# Patient Record
Sex: MALE | Race: WHITE | NOT HISPANIC OR LATINO
[De-identification: names, ages, dates, MRNs, and addresses within clinical notes are randomized per-mention and may not be internally consistent; named-entity substitution may affect disease eponyms.]

---

## 2015-05-26 RX ORDER — WARFARIN SODIUM 2.5 MG/1
1 TABLET ORAL
Qty: 0 | Refills: 0 | COMMUNITY
Start: 2015-05-26

## 2015-05-26 RX ORDER — METOPROLOL TARTRATE 50 MG
0 TABLET ORAL
Qty: 0 | Refills: 0 | COMMUNITY
Start: 2015-05-26

## 2017-01-03 ENCOUNTER — APPOINTMENT (OUTPATIENT)
Dept: CARDIOLOGY | Facility: CLINIC | Age: 82
End: 2017-01-03

## 2017-01-03 VITALS — HEART RATE: 67 BPM | HEIGHT: 75 IN | BODY MASS INDEX: 21.76 KG/M2 | WEIGHT: 175 LBS

## 2017-01-03 LAB — INR PPP: 3.3 RATIO

## 2017-01-10 ENCOUNTER — APPOINTMENT (OUTPATIENT)
Dept: CARDIOLOGY | Facility: CLINIC | Age: 82
End: 2017-01-10

## 2017-01-10 LAB — INR PPP: 3.1 RATIO

## 2017-01-19 ENCOUNTER — APPOINTMENT (OUTPATIENT)
Dept: CARDIOLOGY | Facility: CLINIC | Age: 82
End: 2017-01-19

## 2017-01-19 LAB — INR PPP: 2.1 RATIO

## 2017-02-01 ENCOUNTER — APPOINTMENT (OUTPATIENT)
Dept: CARDIOLOGY | Facility: CLINIC | Age: 82
End: 2017-02-01

## 2017-02-01 LAB — INR PPP: 1.5 RATIO

## 2017-02-08 ENCOUNTER — APPOINTMENT (OUTPATIENT)
Dept: CARDIOLOGY | Facility: CLINIC | Age: 82
End: 2017-02-08

## 2017-02-08 ENCOUNTER — NON-APPOINTMENT (OUTPATIENT)
Age: 82
End: 2017-02-08

## 2017-02-08 VITALS
WEIGHT: 176 LBS | BODY MASS INDEX: 21.88 KG/M2 | DIASTOLIC BLOOD PRESSURE: 81 MMHG | HEART RATE: 75 BPM | HEIGHT: 75 IN | SYSTOLIC BLOOD PRESSURE: 145 MMHG | OXYGEN SATURATION: 99 %

## 2017-02-08 LAB — INR PPP: 1.7 RATIO

## 2017-02-15 ENCOUNTER — RX RENEWAL (OUTPATIENT)
Age: 82
End: 2017-02-15

## 2017-02-15 ENCOUNTER — APPOINTMENT (OUTPATIENT)
Dept: CARDIOLOGY | Facility: CLINIC | Age: 82
End: 2017-02-15

## 2017-02-15 LAB — INR PPP: 2 RATIO

## 2017-03-01 ENCOUNTER — APPOINTMENT (OUTPATIENT)
Dept: CARDIOLOGY | Facility: CLINIC | Age: 82
End: 2017-03-01

## 2017-03-01 LAB — INR PPP: 2.4 RATIO

## 2017-03-22 ENCOUNTER — APPOINTMENT (OUTPATIENT)
Dept: CARDIOLOGY | Facility: CLINIC | Age: 82
End: 2017-03-22

## 2017-03-22 VITALS — BODY MASS INDEX: 21.88 KG/M2 | WEIGHT: 176 LBS | HEART RATE: 75 BPM | HEIGHT: 75 IN

## 2017-03-22 LAB
INR PPP: 2.8 RATIO
QUALITY CONTROL: YES

## 2017-04-03 ENCOUNTER — MEDICATION RENEWAL (OUTPATIENT)
Age: 82
End: 2017-04-03

## 2017-04-19 ENCOUNTER — APPOINTMENT (OUTPATIENT)
Dept: CARDIOLOGY | Facility: CLINIC | Age: 82
End: 2017-04-19

## 2017-04-20 ENCOUNTER — APPOINTMENT (OUTPATIENT)
Dept: CARDIOLOGY | Facility: CLINIC | Age: 82
End: 2017-04-20

## 2017-04-20 VITALS — HEIGHT: 75 IN | WEIGHT: 176 LBS | HEART RATE: 75 BPM | BODY MASS INDEX: 21.88 KG/M2

## 2017-04-20 LAB
INR PPP: 3.1 RATIO
QUALITY CONTROL: YES

## 2017-05-03 ENCOUNTER — RX RENEWAL (OUTPATIENT)
Age: 82
End: 2017-05-03

## 2017-05-18 ENCOUNTER — APPOINTMENT (OUTPATIENT)
Dept: CARDIOLOGY | Facility: CLINIC | Age: 82
End: 2017-05-18

## 2017-05-18 VITALS — BODY MASS INDEX: 21.88 KG/M2 | WEIGHT: 176 LBS | HEIGHT: 75 IN | HEART RATE: 75 BPM

## 2017-05-18 LAB
INR PPP: 4.4 RATIO
QUALITY CONTROL: YES

## 2017-05-24 ENCOUNTER — APPOINTMENT (OUTPATIENT)
Dept: CARDIOLOGY | Facility: CLINIC | Age: 82
End: 2017-05-24

## 2017-05-24 VITALS
WEIGHT: 182 LBS | HEIGHT: 75 IN | BODY MASS INDEX: 22.63 KG/M2 | OXYGEN SATURATION: 99 % | DIASTOLIC BLOOD PRESSURE: 62 MMHG | HEART RATE: 71 BPM | SYSTOLIC BLOOD PRESSURE: 108 MMHG

## 2017-05-25 VITALS — HEART RATE: 71 BPM | HEIGHT: 75 IN | BODY MASS INDEX: 22.63 KG/M2 | WEIGHT: 182 LBS

## 2017-05-25 LAB
INR PPP: 2.9 RATIO
QUALITY CONTROL: YES

## 2017-06-06 ENCOUNTER — APPOINTMENT (OUTPATIENT)
Dept: CARDIOLOGY | Facility: CLINIC | Age: 82
End: 2017-06-06

## 2017-06-06 VITALS — HEIGHT: 75 IN | HEART RATE: 71 BPM | BODY MASS INDEX: 22.63 KG/M2 | WEIGHT: 182 LBS

## 2017-06-06 LAB
INR PPP: 3.6 RATIO
QUALITY CONTROL: YES

## 2017-06-12 ENCOUNTER — RESULT CHARGE (OUTPATIENT)
Age: 82
End: 2017-06-12

## 2017-06-13 ENCOUNTER — APPOINTMENT (OUTPATIENT)
Dept: CARDIOLOGY | Facility: CLINIC | Age: 82
End: 2017-06-13

## 2017-06-13 ENCOUNTER — NON-APPOINTMENT (OUTPATIENT)
Age: 82
End: 2017-06-13

## 2017-06-13 VITALS
HEART RATE: 69 BPM | HEIGHT: 75 IN | DIASTOLIC BLOOD PRESSURE: 59 MMHG | WEIGHT: 168 LBS | OXYGEN SATURATION: 97 % | SYSTOLIC BLOOD PRESSURE: 96 MMHG | BODY MASS INDEX: 20.89 KG/M2

## 2017-06-15 LAB
ANION GAP SERPL CALC-SCNC: 16 MMOL/L
BUN SERPL-MCNC: 30 MG/DL
CALCIUM SERPL-MCNC: 10 MG/DL
CHLORIDE SERPL-SCNC: 103 MMOL/L
CO2 SERPL-SCNC: 26 MMOL/L
CREAT SERPL-MCNC: 1.64 MG/DL
DIGOXIN SERPL-MCNC: 1 NG/ML
GLUCOSE SERPL-MCNC: 91 MG/DL
POTASSIUM SERPL-SCNC: 4.5 MMOL/L
SODIUM SERPL-SCNC: 145 MMOL/L

## 2017-06-20 ENCOUNTER — APPOINTMENT (OUTPATIENT)
Dept: CARDIOLOGY | Facility: CLINIC | Age: 82
End: 2017-06-20

## 2017-06-20 VITALS — BODY MASS INDEX: 20.89 KG/M2 | WEIGHT: 168 LBS | HEART RATE: 69 BPM | HEIGHT: 75 IN

## 2017-06-20 LAB
INR PPP: 2.2 RATIO
QUALITY CONTROL: YES

## 2017-07-11 ENCOUNTER — APPOINTMENT (OUTPATIENT)
Dept: CARDIOLOGY | Facility: CLINIC | Age: 82
End: 2017-07-11

## 2017-07-13 LAB — INR PPP: 2.5 RATIO

## 2017-08-03 ENCOUNTER — TRANSCRIPTION ENCOUNTER (OUTPATIENT)
Age: 82
End: 2017-08-03

## 2017-08-08 ENCOUNTER — APPOINTMENT (OUTPATIENT)
Dept: CARDIOLOGY | Facility: CLINIC | Age: 82
End: 2017-08-08
Payer: MEDICARE

## 2017-08-08 VITALS — HEART RATE: 69 BPM | WEIGHT: 168 LBS | HEIGHT: 75 IN | BODY MASS INDEX: 20.89 KG/M2

## 2017-08-08 LAB
INR PPP: 2.5 RATIO
QUALITY CONTROL: YES

## 2017-08-08 PROCEDURE — 99211 OFF/OP EST MAY X REQ PHY/QHP: CPT

## 2017-08-08 PROCEDURE — 85610 PROTHROMBIN TIME: CPT | Mod: QW

## 2017-08-09 ENCOUNTER — APPOINTMENT (OUTPATIENT)
Dept: CARDIOLOGY | Facility: CLINIC | Age: 82
End: 2017-08-09
Payer: MEDICARE

## 2017-08-09 PROCEDURE — 93283 PRGRMG EVAL IMPLANTABLE DFB: CPT

## 2017-08-15 ENCOUNTER — APPOINTMENT (OUTPATIENT)
Dept: CARDIOLOGY | Facility: CLINIC | Age: 82
End: 2017-08-15

## 2017-09-08 ENCOUNTER — APPOINTMENT (OUTPATIENT)
Dept: CARDIOLOGY | Facility: CLINIC | Age: 82
End: 2017-09-08
Payer: MEDICARE

## 2017-09-08 VITALS — BODY MASS INDEX: 20.89 KG/M2 | HEIGHT: 75 IN | HEART RATE: 69 BPM | WEIGHT: 168 LBS

## 2017-09-08 LAB
INR PPP: 1.5 RATIO
QUALITY CONTROL: YES

## 2017-09-08 PROCEDURE — 85610 PROTHROMBIN TIME: CPT | Mod: QW

## 2017-09-08 PROCEDURE — 99211 OFF/OP EST MAY X REQ PHY/QHP: CPT

## 2017-09-15 ENCOUNTER — APPOINTMENT (OUTPATIENT)
Dept: CARDIOLOGY | Facility: CLINIC | Age: 82
End: 2017-09-15
Payer: MEDICARE

## 2017-09-15 LAB
INR PPP: 2 RATIO
QUALITY CONTROL: YES

## 2017-09-15 PROCEDURE — 99211 OFF/OP EST MAY X REQ PHY/QHP: CPT

## 2017-09-15 PROCEDURE — 85610 PROTHROMBIN TIME: CPT | Mod: QW

## 2017-09-29 ENCOUNTER — APPOINTMENT (OUTPATIENT)
Dept: CARDIOLOGY | Facility: CLINIC | Age: 82
End: 2017-09-29
Payer: MEDICARE

## 2017-09-29 VITALS — HEIGHT: 75 IN | HEART RATE: 69 BPM | WEIGHT: 168 LBS | BODY MASS INDEX: 20.89 KG/M2

## 2017-09-29 PROCEDURE — 99211 OFF/OP EST MAY X REQ PHY/QHP: CPT

## 2017-09-29 PROCEDURE — 85610 PROTHROMBIN TIME: CPT | Mod: QW

## 2017-10-02 LAB
INR PPP: 2.5 RATIO
QUALITY CONTROL: YES

## 2017-10-20 ENCOUNTER — APPOINTMENT (OUTPATIENT)
Dept: CARDIOLOGY | Facility: CLINIC | Age: 82
End: 2017-10-20
Payer: MEDICARE

## 2017-10-20 LAB
INR PPP: 2.3 RATIO
QUALITY CONTROL: YES

## 2017-10-20 PROCEDURE — 85610 PROTHROMBIN TIME: CPT | Mod: QW

## 2017-10-20 PROCEDURE — 99211 OFF/OP EST MAY X REQ PHY/QHP: CPT

## 2017-11-09 ENCOUNTER — MEDICATION RENEWAL (OUTPATIENT)
Age: 82
End: 2017-11-09

## 2017-11-17 ENCOUNTER — APPOINTMENT (OUTPATIENT)
Dept: CARDIOLOGY | Facility: CLINIC | Age: 82
End: 2017-11-17
Payer: MEDICARE

## 2017-11-17 VITALS — HEIGHT: 75 IN | WEIGHT: 168 LBS | HEART RATE: 69 BPM | BODY MASS INDEX: 20.89 KG/M2

## 2017-11-17 LAB
INR PPP: 2.8 RATIO
QUALITY CONTROL: YES

## 2017-11-17 PROCEDURE — 99211 OFF/OP EST MAY X REQ PHY/QHP: CPT

## 2017-11-17 PROCEDURE — 85610 PROTHROMBIN TIME: CPT | Mod: QW

## 2017-12-15 ENCOUNTER — APPOINTMENT (OUTPATIENT)
Dept: CARDIOLOGY | Facility: CLINIC | Age: 82
End: 2017-12-15
Payer: MEDICARE

## 2017-12-15 VITALS — BODY MASS INDEX: 20.89 KG/M2 | HEART RATE: 70 BPM | HEIGHT: 75 IN | WEIGHT: 168 LBS

## 2017-12-15 LAB
INR PPP: 3.6 RATIO
QUALITY CONTROL: YES

## 2017-12-15 PROCEDURE — 85610 PROTHROMBIN TIME: CPT | Mod: QW

## 2017-12-15 PROCEDURE — 99211 OFF/OP EST MAY X REQ PHY/QHP: CPT

## 2017-12-22 ENCOUNTER — APPOINTMENT (OUTPATIENT)
Dept: CARDIOLOGY | Facility: CLINIC | Age: 82
End: 2017-12-22
Payer: MEDICARE

## 2017-12-22 VITALS — BODY MASS INDEX: 20.89 KG/M2 | HEIGHT: 75 IN | WEIGHT: 168 LBS | HEART RATE: 70 BPM

## 2017-12-22 LAB
INR PPP: 3.6 RATIO
QUALITY CONTROL: YES

## 2017-12-22 PROCEDURE — 99211 OFF/OP EST MAY X REQ PHY/QHP: CPT

## 2017-12-22 PROCEDURE — 85610 PROTHROMBIN TIME: CPT | Mod: QW

## 2017-12-29 ENCOUNTER — APPOINTMENT (OUTPATIENT)
Dept: CARDIOLOGY | Facility: CLINIC | Age: 82
End: 2017-12-29
Payer: MEDICARE

## 2017-12-29 VITALS — BODY MASS INDEX: 20.89 KG/M2 | HEIGHT: 75 IN | WEIGHT: 168 LBS | HEART RATE: 70 BPM

## 2017-12-29 PROCEDURE — 99211 OFF/OP EST MAY X REQ PHY/QHP: CPT

## 2017-12-29 PROCEDURE — 85610 PROTHROMBIN TIME: CPT | Mod: QW

## 2018-01-02 LAB
INR PPP: 3.8 RATIO
QUALITY CONTROL: YES

## 2018-01-05 ENCOUNTER — APPOINTMENT (OUTPATIENT)
Dept: CARDIOLOGY | Facility: CLINIC | Age: 83
End: 2018-01-05
Payer: MEDICARE

## 2018-01-05 VITALS — BODY MASS INDEX: 20.89 KG/M2 | HEART RATE: 70 BPM | HEIGHT: 75 IN | WEIGHT: 168 LBS

## 2018-01-05 LAB
INR PPP: 2.3 RATIO
QUALITY CONTROL: YES

## 2018-01-05 PROCEDURE — 85610 PROTHROMBIN TIME: CPT | Mod: QW

## 2018-01-05 PROCEDURE — 99211 OFF/OP EST MAY X REQ PHY/QHP: CPT

## 2018-01-19 ENCOUNTER — APPOINTMENT (OUTPATIENT)
Dept: CARDIOLOGY | Facility: CLINIC | Age: 83
End: 2018-01-19
Payer: MEDICARE

## 2018-01-19 PROCEDURE — 99211 OFF/OP EST MAY X REQ PHY/QHP: CPT

## 2018-01-19 PROCEDURE — 85610 PROTHROMBIN TIME: CPT | Mod: QW

## 2018-01-23 VITALS — BODY MASS INDEX: 20.89 KG/M2 | WEIGHT: 168 LBS | HEART RATE: 70 BPM | HEIGHT: 75 IN

## 2018-01-23 LAB
INR PPP: 1.9 RATIO
QUALITY CONTROL: YES

## 2018-02-09 ENCOUNTER — APPOINTMENT (OUTPATIENT)
Dept: CARDIOLOGY | Facility: CLINIC | Age: 83
End: 2018-02-09
Payer: MEDICARE

## 2018-02-09 VITALS — BODY MASS INDEX: 20.89 KG/M2 | HEIGHT: 75 IN | WEIGHT: 168 LBS | HEART RATE: 70 BPM

## 2018-02-09 LAB
INR PPP: 1.4 RATIO
QUALITY CONTROL: YES

## 2018-02-09 PROCEDURE — 99211 OFF/OP EST MAY X REQ PHY/QHP: CPT

## 2018-02-09 PROCEDURE — 85610 PROTHROMBIN TIME: CPT | Mod: QW

## 2018-02-16 ENCOUNTER — APPOINTMENT (OUTPATIENT)
Dept: CARDIOLOGY | Facility: CLINIC | Age: 83
End: 2018-02-16
Payer: MEDICARE

## 2018-02-16 VITALS — HEART RATE: 70 BPM | WEIGHT: 168 LBS | HEIGHT: 75 IN | BODY MASS INDEX: 20.89 KG/M2

## 2018-02-16 LAB
INR PPP: 1.8 RATIO
QUALITY CONTROL: YES

## 2018-02-16 PROCEDURE — 85610 PROTHROMBIN TIME: CPT | Mod: QW

## 2018-02-16 PROCEDURE — 99211 OFF/OP EST MAY X REQ PHY/QHP: CPT

## 2018-03-01 ENCOUNTER — APPOINTMENT (OUTPATIENT)
Dept: CARDIOLOGY | Facility: CLINIC | Age: 83
End: 2018-03-01
Payer: MEDICARE

## 2018-03-01 VITALS — BODY MASS INDEX: 20.89 KG/M2 | WEIGHT: 168 LBS | HEART RATE: 70 BPM | HEIGHT: 75 IN

## 2018-03-01 LAB
INR PPP: 1.6 RATIO
QUALITY CONTROL: YES

## 2018-03-01 PROCEDURE — 99211 OFF/OP EST MAY X REQ PHY/QHP: CPT

## 2018-03-01 PROCEDURE — 85610 PROTHROMBIN TIME: CPT | Mod: QW

## 2018-03-15 ENCOUNTER — APPOINTMENT (OUTPATIENT)
Dept: CARDIOLOGY | Facility: CLINIC | Age: 83
End: 2018-03-15
Payer: MEDICARE

## 2018-03-15 ENCOUNTER — MEDICATION RENEWAL (OUTPATIENT)
Age: 83
End: 2018-03-15

## 2018-03-15 VITALS — BODY MASS INDEX: 20.89 KG/M2 | HEIGHT: 75 IN | WEIGHT: 168 LBS

## 2018-03-15 LAB
INR PPP: 2.5 RATIO
QUALITY CONTROL: YES

## 2018-03-15 PROCEDURE — 85610 PROTHROMBIN TIME: CPT | Mod: QW

## 2018-03-15 PROCEDURE — 99211 OFF/OP EST MAY X REQ PHY/QHP: CPT

## 2018-03-20 ENCOUNTER — RX RENEWAL (OUTPATIENT)
Age: 83
End: 2018-03-20

## 2018-04-09 ENCOUNTER — TRANSCRIPTION ENCOUNTER (OUTPATIENT)
Age: 83
End: 2018-04-09

## 2018-04-12 ENCOUNTER — APPOINTMENT (OUTPATIENT)
Dept: CARDIOLOGY | Facility: CLINIC | Age: 83
End: 2018-04-12
Payer: MEDICARE

## 2018-04-12 PROCEDURE — 85610 PROTHROMBIN TIME: CPT | Mod: QW

## 2018-04-12 PROCEDURE — 99211 OFF/OP EST MAY X REQ PHY/QHP: CPT

## 2018-04-13 VITALS — HEIGHT: 75 IN | BODY MASS INDEX: 20.89 KG/M2 | WEIGHT: 168 LBS | HEART RATE: 70 BPM

## 2018-04-15 LAB
INR PPP: 2.8 RATIO
QUALITY CONTROL: YES

## 2018-04-23 ENCOUNTER — RX RENEWAL (OUTPATIENT)
Age: 83
End: 2018-04-23

## 2018-05-10 ENCOUNTER — APPOINTMENT (OUTPATIENT)
Dept: CARDIOLOGY | Facility: CLINIC | Age: 83
End: 2018-05-10
Payer: MEDICARE

## 2018-05-10 ENCOUNTER — NON-APPOINTMENT (OUTPATIENT)
Age: 83
End: 2018-05-10

## 2018-05-10 VITALS
HEART RATE: 67 BPM | DIASTOLIC BLOOD PRESSURE: 56 MMHG | WEIGHT: 167 LBS | HEIGHT: 75 IN | SYSTOLIC BLOOD PRESSURE: 84 MMHG | BODY MASS INDEX: 20.76 KG/M2

## 2018-05-10 VITALS — WEIGHT: 168 LBS | BODY MASS INDEX: 20.89 KG/M2 | HEIGHT: 75 IN | RESPIRATION RATE: 14 BRPM

## 2018-05-10 LAB
INR PPP: 3 RATIO
QUALITY CONTROL: YES

## 2018-05-10 PROCEDURE — 93000 ELECTROCARDIOGRAM COMPLETE: CPT

## 2018-05-10 PROCEDURE — 99215 OFFICE O/P EST HI 40 MIN: CPT

## 2018-05-10 PROCEDURE — 85610 PROTHROMBIN TIME: CPT | Mod: QW

## 2018-06-01 ENCOUNTER — APPOINTMENT (OUTPATIENT)
Dept: CARDIOLOGY | Facility: CLINIC | Age: 83
End: 2018-06-01
Payer: MEDICARE

## 2018-06-01 ENCOUNTER — NON-APPOINTMENT (OUTPATIENT)
Age: 83
End: 2018-06-01

## 2018-06-01 VITALS
WEIGHT: 159 LBS | BODY MASS INDEX: 19.77 KG/M2 | DIASTOLIC BLOOD PRESSURE: 56 MMHG | SYSTOLIC BLOOD PRESSURE: 95 MMHG | OXYGEN SATURATION: 100 % | HEART RATE: 70 BPM | HEIGHT: 75 IN

## 2018-06-01 LAB
INR PPP: 2.8 RATIO
QUALITY CONTROL: YES

## 2018-06-01 PROCEDURE — 99215 OFFICE O/P EST HI 40 MIN: CPT

## 2018-06-01 PROCEDURE — 85610 PROTHROMBIN TIME: CPT | Mod: QW

## 2018-06-01 PROCEDURE — 93000 ELECTROCARDIOGRAM COMPLETE: CPT

## 2018-06-18 ENCOUNTER — RX RENEWAL (OUTPATIENT)
Age: 83
End: 2018-06-18

## 2018-06-26 ENCOUNTER — APPOINTMENT (OUTPATIENT)
Dept: CARDIOLOGY | Facility: CLINIC | Age: 83
End: 2018-06-26
Payer: MEDICARE

## 2018-06-26 PROCEDURE — 93306 TTE W/DOPPLER COMPLETE: CPT

## 2018-06-28 ENCOUNTER — APPOINTMENT (OUTPATIENT)
Dept: CARDIOLOGY | Facility: CLINIC | Age: 83
End: 2018-06-28
Payer: MEDICARE

## 2018-06-28 VITALS — WEIGHT: 159 LBS | HEART RATE: 70 BPM | BODY MASS INDEX: 19.77 KG/M2 | HEIGHT: 75 IN

## 2018-06-28 PROCEDURE — 93793 ANTICOAG MGMT PT WARFARIN: CPT

## 2018-06-28 PROCEDURE — 85610 PROTHROMBIN TIME: CPT | Mod: QW

## 2018-06-30 LAB
INR PPP: 3.5 RATIO
QUALITY CONTROL: YES

## 2018-07-06 ENCOUNTER — APPOINTMENT (OUTPATIENT)
Dept: CARDIOLOGY | Facility: CLINIC | Age: 83
End: 2018-07-06

## 2018-07-11 ENCOUNTER — APPOINTMENT (OUTPATIENT)
Dept: CARDIOLOGY | Facility: CLINIC | Age: 83
End: 2018-07-11
Payer: MEDICARE

## 2018-07-11 VITALS — HEART RATE: 70 BPM | WEIGHT: 159 LBS | HEIGHT: 75 IN | BODY MASS INDEX: 19.77 KG/M2

## 2018-07-11 LAB
INR PPP: 1.6 RATIO
QUALITY CONTROL: YES

## 2018-07-11 PROCEDURE — 93793 ANTICOAG MGMT PT WARFARIN: CPT

## 2018-07-11 PROCEDURE — 85610 PROTHROMBIN TIME: CPT | Mod: QW

## 2018-07-25 ENCOUNTER — APPOINTMENT (OUTPATIENT)
Dept: CARDIOLOGY | Facility: CLINIC | Age: 83
End: 2018-07-25
Payer: MEDICARE

## 2018-07-25 PROCEDURE — 85610 PROTHROMBIN TIME: CPT | Mod: QW

## 2018-07-25 PROCEDURE — 93793 ANTICOAG MGMT PT WARFARIN: CPT

## 2018-07-27 VITALS — WEIGHT: 159 LBS | HEART RATE: 70 BPM | HEIGHT: 75 IN | BODY MASS INDEX: 19.77 KG/M2

## 2018-07-30 LAB
INR PPP: 1.6 RATIO
QUALITY CONTROL: YES

## 2018-08-02 ENCOUNTER — RX RENEWAL (OUTPATIENT)
Age: 83
End: 2018-08-02

## 2018-08-08 ENCOUNTER — APPOINTMENT (OUTPATIENT)
Dept: CARDIOLOGY | Facility: CLINIC | Age: 83
End: 2018-08-08
Payer: MEDICARE

## 2018-08-08 VITALS — BODY MASS INDEX: 19.77 KG/M2 | HEART RATE: 70 BPM | WEIGHT: 159 LBS | HEIGHT: 75 IN

## 2018-08-08 LAB
INR PPP: 1.9 RATIO
QUALITY CONTROL: YES

## 2018-08-08 PROCEDURE — 85610 PROTHROMBIN TIME: CPT | Mod: QW

## 2018-08-08 PROCEDURE — 93793 ANTICOAG MGMT PT WARFARIN: CPT

## 2018-08-22 ENCOUNTER — APPOINTMENT (OUTPATIENT)
Dept: CARDIOLOGY | Facility: CLINIC | Age: 83
End: 2018-08-22
Payer: MEDICARE

## 2018-08-22 PROCEDURE — 93283 PRGRMG EVAL IMPLANTABLE DFB: CPT

## 2018-08-29 ENCOUNTER — APPOINTMENT (OUTPATIENT)
Dept: CARDIOLOGY | Facility: CLINIC | Age: 83
End: 2018-08-29
Payer: MEDICARE

## 2018-08-29 ENCOUNTER — NON-APPOINTMENT (OUTPATIENT)
Age: 83
End: 2018-08-29

## 2018-08-29 VITALS
SYSTOLIC BLOOD PRESSURE: 95 MMHG | HEART RATE: 70 BPM | DIASTOLIC BLOOD PRESSURE: 61 MMHG | OXYGEN SATURATION: 98 % | HEIGHT: 75 IN | BODY MASS INDEX: 18.65 KG/M2 | WEIGHT: 150 LBS

## 2018-08-29 PROCEDURE — 93000 ELECTROCARDIOGRAM COMPLETE: CPT

## 2018-08-29 PROCEDURE — 85610 PROTHROMBIN TIME: CPT | Mod: QW

## 2018-08-29 PROCEDURE — 99215 OFFICE O/P EST HI 40 MIN: CPT

## 2018-08-29 RX ORDER — SPIRONOLACTONE 25 MG/1
25 TABLET ORAL
Qty: 45 | Refills: 3 | Status: ACTIVE | COMMUNITY
Start: 2017-05-24

## 2018-08-29 RX ORDER — METOPROLOL SUCCINATE 25 MG/1
25 TABLET, EXTENDED RELEASE ORAL DAILY
Qty: 90 | Refills: 3 | Status: ACTIVE | COMMUNITY
Start: 2018-05-10 | End: 1900-01-01

## 2018-08-30 VITALS — HEART RATE: 70 BPM | HEIGHT: 75 IN | BODY MASS INDEX: 18.65 KG/M2 | WEIGHT: 150 LBS

## 2018-08-30 LAB
INR PPP: 1.8 RATIO
QUALITY CONTROL: YES

## 2018-09-10 ENCOUNTER — INPATIENT (INPATIENT)
Facility: HOSPITAL | Age: 83
LOS: 9 days | Discharge: NURSING, REHAB OR LONG-TERM CARE | DRG: 391 | End: 2018-09-20
Attending: INTERNAL MEDICINE | Admitting: FAMILY MEDICINE
Payer: MEDICARE

## 2018-09-10 VITALS
HEIGHT: 75 IN | SYSTOLIC BLOOD PRESSURE: 105 MMHG | DIASTOLIC BLOOD PRESSURE: 70 MMHG | RESPIRATION RATE: 16 BRPM | WEIGHT: 149.91 LBS | HEART RATE: 60 BPM | TEMPERATURE: 98 F | OXYGEN SATURATION: 98 %

## 2018-09-10 DIAGNOSIS — R74.8 ABNORMAL LEVELS OF OTHER SERUM ENZYMES: ICD-10-CM

## 2018-09-10 DIAGNOSIS — I50.9 HEART FAILURE, UNSPECIFIED: ICD-10-CM

## 2018-09-10 DIAGNOSIS — N17.9 ACUTE KIDNEY FAILURE, UNSPECIFIED: ICD-10-CM

## 2018-09-10 DIAGNOSIS — R62.7 ADULT FAILURE TO THRIVE: ICD-10-CM

## 2018-09-10 DIAGNOSIS — R06.09 OTHER FORMS OF DYSPNEA: ICD-10-CM

## 2018-09-10 DIAGNOSIS — I48.0 PAROXYSMAL ATRIAL FIBRILLATION: ICD-10-CM

## 2018-09-10 DIAGNOSIS — Z29.9 ENCOUNTER FOR PROPHYLACTIC MEASURES, UNSPECIFIED: ICD-10-CM

## 2018-09-10 DIAGNOSIS — I25.10 ATHEROSCLEROTIC HEART DISEASE OF NATIVE CORONARY ARTERY WITHOUT ANGINA PECTORIS: ICD-10-CM

## 2018-09-10 DIAGNOSIS — I10 ESSENTIAL (PRIMARY) HYPERTENSION: ICD-10-CM

## 2018-09-10 DIAGNOSIS — H40.9 UNSPECIFIED GLAUCOMA: ICD-10-CM

## 2018-09-10 LAB
ALBUMIN SERPL ELPH-MCNC: 3 G/DL — LOW (ref 3.3–5)
ALP SERPL-CCNC: 100 U/L — SIGNIFICANT CHANGE UP (ref 40–120)
ALT FLD-CCNC: 16 U/L — SIGNIFICANT CHANGE UP (ref 12–78)
ANION GAP SERPL CALC-SCNC: 9 MMOL/L — SIGNIFICANT CHANGE UP (ref 5–17)
APPEARANCE UR: CLEAR — SIGNIFICANT CHANGE UP
APTT BLD: 47 SEC — HIGH (ref 27.5–37.4)
AST SERPL-CCNC: 33 U/L — SIGNIFICANT CHANGE UP (ref 15–37)
BACTERIA # UR AUTO: NEGATIVE — SIGNIFICANT CHANGE UP
BASOPHILS # BLD AUTO: 0.04 K/UL — SIGNIFICANT CHANGE UP (ref 0–0.2)
BASOPHILS NFR BLD AUTO: 0.4 % — SIGNIFICANT CHANGE UP (ref 0–2)
BILIRUB SERPL-MCNC: 1.2 MG/DL — SIGNIFICANT CHANGE UP (ref 0.2–1.2)
BILIRUB UR-MCNC: NEGATIVE — SIGNIFICANT CHANGE UP
BUN SERPL-MCNC: 52 MG/DL — HIGH (ref 7–23)
CALCIUM SERPL-MCNC: 9 MG/DL — SIGNIFICANT CHANGE UP (ref 8.5–10.1)
CHLORIDE SERPL-SCNC: 105 MMOL/L — SIGNIFICANT CHANGE UP (ref 96–108)
CK MB BLD-MCNC: 7.3 % — HIGH (ref 0–3.5)
CK MB CFR SERPL CALC: 3.4 NG/ML — SIGNIFICANT CHANGE UP (ref 0–3.6)
CK MB CFR SERPL CALC: 3.6 NG/ML — SIGNIFICANT CHANGE UP (ref 0–3.6)
CK SERPL-CCNC: 49 U/L — SIGNIFICANT CHANGE UP (ref 26–308)
CO2 SERPL-SCNC: 26 MMOL/L — SIGNIFICANT CHANGE UP (ref 22–31)
COLOR SPEC: YELLOW — SIGNIFICANT CHANGE UP
CREAT SERPL-MCNC: 1.8 MG/DL — HIGH (ref 0.5–1.3)
DIFF PNL FLD: NEGATIVE — SIGNIFICANT CHANGE UP
EOSINOPHIL # BLD AUTO: 0.03 K/UL — SIGNIFICANT CHANGE UP (ref 0–0.5)
EOSINOPHIL NFR BLD AUTO: 0.3 % — SIGNIFICANT CHANGE UP (ref 0–6)
EPI CELLS # UR: SIGNIFICANT CHANGE UP
GLUCOSE SERPL-MCNC: 82 MG/DL — SIGNIFICANT CHANGE UP (ref 70–99)
GLUCOSE UR QL: NEGATIVE — SIGNIFICANT CHANGE UP
HCT VFR BLD CALC: 35.5 % — LOW (ref 39–50)
HGB BLD-MCNC: 12 G/DL — LOW (ref 13–17)
IMM GRANULOCYTES NFR BLD AUTO: 0.4 % — SIGNIFICANT CHANGE UP (ref 0–1.5)
INR BLD: 3.85 RATIO — HIGH (ref 0.88–1.16)
KETONES UR-MCNC: NEGATIVE — SIGNIFICANT CHANGE UP
LEUKOCYTE ESTERASE UR-ACNC: ABNORMAL
LYMPHOCYTES # BLD AUTO: 0.68 K/UL — LOW (ref 1–3.3)
LYMPHOCYTES # BLD AUTO: 6.9 % — LOW (ref 13–44)
MCHC RBC-ENTMCNC: 33 PG — SIGNIFICANT CHANGE UP (ref 27–34)
MCHC RBC-ENTMCNC: 33.8 GM/DL — SIGNIFICANT CHANGE UP (ref 32–36)
MCV RBC AUTO: 97.5 FL — SIGNIFICANT CHANGE UP (ref 80–100)
MONOCYTES # BLD AUTO: 1 K/UL — HIGH (ref 0–0.9)
MONOCYTES NFR BLD AUTO: 10.1 % — SIGNIFICANT CHANGE UP (ref 2–14)
NEUTROPHILS # BLD AUTO: 8.12 K/UL — HIGH (ref 1.8–7.4)
NEUTROPHILS NFR BLD AUTO: 81.9 % — HIGH (ref 43–77)
NITRITE UR-MCNC: NEGATIVE — SIGNIFICANT CHANGE UP
NT-PROBNP SERPL-SCNC: HIGH PG/ML (ref 0–450)
PH UR: 5 — SIGNIFICANT CHANGE UP (ref 5–8)
PLATELET # BLD AUTO: 249 K/UL — SIGNIFICANT CHANGE UP (ref 150–400)
POTASSIUM SERPL-MCNC: 4.6 MMOL/L — SIGNIFICANT CHANGE UP (ref 3.5–5.3)
POTASSIUM SERPL-SCNC: 4.6 MMOL/L — SIGNIFICANT CHANGE UP (ref 3.5–5.3)
PROT SERPL-MCNC: 8.2 G/DL — SIGNIFICANT CHANGE UP (ref 6–8.3)
PROT UR-MCNC: NEGATIVE — SIGNIFICANT CHANGE UP
PROTHROM AB SERPL-ACNC: 43.1 SEC — HIGH (ref 9.8–12.7)
RBC # BLD: 3.64 M/UL — LOW (ref 4.2–5.8)
RBC # FLD: 14.9 % — HIGH (ref 10.3–14.5)
RBC CASTS # UR COMP ASSIST: NEGATIVE /HPF — SIGNIFICANT CHANGE UP (ref 0–4)
SODIUM SERPL-SCNC: 140 MMOL/L — SIGNIFICANT CHANGE UP (ref 135–145)
SP GR SPEC: 1.01 — SIGNIFICANT CHANGE UP (ref 1.01–1.02)
TROPONIN I SERPL-MCNC: 0.24 NG/ML — HIGH (ref 0.01–0.04)
TROPONIN I SERPL-MCNC: 0.24 NG/ML — HIGH (ref 0.01–0.04)
TSH SERPL-MCNC: 2.87 UIU/ML — SIGNIFICANT CHANGE UP (ref 0.36–3.74)
UROBILINOGEN FLD QL: NEGATIVE — SIGNIFICANT CHANGE UP
WBC # BLD: 9.91 K/UL — SIGNIFICANT CHANGE UP (ref 3.8–10.5)
WBC # FLD AUTO: 9.91 K/UL — SIGNIFICANT CHANGE UP (ref 3.8–10.5)
WBC UR QL: SIGNIFICANT CHANGE UP

## 2018-09-10 PROCEDURE — 93010 ELECTROCARDIOGRAM REPORT: CPT

## 2018-09-10 PROCEDURE — 99222 1ST HOSP IP/OBS MODERATE 55: CPT

## 2018-09-10 PROCEDURE — 99223 1ST HOSP IP/OBS HIGH 75: CPT | Mod: GC,AI

## 2018-09-10 PROCEDURE — 99285 EMERGENCY DEPT VISIT HI MDM: CPT

## 2018-09-10 PROCEDURE — 71045 X-RAY EXAM CHEST 1 VIEW: CPT | Mod: 26

## 2018-09-10 RX ORDER — DIGOXIN 250 MCG
0.25 TABLET ORAL DAILY
Qty: 0 | Refills: 0 | Status: DISCONTINUED | OUTPATIENT
Start: 2018-09-10 | End: 2018-09-14

## 2018-09-10 RX ORDER — METOPROLOL TARTRATE 50 MG
5 TABLET ORAL EVERY 6 HOURS
Qty: 0 | Refills: 0 | Status: DISCONTINUED | OUTPATIENT
Start: 2018-09-10 | End: 2018-09-14

## 2018-09-10 RX ORDER — FUROSEMIDE 40 MG
40 TABLET ORAL DAILY
Qty: 0 | Refills: 0 | Status: DISCONTINUED | OUTPATIENT
Start: 2018-09-10 | End: 2018-09-11

## 2018-09-10 RX ORDER — SODIUM CHLORIDE 9 MG/ML
1000 INJECTION, SOLUTION INTRAVENOUS
Qty: 0 | Refills: 0 | Status: DISCONTINUED | OUTPATIENT
Start: 2018-09-10 | End: 2018-09-11

## 2018-09-10 RX ORDER — INFLUENZA VIRUS VACCINE 15; 15; 15; 15 UG/.5ML; UG/.5ML; UG/.5ML; UG/.5ML
0.5 SUSPENSION INTRAMUSCULAR ONCE
Qty: 0 | Refills: 0 | Status: COMPLETED | OUTPATIENT
Start: 2018-09-10 | End: 2018-09-20

## 2018-09-10 RX ORDER — TIMOLOL 0.5 %
1 DROPS OPHTHALMIC (EYE)
Qty: 0 | Refills: 0 | Status: DISCONTINUED | OUTPATIENT
Start: 2018-09-10 | End: 2018-09-20

## 2018-09-10 RX ADMIN — SODIUM CHLORIDE 75 MILLILITER(S): 9 INJECTION, SOLUTION INTRAVENOUS at 20:20

## 2018-09-10 NOTE — H&P ADULT - PROBLEM SELECTOR PLAN 10
Patient on home coumadin but is supratherapeutic at this time.   IMPROVE VTE Individual Risk Assessment        RISK                                                          Points  [  ] Previous VTE                                                3  [  ] Thrombophilia                                             2  [  ] Lower limb paralysis                                   2        (unable to hold up >15 seconds)    [x  ] Current Cancer                                             2         (within 6 months)  [  ] Immobilization > 24 hrs                              1  [  ] ICU/CCU stay > 24 hours                             1  [ x ] Age > 60                                                         1    IMPROVE VTE Score: 3

## 2018-09-10 NOTE — H&P ADULT - PROBLEM SELECTOR PLAN 6
CAD, chronic  - continue plavix and statin once patient able to tolerate PO pending speech pathologist

## 2018-09-10 NOTE — H&P ADULT - NSHPPHYSICALEXAM_GEN_ALL_CORE
T(C): 36.7 (09-10-18 @ 14:18), Max: 36.7 (09-10-18 @ 14:18)  HR: 60 (09-10-18 @ 14:18) (60 - 60)  BP: 105/70 (09-10-18 @ 14:18) (105/70 - 105/70)  RR: 16 (09-10-18 @ 14:18) (16 - 16)  SpO2: 98% (09-10-18 @ 14:18) (98% - 98%)    GENERAL: Elderly  male patient appears well, no acute distress, appropriate, pleasant  HEAD: black wound noted on right side of head from squamous cell cancer s/p surgical therapy. Son reports that patient has followed up with dermatologist and wound is healing well.   EYES: sclera clear, no exudates  ENMT: oropharynx clear without erythema, no exudates, moist mucous membranes, malodorous breath  NECK: supple, soft, no thyromegaly noted  LUNGS: good air entry bilaterally, clear to auscultation, symmetric breath sounds, no wheezing or rhonchi appreciated  HEART: soft S1/S2, regular rate and rhythm, no murmurs noted, no lower extremity edema, PPM noted   GASTROINTESTINAL: abdomen is soft, nontender, nondistended, normoactive bowel sounds, no palpable masses  INTEGUMENT: black wound noted on right side of head from squamous cell cancer s/p surgical therapy, small non-infectious appearing wound on 2nd digit of right foot. Multiple age spots noted on patient's back.   MUSCULOSKELETAL: no clubbing or cyanosis, no obvious deformity  NEUROLOGIC: awake, alert, oriented x3, good muscle tone in 4 extremities, no obvious sensory deficits  PSYCHIATRIC: mood is good, affect is congruent, linear and logical thought process  HEME/LYMPH: no palpable supraclavicular nodules, no obvious ecchymosis or petechiae

## 2018-09-10 NOTE — H&P ADULT - NSHPSOCIALHISTORY_GEN_ALL_CORE
Social History:    Marital Status:  (  x )    (   ) Single    (   )    (  )   Lives with: (  ) alone  (  ) children   ( x ) spouse   (  ) parents  (  ) other    Substance Use (street drugs): ( x ) never used  (  ) other:  Tobacco Usage:  (  x ) never smoked   (   ) former smoker   (   ) current smoker  (     ) pack year  (        ) last cigarette date  Alcohol Usage: admits to social EtOH    Health Management     Immunization Hx:   ( x ) flu shot                               ( 2017    ) date   (x  ) pneumonia shot               (   many years ago  ) date

## 2018-09-10 NOTE — H&P ADULT - NSHPREVIEWOFSYSTEMS_GEN_ALL_CORE
CONSTITUTIONAL: admits to chills, weakness, denies fever   HEENT: denies blurred visions, sore throat  SKIN: black wond/opening on right side of head from squamous cell cancer s/p surgical therapy   CARDIOVASCULAR: denies chest pain, chest pressure, palpitations  RESPIRATORY: admits to dyspnea on exertion   GASTROINTESTINAL: admits to intermittent substernal abdominal pain, denies nausea, vomiting, diarrhea, denies pain with swallowing.   GENITOURINARY: denies dysuria, discharge  NEUROLOGICAL: denies numbness, headache, focal weakness  MUSCULOSKELETAL: denies new joint pain, muscle aches  HEMATOLOGIC: denies gross bleeding, bruising  LYMPHATICS: denies enlarged lymph nodes, extremity swelling  PSYCHIATRIC: denies recent changes in anxiety, depression  ENDOCRINOLOGIC: denies sweating, cold or heat intolerance

## 2018-09-10 NOTE — H&P ADULT - PROBLEM SELECTOR PLAN 2
Elevated troponin likely related to TIBURCIO  - 1st set of trops 0.245  - f/u 2nd set CE's at 10:30PM, 5AM tomorrow   - Cardio: Dr. Linda following   - monitor on tele

## 2018-09-10 NOTE — CONSULT NOTE ADULT - ASSESSMENT
84 M of PAF AC, CAD s/p remote PCI Luu's esophagus, a repaired Zenker's diverticulum, hypertension, reflux, hypercholesterolemia, glaucoma, moderate NICM, HCM s/p biV ICD p/w dyspnea  - He is mildly vol ol given his CXR.   - I would give lasix 40mg IV Qday and see if BP can tolerate  - May hold his aldactone  - Please continue to maintain strict I/Os, monitor daily weights, Cr, and K.  - Ned are elevated but likely from demand ischemia.  Would trend  - Cont plavix and statin  - EKG is   - Cont Dig. Please check level  - He could be aspirating. I would have them do a speech and swallow.   - Further cardiac workup will depend on clinical course.   - All other workup per primary team. Will followup. 84 M of PAF AC, CAD s/p remote PCI Luu's esophagus, a repaired Zenker's diverticulum, hypertension, reflux, hypercholesterolemia, glaucoma, moderate NICM, HCM s/p biV ICD p/w dyspnea  - He is mildly vol ol given his CXR.   - I would give lasix 40mg IV Qday and see if BP can tolerate  - May hold his aldactone  - Please continue to maintain strict I/Os, monitor daily weights, Cr, and K.  - Ned are elevated but likely from demand ischemia.  Would trend  - Cont plavix and statin  - EKG is   - Cont Dig. Please check level  - Hold AC as INR supratheraputic  - He could be aspirating. I would have them do a speech and swallow.   - Further cardiac workup will depend on clinical course.   - All other workup per primary team. Will followup.

## 2018-09-10 NOTE — ED ADULT NURSE NOTE - PSH
H/O excision of Zenker's diverticulum    History of Rotator Cuff Surgery Right and Left    s/p Angioplasty with Stent - 2010    S/P Cataract Surgery - bilateral    S/P Cholecystectomy    S/P ICD (internal cardiac defibrillator) procedure    S/P Prostatectomy - 2005    S/P TURP (Transurethral Resection of Prostate)

## 2018-09-10 NOTE — ED PROVIDER NOTE - MEDICAL DECISION MAKING DETAILS
85y M hx of afib on coumadin, CAD w stent and AICD/PPM, HTN here with PRADO, cough, dec PO, weight loss, generalized weakness. Labs, CXR, EKG, ua.

## 2018-09-10 NOTE — H&P ADULT - ASSESSMENT
85 years old with history of PAF on coumadin (5mg x5 days and 2.5mg x2 days/week), CAD s/p remote PCI (1 stent), Luu's esophagus, a repaired Zenker's diverticulum, hypertension, reflux, hypercholesterolemia, glaucoma, moderate NICM, HCM s/p biV ICD p/w weakness and dyspnea. He has been complaining of more difficulty swallowing x a couple of days, admitted for FTT, dysphagia, elevated troponins, and TIBURCIO.

## 2018-09-10 NOTE — ED ADULT NURSE NOTE - OBJECTIVE STATEMENT
pt presents with wife with c/o cough, dyspnea upon exertion, weight loss and decreased po intake. pt states he generally feels weak, usually ambulatory with cane pt presents with wife with c/o cough, epigastgric pain dyspnea upon exertion, weight loss and decreased po intake. pt states he generally feels weak, usually ambulatory with cane. pt with wound to the top of the right side of his head s/p cancer removal 2 weeks ago

## 2018-09-10 NOTE — ED PROVIDER NOTE - PROGRESS NOTE DETAILS
Pt PCP Dr Mendoza admit s to Dr Mejia, however pt prefers to be admitted to hospitalist. Dr Mejia aware. Cardio Dr Linda to see pt.

## 2018-09-10 NOTE — ED PROVIDER NOTE - OBJECTIVE STATEMENT
85y M hx of afib on coumadin, CAD w stent and AICD/PPM, HTN here with PRADO, cough, dec PO, weight loss, generalized weakness. Wife is at bedside. they report that pt has been having progressive PRADO, can only walk few steps. + non productive cough. No fevers. Dec appetite and dec PO intake. Pt also with difficulty swallowing all consistencies, which is worsening. Report ~18 # weight loss since May. All sx have been progressive over weeks to months. Lives home with wife. Ambulates w cane.   PCP-Dr Mendoza  Cardio- Dr Burgess

## 2018-09-10 NOTE — ED ADULT NURSE NOTE - NSIMPLEMENTINTERV_GEN_ALL_ED
Implemented All Fall with Harm Risk Interventions:  [place] to call system. Call bell, personal items and telephone within reach. Instruct patient to call for assistance. Room bathroom lighting operational. Non-slip footwear when patient is off stretcher. Physically safe environment: no spills, clutter or unnecessary equipment. Stretcher in lowest position, wheels locked, appropriate side rails in place. Provide visual cue, wrist band, yellow gown, etc. Monitor gait and stability. Monitor for mental status changes and reorient to person, place, and time. Review medications for side effects contributing to fall risk. Reinforce activity limits and safety measures with patient and family. Provide visual clues: red socks.

## 2018-09-10 NOTE — ED ADULT NURSE NOTE - ED STAT RN HANDOFF DETAILS
pt with c/o sob, difficulty swallowing, elevated troponin, admitted for PRADO and elevated troponin , pt not in any distress, vss, axox3, pending urine collection , pt refused straight cath, report given to JACKSON AMAYA.

## 2018-09-10 NOTE — H&P ADULT - PROBLEM SELECTOR PLAN 3
TIBURCIO, likely 2/2 dehydration and poor PO intake   - Continue with gentle hydration with IVF at 75cc/hr  - monitor BMP TIBURCIO, likely 2/2 dehydration and poor PO intake, ?ckd3 will need baseline suggest primary team to reach out to pmd  - Continue with gentle hydration with IVF at 75cc/hr  - monitor BMP

## 2018-09-10 NOTE — ED ADULT NURSE NOTE - PMH
AF (atrial fibrillation)  Cardioversion 5/2012  CAD (coronary artery disease)    CHF (congestive heart failure)    GERD (gastroesophageal reflux disease)    Glaucoma    HTN (Hypertension)

## 2018-09-10 NOTE — H&P ADULT - PROBLEM SELECTOR PLAN 1
FTT, likely 2/2 poor PO intake, dysphagia, age-related changes  -admit to telemetry  -keep NPO   -bedside speech and swallow  -f/u Modified barium swallow AM  - Nutrition consult: encourage increase in calorie intake, recommend Ensure   - PT consult  - fall precautions  - safety precautions FTT, likely 2/2 poor PO intake, dysphagia, age-related changes  -admit to telemetry  -keep NPO   -bedside speech and swallow  -f/u Modified barium swallow AM  - Nutrition consult: encourage increase in calorie intake, recommend Ensure   - PT consult  - fall precautions  - safety precautions  -apprec palliative recs Dr. Adams, may need molst pending goals of care

## 2018-09-10 NOTE — H&P ADULT - PROBLEM SELECTOR PLAN 9
Glaucoma, chronic.   - continue timolol eye drops Glaucoma, chronic.   - continue timolol eye drops  - medications confirmed by [facility] pharmacy

## 2018-09-10 NOTE — H&P ADULT - HISTORY OF PRESENT ILLNESS
85 years old with history of PAF on coumadin (5mg x5 days and 2.5mg x2 days/week), CAD s/p remote PCI (1 stent), Luu's esophagus, a repaired Zenker's diverticulum, hypertension, reflux, hypercholesterolemia, glaucoma, moderate NICM, HCM s/p biV ICD p/w weakness and dyspnea. He has been complaining of more difficulty swallowing x a couple of days. He cannot swallow water at this point. He reports losing 18lbs since May but admits he has no appetite. He notes that he has gotten more dyspneic recently and can only walk a few feet. Despite using a cane, he reports difficulty walking. Recently he had his aldactone reduced by half because of weakness by Dr. Burgess. His PPM was recently interrogated (2 weeks ago). He has mild lower ext edema that is unchanged. Additionally, he reports that he has a small wound on his 2nd digit of right foot that is being managed by outpatient podiatrist. Denies any   palpitations, PND, orthopnea, near syncope, syncope,  stroke like symptoms, pain with swallowing.     In the ED, the patient's vital signs were T: 98, HR 60, /70, R: 16, SpO2 98% on RA. CBC WNL. PT/INR/PTT 43.1/3.85/47.0. CMP significant for BUN/Cr 52/1.80. Albumin 3.0. CE's significant for Troponin 0.245, CKMB 3.4. Serum BNP 57264. CXR: Stable cardiomegaly with atherosclerotic aorta. Left cardiac pacer reidentified in situ. Hyperinflated lungs. Fibrotic atelectatic changes right mid and lower lung field similar to prior. New small right and small-to-moderate left pleural effusion. Suture anchors project over the bilateral shoulders. EKG: . Dr. Linda evaluated patient in ED. UA pending.

## 2018-09-10 NOTE — H&P ADULT - ATTENDING COMMENTS
85 years old with history of PAF on coumadin (5mg x5 days and 2.5mg x2 days/week), CAD s/p remote PCI (1 stent), Luu's esophagus, a repaired Zenker's diverticulum, hypertension, reflux, hypercholesterolemia, glaucoma, moderate NICM, HCM s/p biV ICD p/w weakness and dyspnea. He has been complaining of more difficulty swallowing x a couple of days, admitted for FTT, dysphagia, elevated troponins, and TIBURCIO on ?ckd. PLan: monitor on tele, fluid balance, apprec pulm/pallaitive recs, s/s with mbs for diet advancement apprec nutrition recs, monitor clinical course, trend renal function, if not improving consider renal consult, trend inr as supratherapeutic and dose for goal of 2-3, PT eval once medically improved

## 2018-09-10 NOTE — CONSULT NOTE ADULT - SUBJECTIVE AND OBJECTIVE BOX
CHIEF COMPLAINT: Patient is a 85y old  Male who presents with a chief complaint of     HPI: 84 years old with history of PAF AC, CAD s/p remote PCI Luu's esophagus, a repaired Zenker's diverticulum, hypertension, reflux, hypercholesterolemia, glaucoma, moderate NICM, HCM s/p biV ICD p/w weakness and dyspnea. He has been complaining of more difficulty swallowing. He cannot swallow water at this point. He notes taht he ahs gotten more dyspneic recently and can only walk a few feet. Recently he had his aldactone reduced by half because of weakness by Dr. Burgess. He has mild lower ext edema that is unchanged.   Denies any   palpitations, PND, orthopnea, near syncope, syncope,  stroke like symptoms.     EKG:     REVIEW OF SYSTEMS:   All other review of systems are negative unless indicated above    PAST MEDICAL & SURGICAL HISTORY:  AF (atrial fibrillation): Cardioversion 5/2012  GERD (gastroesophageal reflux disease)  CHF (congestive heart failure)  CAD (coronary artery disease)  Glaucoma  HTN (Hypertension)  S/P ICD (internal cardiac defibrillator) procedure  H/O excision of Zenker's diverticulum  S/P Cataract Surgery - bilateral  s/p Angioplasty with Stent - 2010  History of Rotator Cuff Surgery Right and Left  S/P Cholecystectomy  S/P TURP (Transurethral Resection of Prostate)  S/P Prostatectomy - 2005      SOCIAL HISTORY:  No tobacco, ethanol, or drug abuse.    FAMILY HISTORY:  No pertinent family history in first degree relatives    No family history of acute MI or sudden cardiac death.       Allergies    No Known Allergies    Intolerances        Home meds:  Home Medications:  amoxicillin 500 mg oral capsule: 1 cap(s) orally every 8 hours for 10 days (10 Sep 2018 14:21)  atorvastatin 40 mg oral tablet: 1 tab(s) orally once a day (at bedtime) (10 Sep 2018 14:21)  clopidogrel 75 mg oral tablet: 1 tab(s) orally once a day (10 Sep 2018 14:21)  Combigan: 1 drop(s) to each affected eye once a day (10 Sep 2018 14:21)  digoxin 125 mcg (0.125 mg) oral tablet: 1 tab(s) orally once a day (10 Sep 2018 14:21)  docusate 10 mg/mL oral liquid: 10 milliliter(s) orally 3 times a day (10 Sep 2018 14:21)  doxazosin: 2 milligram(s) orally once a day (at bedtime) (10 Sep 2018 14:21)  lactobacillus acidophilus oral capsule: 1  orally 3 times a day (with meals) (26 May 2015 21:40)  Lasix 20 mg oral tablet: 1 tab(s) orally once a day hold for SBP,110 (26 May 2015 21:40)  metoprolol: 25 milligram(s) orally 2 times a day, hold for SBP&lt;110 HR&lt;60 (26 May 2015 21:40)  Multiple Vitamins oral tablet: 1 tab(s) orally once a day (26 May 2015 21:40)  senna oral tablet: 2 tab(s) orally once a day (at bedtime) (26 May 2015 21:40)  timolol 0.5% ophthalmic solution: 1 drop(s) to each affected eye once a day (26 May 2015 21:40)  warfarin 2.5 mg oral tablet: 1 tab(s) orally once a day, hold for INR&gt;3 (26 May 2015 21:40)        VITAL SIGNS:   Vital Signs Last 24 Hrs  T(C): 36.7 (10 Sep 2018 14:18), Max: 36.7 (10 Sep 2018 14:18)  T(F): 98 (10 Sep 2018 14:18), Max: 98 (10 Sep 2018 14:18)  HR: 60 (10 Sep 2018 14:18) (60 - 60)  BP: 105/70 (10 Sep 2018 14:18) (105/70 - 105/70)  BP(mean): --  RR: 16 (10 Sep 2018 14:18) (16 - 16)  SpO2: 98% (10 Sep 2018 14:18) (98% - 98%)    I&O's Summary      On Exam:     Constitutional: NAD, appear frail cachectic  HEENT: Moist Mucous Membranes, Anicteric  Pulmonary: Decreased breath sounds b/l. No rales, crackles or wheeze appreciated.   Cardiovascular: Regular, S1 and S2, distant   Gastrointestinal: Bowel Sounds present, soft, nontender.   Lymph: trace peripheral edema. No lymphadenopathy.  Skin: No visible rashes or ulcers.  Psych:  Mood & affect appropriate    LABS: All Labs Reviewed:                        12.0   9.91  )-----------( 249      ( 10 Sep 2018 16:34 )             35.5     10 Sep 2018 16:34    140    |  105    |  52     ----------------------------<  82     4.6     |  26     |  1.80     Ca    9.0        10 Sep 2018 16:34    TPro  8.2    /  Alb  3.0    /  TBili  1.2    /  DBili  x      /  AST  33     /  ALT  16     /  AlkPhos  100    10 Sep 2018 16:34    PT/INR - ( 10 Sep 2018 16:33 )   PT: 43.1 sec;   INR: 3.85 ratio         PTT - ( 10 Sep 2018 16:33 )  PTT:47.0 sec  CARDIAC MARKERS ( 10 Sep 2018 16:34 )  .245 ng/mL / x     / x     / x     / 3.4 ng/mL      Blood Culture:   09-10 @ 16:34  Pro Bnp [id number]    09-10 @ 16:34  TSH: 2.87          RADIOLOGY:  < from: Xray Chest 1 View- PORTABLE-Urgent (09.10.18 @ 16:14) >    EXAM:  XR CHEST PORTABLE URGENT 1V                            PROCEDURE DATE:  09/10/2018          INTERPRETATION:  Cough, chest pain.    AP chest. Prior 5/17/2015.    Stable cardiomegaly with atherosclerotic aorta. Left cardiac pacer   reidentified in situ. Hyperinflated lungs. Fibrotic atelectatic changes   right mid and lower lung field similar to prior. New small right and   small-to-moderate left pleural effusion. Suture anchors project over the   bilateral shoulders.    Impression: As above                NISA ESCOBAR M.D., ATTENDING RADIOLOGIST  This document has been electronically signed. Sep 10 2018  4:17PM                < end of copied text >

## 2018-09-10 NOTE — H&P ADULT - PROBLEM SELECTOR PLAN 4
PAF on home coumadin (5mg x5 days/week, 2.5mg x2 days/week)  - INR is supratherapeutic today. Will hold coumadin and dose daily   - Continue digoxin 0.125mcg. Will convert to IV  - monitor on Tele  - cardio following

## 2018-09-10 NOTE — H&P ADULT - PROBLEM SELECTOR PLAN 5
PRADO, likely related to New small right and small-to-moderate left pleural effusions as seen on CXR. Patient additionally has HFpEF (EF 40%).   -monitor on tele  -monitor vital signs PRADO, likely related to New small right and small-to-moderate left pleural effusions as seen on CXR. Patient additionally has HFpEF (EF 40%).   -monitor on tele  -monitor vital signs  -apprec pulkimberly parada as well Dr. Adams

## 2018-09-10 NOTE — H&P ADULT - PROBLEM SELECTOR PLAN 7
HTN, chronic.   - On home Aldactone- will hold as per cardio recs  - On home Metoprolol succinate 25mg BID. Will change to IV lopressor 5mg q6hrs with hold parameters  - monitor vital signs

## 2018-09-10 NOTE — ED PROVIDER NOTE - PHYSICAL EXAMINATION
Gen: Thin frail elderly male in NAD   HEENT: NC dark scabbed lesion to R top of head PERRL EOMI normal pharynx  Neck: supple  CV: RRR, no murmur  Lung: CTA BL  Abd: +BS soft NTND  Ext: wwp, palp pulses, FROMx4, no cce  Neuro: CN grossly intact, sensation intact, motor 5/5 throughout Gen: Thin frail elderly male in NAD   HEENT: NC dark scabbed lesion to R top of head EOMI normal pharynx  Neck: supple  CV: RRR, no murmur  Lung: CTA BL dec BL Bases   Abd: +BS soft NTND  Ext: wwp, palp pulses, FROMx4, no LE edema   Neuro: Awake alert CN grossly intact, sensation intact, moves all ext

## 2018-09-10 NOTE — ED PROVIDER NOTE - CARE PLAN
Principal Discharge DX:	Elevated troponin  Secondary Diagnosis:	PRADO (dyspnea on exertion)  Secondary Diagnosis:	Dysphagia

## 2018-09-11 DIAGNOSIS — R13.10 DYSPHAGIA, UNSPECIFIED: ICD-10-CM

## 2018-09-11 DIAGNOSIS — K22.5 DIVERTICULUM OF ESOPHAGUS, ACQUIRED: ICD-10-CM

## 2018-09-11 LAB
ANION GAP SERPL CALC-SCNC: 7 MMOL/L — SIGNIFICANT CHANGE UP (ref 5–17)
BUN SERPL-MCNC: 48 MG/DL — HIGH (ref 7–23)
CALCIUM SERPL-MCNC: 9 MG/DL — SIGNIFICANT CHANGE UP (ref 8.5–10.1)
CHLORIDE SERPL-SCNC: 106 MMOL/L — SIGNIFICANT CHANGE UP (ref 96–108)
CK MB BLD-MCNC: 7.1 % — HIGH (ref 0–3.5)
CK MB CFR SERPL CALC: 4 NG/ML — HIGH (ref 0–3.6)
CK SERPL-CCNC: 56 U/L — SIGNIFICANT CHANGE UP (ref 26–308)
CO2 SERPL-SCNC: 29 MMOL/L — SIGNIFICANT CHANGE UP (ref 22–31)
CREAT SERPL-MCNC: 1.6 MG/DL — HIGH (ref 0.5–1.3)
DIGOXIN SERPL-MCNC: 1 NG/ML — SIGNIFICANT CHANGE UP (ref 0.8–2)
GLUCOSE SERPL-MCNC: 68 MG/DL — LOW (ref 70–99)
HCT VFR BLD CALC: 32.9 % — LOW (ref 39–50)
HGB BLD-MCNC: 10.9 G/DL — LOW (ref 13–17)
INR BLD: 4.29 RATIO — HIGH (ref 0.88–1.16)
MCHC RBC-ENTMCNC: 32.3 PG — SIGNIFICANT CHANGE UP (ref 27–34)
MCHC RBC-ENTMCNC: 33.1 GM/DL — SIGNIFICANT CHANGE UP (ref 32–36)
MCV RBC AUTO: 97.6 FL — SIGNIFICANT CHANGE UP (ref 80–100)
NRBC # BLD: 0 /100 WBCS — SIGNIFICANT CHANGE UP (ref 0–0)
PLATELET # BLD AUTO: 213 K/UL — SIGNIFICANT CHANGE UP (ref 150–400)
POTASSIUM SERPL-MCNC: 4.3 MMOL/L — SIGNIFICANT CHANGE UP (ref 3.5–5.3)
POTASSIUM SERPL-SCNC: 4.3 MMOL/L — SIGNIFICANT CHANGE UP (ref 3.5–5.3)
PROTHROM AB SERPL-ACNC: 48.2 SEC — HIGH (ref 9.8–12.7)
RBC # BLD: 3.37 M/UL — LOW (ref 4.2–5.8)
RBC # FLD: 14.7 % — HIGH (ref 10.3–14.5)
SODIUM SERPL-SCNC: 142 MMOL/L — SIGNIFICANT CHANGE UP (ref 135–145)
T3 SERPL-MCNC: 62 NG/DL — LOW (ref 80–200)
T4 AB SER-ACNC: 6.2 UG/DL — SIGNIFICANT CHANGE UP (ref 4.6–12)
TROPONIN I SERPL-MCNC: 0.25 NG/ML — HIGH (ref 0.01–0.04)
WBC # BLD: 8.32 K/UL — SIGNIFICANT CHANGE UP (ref 3.8–10.5)
WBC # FLD AUTO: 8.32 K/UL — SIGNIFICANT CHANGE UP (ref 3.8–10.5)

## 2018-09-11 PROCEDURE — 71250 CT THORAX DX C-: CPT | Mod: 26

## 2018-09-11 PROCEDURE — 74230 X-RAY XM SWLNG FUNCJ C+: CPT | Mod: 26

## 2018-09-11 PROCEDURE — 99233 SBSQ HOSP IP/OBS HIGH 50: CPT

## 2018-09-11 PROCEDURE — 99232 SBSQ HOSP IP/OBS MODERATE 35: CPT

## 2018-09-11 RX ORDER — FUROSEMIDE 40 MG
40 TABLET ORAL DAILY
Qty: 0 | Refills: 0 | Status: DISCONTINUED | OUTPATIENT
Start: 2018-09-11 | End: 2018-09-11

## 2018-09-11 RX ORDER — PANTOPRAZOLE SODIUM 20 MG/1
40 TABLET, DELAYED RELEASE ORAL DAILY
Qty: 0 | Refills: 0 | Status: DISCONTINUED | OUTPATIENT
Start: 2018-09-11 | End: 2018-09-20

## 2018-09-11 RX ORDER — ALBUTEROL 90 UG/1
2.5 AEROSOL, METERED ORAL EVERY 6 HOURS
Qty: 0 | Refills: 0 | Status: DISCONTINUED | OUTPATIENT
Start: 2018-09-11 | End: 2018-09-12

## 2018-09-11 RX ADMIN — Medication 1 DROP(S): at 05:07

## 2018-09-11 RX ADMIN — Medication 0.25 MILLIGRAM(S): at 12:09

## 2018-09-11 RX ADMIN — Medication 1 DROP(S): at 18:31

## 2018-09-11 NOTE — PROGRESS NOTE ADULT - SUBJECTIVE AND OBJECTIVE BOX
NP Progress Note     [facility] -- Cory Fragoso, Niurka, Aditya, Alexei Wadsworth Savella  Office # [phone]      Follow Up:  Consult    HPI:  85 years old with history of PAF on coumadin (5mg x5 days and 2.5mg x2 days/week), CAD s/p remote PCI (1 stent), Luu's esophagus, a repaired Zenker's diverticulum, hypertension, reflux, hypercholesterolemia, glaucoma, moderate NICM, HCM s/p biV ICD p/w weakness and dyspnea. He has been complaining of more difficulty swallowing x a couple of days. He cannot swallow water at this point. He reports losing 18lbs since May but admits he has no appetite. He notes that he has gotten more dyspneic recently and can only walk a few feet. Despite using a cane, he reports difficulty walking. Recently he had his aldactone reduced by half because of weakness by Dr. Burgess. His PPM was recently interrogated (2 weeks ago). He has mild lower ext edema that is unchanged. Additionally, he reports that he has a small wound on his 2nd digit of right foot that is being managed by outpatient podiatrist. Denies any   palpitations, PND, orthopnea, near syncope, syncope,  stroke like symptoms, pain with swallowing.     In the ED, the patient's vital signs were T: 98, HR 60, /70, R: 16, SpO2 98% on RA. CBC WNL. PT/INR/PTT 43.1/3.85/47.0. CMP significant for BUN/Cr 52/1.80. Albumin 3.0. CE's significant for Troponin 0.245, CKMB 3.4. Serum BNP 27064. CXR: Stable cardiomegaly with atherosclerotic aorta. Left cardiac pacer reidentified in situ. Hyperinflated lungs. Fibrotic atelectatic changes right mid and lower lung field similar to prior. New small right and small-to-moderate left pleural effusion. Suture anchors project over the bilateral shoulders. EKG: . Dr. Linda evaluated patient in ED. UA pending. (10 Sep 2018 19:01)        Subjective/Observations: Pt. seen and examined and evaluated. Pt. resting comfortably in bed in NAD, with no respiratory distress, no chest pain, dyspnea, palpitations, PND, or orthopnea.      REVIEW OF SYSTEMS: All other review of systems is negative unless indicated above    PAST MEDICAL & SURGICAL HISTORY:  AF (atrial fibrillation): Cardioversion 5/2012  GERD (gastroesophageal reflux disease)  CHF (congestive heart failure)  CAD (coronary artery disease)  Glaucoma  HTN (Hypertension)  S/P ICD (internal cardiac defibrillator) procedure  H/O excision of Zenker's diverticulum  S/P Cataract Surgery - bilateral  s/p Angioplasty with Stent - 2010  History of Rotator Cuff Surgery Right and Left  S/P Cholecystectomy  S/P TURP (Transurethral Resection of Prostate)  S/P Prostatectomy - 2005      MEDICATIONS  (STANDING):  digoxin  Injectable 0.25 milliGRAM(s) IV Push daily  influenza   Vaccine 0.5 milliLiter(s) IntraMuscular once  metoprolol tartrate Injectable 5 milliGRAM(s) IV Push every 6 hours  timolol 0.5% Solution 1 Drop(s) Both EYES two times a day    MEDICATIONS  (PRN):  ALBUTerol    0.083% 2.5 milliGRAM(s) Nebulizer every 6 hours PRN Shortness of Breath and/or Wheezing      Allergies    No Known Allergies    Intolerances          Vital Signs Last 24 Hrs  T(C): 36.4 (11 Sep 2018 08:17), Max: 36.9 (10 Sep 2018 21:39)  T(F): 97.5 (11 Sep 2018 08:17), Max: 98.5 (10 Sep 2018 21:39)  HR: 70 (11 Sep 2018 08:17) (60 - 72)  BP: 99/64 (11 Sep 2018 08:17) (90/60 - 105/70)  BP(mean): --  RR: 17 (11 Sep 2018 08:17) (16 - 18)  SpO2: 97% (11 Sep 2018 08:17) (97% - 100%)    I&O's Summary    10 Sep 2018 07:01  -  11 Sep 2018 07:00  --------------------------------------------------------  IN: 750 mL / OUT: 200 mL / NET: 550 mL      Weight (kg): 68 (09-10 @ 14:18)        LABS: All Labs Reviewed:                        10.9   8.32  )-----------( 213      ( 11 Sep 2018 07:37 )             32.9                 11 Sep 2018 07:37    142    |  106    |  48     ----------------------------<  68     4.3     |  29     |  1.60   Ca    9.0        11 Sep 2018 07:37      TPro  8.2    /  Alb  3.0    /  TBili  1.2    /  DBili  x      /  AST  33     /  ALT  16     /  AlkPhos  100    10 Sep 2018 16:34    PT/INR - ( 11 Sep 2018 07:37 )   PT: 48.2 sec;   INR: 4.29 ratio         PTT - ( 10 Sep 2018 16:33 )  PTT:47.0 sec  CARDIAC MARKERS ( 11 Sep 2018 07:37 )  .247 ng/mL / x     / 56 U/L / x     / 4.0 ng/mL  CARDIAC MARKERS ( 10 Sep 2018 23:32 )  .238 ng/mL / x     / 49 U/L / x     / 3.6 ng/mL  CARDIAC MARKERS ( 10 Sep 2018 16:34 )  .245 ng/mL / x     / x     / x     / 3.4 ng/mL           Echo:    Stress Testing:     Cath:    Imaging:    Interpretation of Telemetry:      Physical Exam:  Appearance: [ ] Normal  [ ] abnormal [ ] NAD   Eyes: [ ] PERRL [ ] EOMI  HEENT: [ ] Normal [ ] Abnormal oral mucosa [ ]NC/AT  Cardiovascular: [ ] S1 [ ] S2 [ ] RRR [ ] m/r/g [ ]edema [ ] JVP  Procedural Access Site: [ ]  hematoma [ ] tender to palpation [ ] 2+ pulse [ ] bruit [ ] Ecchymosis  Respiratory: [ ] Clear to auscultation bilaterally  Gastrointestinal: [ ] Soft [ ] tenderness[ ] distension [ ] BS  Musculoskeletal: [ ] clubbing [ ] joint deformity   Neurologic: [ ] Non-focal  Lymphatic: [ ] lymphadenopathy  Psychiatry: [ ] AAOx3  [ ] confused [ ] disoriented [ ] Mood & affect appropriate  Skin: [ ]  rashes [ ] ecchymoses [ ] cyanosis NP Progress Note     [facility] -- Cory Fragoso, Niurka, Aditya, Alexei Wadsworth Savella  Office # [phone]      Follow Up:  Consult    HPI:  85 years old with history of PAF on coumadin (5mg x5 days and 2.5mg x2 days/week), CAD s/p remote PCI (1 stent), Luu's esophagus, a repaired Zenker's diverticulum, hypertension, reflux, hypercholesterolemia, glaucoma, moderate NICM, HCM s/p biV ICD p/w weakness and dyspnea. He has been complaining of more difficulty swallowing x a couple of days. He cannot swallow water at this point. He reports losing 18lbs since May but admits he has no appetite. He notes that he has gotten more dyspneic recently and can only walk a few feet. Despite using a cane, he reports difficulty walking. Recently he had his aldactone reduced by half because of weakness by Dr. Burgess. His PPM was recently interrogated (2 weeks ago). He has mild lower ext edema that is unchanged. Additionally, he reports that he has a small wound on his 2nd digit of right foot that is being managed by outpatient podiatrist. Denies any   palpitations, PND, orthopnea, near syncope, syncope,  stroke like symptoms, pain with swallowing.     In the ED, the patient's vital signs were T: 98, HR 60, /70, R: 16, SpO2 98% on RA. CBC WNL. PT/INR/PTT 43.1/3.85/47.0. CMP significant for BUN/Cr 52/1.80. Albumin 3.0. CE's significant for Troponin 0.245, CKMB 3.4. Serum BNP 00258. CXR: Stable cardiomegaly with atherosclerotic aorta. Left cardiac pacer reidentified in situ. Hyperinflated lungs. Fibrotic atelectatic changes right mid and lower lung field similar to prior. New small right and small-to-moderate left pleural effusion. Suture anchors project over the bilateral shoulders. EKG: . Dr. Linda evaluated patient in ED. UA pending. (10 Sep 2018 19:01)        Subjective/Observations: Pt. seen and examined and evaluated. Pt. resting comfortably in bed in NAD, with no respiratory distress, no chest pain, dyspnea, palpitations, PND, or orthopnea.      REVIEW OF SYSTEMS: All other review of systems is negative unless indicated above    PAST MEDICAL & SURGICAL HISTORY:  AF (atrial fibrillation): Cardioversion 5/2012  GERD (gastroesophageal reflux disease)  CHF (congestive heart failure)  CAD (coronary artery disease)  Glaucoma  HTN (Hypertension)  S/P ICD (internal cardiac defibrillator) procedure  H/O excision of Zenker's diverticulum  S/P Cataract Surgery - bilateral  s/p Angioplasty with Stent - 2010  History of Rotator Cuff Surgery Right and Left  S/P Cholecystectomy  S/P TURP (Transurethral Resection of Prostate)  S/P Prostatectomy - 2005      MEDICATIONS  (STANDING):  digoxin  Injectable 0.25 milliGRAM(s) IV Push daily  influenza   Vaccine 0.5 milliLiter(s) IntraMuscular once  metoprolol tartrate Injectable 5 milliGRAM(s) IV Push every 6 hours  timolol 0.5% Solution 1 Drop(s) Both EYES two times a day    MEDICATIONS  (PRN):  ALBUTerol    0.083% 2.5 milliGRAM(s) Nebulizer every 6 hours PRN Shortness of Breath and/or Wheezing      Allergies: No Known Allergies    Intolerances    Vital Signs Last 24 Hrs  T(C): 36.4 (11 Sep 2018 08:17), Max: 36.9 (10 Sep 2018 21:39)  T(F): 97.5 (11 Sep 2018 08:17), Max: 98.5 (10 Sep 2018 21:39)  HR: 70 (11 Sep 2018 08:17) (60 - 72)  BP: 99/64 (11 Sep 2018 08:17) (90/60 - 105/70)  BP(mean): --  RR: 17 (11 Sep 2018 08:17) (16 - 18)  SpO2: 97% (11 Sep 2018 08:17) (97% - 100%)    I&O's Summary    10 Sep 2018 07:01  -  11 Sep 2018 07:00  --------------------------------------------------------  IN: 750 mL / OUT: 200 mL / NET: 550 mL      Weight (kg): 68 (09-10 @ 14:18)        LABS: All Labs Reviewed:                        10.9   8.32  )-----------( 213      ( 11 Sep 2018 07:37 )             32.9                 11 Sep 2018 07:37    142    |  106    |  48     ----------------------------<  68     4.3     |  29     |  1.60   Ca    9.0        11 Sep 2018 07:37      TPro  8.2    /  Alb  3.0    /  TBili  1.2    /  DBili  x      /  AST  33     /  ALT  16     /  AlkPhos  100    10 Sep 2018 16:34    PT/INR - ( 11 Sep 2018 07:37 )   PT: 48.2 sec;   INR: 4.29 ratio         PTT - ( 10 Sep 2018 16:33 )  PTT:47.0 sec  CARDIAC MARKERS ( 11 Sep 2018 07:37 )  .247 ng/mL / x     / 56 U/L / x     / 4.0 ng/mL  CARDIAC MARKERS ( 10 Sep 2018 23:32 )  .238 ng/mL / x     / 49 U/L / x     / 3.6 ng/mL  CARDIAC MARKERS ( 10 Sep 2018 16:34 )  .245 ng/mL / x     / x     / x     / 3.4 ng/mL           Echo:    Stress Testing:     Cath:    Imaging:    Interpretation of Telemetry: Overnight on telemetry V-paced       Physical Exam:  Appearance: [ ] Normal  [ ] abnormal [X ] NAD   Eyes: [ ] PERRL [ ] EOMI  HEENT: [ ] Normal [ ] Abnormal oral mucosa [ ]NC/AT  Cardiovascular: [X ] S1 [X ] S2 [ ] RRR [ ] m/r/g [ ]edema [ ] JVP  Procedural Access Site: [ ]  hematoma [ ] tender to palpation [ ] 2+ pulse [ ] bruit [ ] Ecchymosis  Respiratory: [X ] Clear to auscultation bilaterally  Gastrointestinal: [ ] Soft [ ] tenderness[ ] distension [ ] BS  Musculoskeletal: [ ] clubbing [ ] joint deformity   Neurologic: [ ] Non-focal  Lymphatic: [ ] lymphadenopathy  Psychiatry: [X ] AAOx3  [ ] confused [ ] disoriented [ ] Mood & affect appropriate  Skin: [ ]  rashes [ ] ecchymoses [ ] cyanosis

## 2018-09-11 NOTE — DIETITIAN INITIAL EVALUATION ADULT. - PROBLEM SELECTOR PLAN 1
FTT, likely 2/2 poor PO intake, dysphagia, age-related changes  -admit to telemetry  -keep NPO   -bedside speech and swallow  -f/u Modified barium swallow AM  - Nutrition consult: encourage increase in calorie intake, recommend Ensure   - PT consult  - fall precautions  - safety precautions  -apprec palliative recs Dr. Adams, may need molst pending goals of care

## 2018-09-11 NOTE — DIETITIAN INITIAL EVALUATION ADULT. - PROBLEM SELECTOR PLAN 9
Glaucoma, chronic.   - continue timolol eye drops  - medications confirmed by [facility] pharmacy

## 2018-09-11 NOTE — SWALLOW BEDSIDE ASSESSMENT ADULT - COMMENTS
85 years old admitted with dysphagia, odynophagia c history of PAF on coumadin, CAD s/p remote PCI , Luu's esophagus, a repaired Zenker's diverticulum over 10 years ago, hypertension, reflux, hypercholesterolemia, glaucoma, weakness and dyspnea.  Pt c adequate labial strippage of bolus off spoon, slow mastication, timely trigger of swallow. Pt c throat clear / cough reflex response c thin and nectar thick liquids 85 years old admitted with dysphagia, odynophagia c history of PAF on coumadin, CAD s/p remote PCI , Luu's esophagus, a repaired Zenker's diverticulum over 10 years ago, hypertension, reflux, hypercholesterolemia, glaucoma, weakness and dyspnea.  Pt c adequate labial strippage of bolus off spoon, slow mastication, timely trigger of swallow. Pt c throat clear / cough reflex response c all textures noted

## 2018-09-11 NOTE — CONSULT NOTE ADULT - SUBJECTIVE AND OBJECTIVE BOX
Date/Time Patient Seen:  		  Referring MD:   Data Reviewed	       Patient is a 85y old  Male who presents with a chief complaint of sob, dyspnea, difficulty walking, dysphagia (10 Sep 2018 19:01)      Subjective/HPI    in bed  seen and examined, vs and meds reviewed, labs and imaging reviewed  medical notes reviewed  H and P reviewed  ER provider notes reviewed  old records reviewed    pt is on LASIX and on IVF    H&P Adult [Charted Location: [place] [address]] [Authored: 10-Sep-2018 19:01]- for Visit: [id number], Complete, Revised, Signed in Full, General    History and Physical:   Source of Information	Chart(s), Patient, Spouse/Significant Other, Child  Outpatient Providers	PCP: Dr. Mendoza  Cardio: Dr. Burgess     Language:  · Patient/Family of Limited English Proficiency	No       History of Present Illness:  Reason for Admission: sob, dyspnea, difficulty walking, dysphagia  History of Present Illness:   85 years old with history of PAF on coumadin (5mg x5 days and 2.5mg x2 days/week), CAD s/p remote PCI (1 stent), Luu's esophagus, a repaired Zenker's diverticulum, hypertension, reflux, hypercholesterolemia, glaucoma, moderate NICM, HCM s/p biV ICD p/w weakness and dyspnea. He has been complaining of more difficulty swallowing x a couple of days. He cannot swallow water at this point. He reports losing 18lbs since May but admits he has no appetite. He notes that he has gotten more dyspneic recently and can only walk a few feet. Despite using a cane, he reports difficulty walking. Recently he had his aldactone reduced by half because of weakness by Dr. Burgess. His PPM was recently interrogated (2 weeks ago). He has mild lower ext edema that is unchanged. Additionally, he reports that he has a small wound on his 2nd digit of right foot that is being managed by outpatient podiatrist. Denies any   palpitations, PND, orthopnea, near syncope, syncope,  stroke like symptoms, pain with swallowing.     In the ED, the patient's vital signs were T: 98, HR 60, /70, R: 16, SpO2 98% on RA. CBC WNL. PT/INR/PTT 43.1/3.85/47.0. CMP significant for BUN/Cr 52/1.80. Albumin 3.0. CE's significant for Troponin 0.245, CKMB 3.4. Serum BNP 58171. CXR: Stable cardiomegaly with atherosclerotic aorta. Left cardiac pacer reidentified in situ. Hyperinflated lungs. Fibrotic atelectatic changes right mid and lower lung field similar to prior. New small right and small-to-moderate left pleural effusion. Suture anchors project over the bilateral shoulders. EKG: . Dr. Linda evaluated patient in ED. UA pending.      PAST MEDICAL & SURGICAL HISTORY:  AF (atrial fibrillation): Cardioversion 5/2012  GERD (gastroesophageal reflux disease)  CHF (congestive heart failure)  CAD (coronary artery disease)  Glaucoma  HTN (Hypertension)  S/P ICD (internal cardiac defibrillator) procedure  H/O excision of Zenker's diverticulum  S/P Cataract Surgery - bilateral  s/p Angioplasty with Stent - 2010  History of Rotator Cuff Surgery Right and Left  S/P Cholecystectomy  S/P TURP (Transurethral Resection of Prostate)  S/P Prostatectomy - 2005  S/P Prostatectomy  S/P Prostatectomy        Medication list         MEDICATIONS  (STANDING):  digoxin  Injectable 0.25 milliGRAM(s) IV Push daily  influenza   Vaccine 0.5 milliLiter(s) IntraMuscular once  metoprolol tartrate Injectable 5 milliGRAM(s) IV Push every 6 hours  timolol 0.5% Solution 1 Drop(s) Both EYES two times a day    MEDICATIONS  (PRN):  ALBUTerol    0.083% 2.5 milliGRAM(s) Nebulizer every 6 hours PRN Shortness of Breath and/or Wheezing         Vitals log        ICU Vital Signs Last 24 Hrs  T(C): 36.6 (11 Sep 2018 04:23), Max: 36.9 (10 Sep 2018 21:39)  T(F): 97.9 (11 Sep 2018 04:23), Max: 98.5 (10 Sep 2018 21:39)  HR: 70 (11 Sep 2018 04:23) (60 - 72)  BP: 99/64 (11 Sep 2018 04:23) (90/60 - 105/70)  BP(mean): --  ABP: --  ABP(mean): --  RR: 18 (11 Sep 2018 04:23) (16 - 18)  SpO2: 97% (11 Sep 2018 04:23) (97% - 100%)           Input and Output:  I&O's Detail    10 Sep 2018 07:01  -  11 Sep 2018 06:50  --------------------------------------------------------  IN:    lactated ringers.: 750 mL  Total IN: 750 mL    OUT:    Voided: 200 mL  Total OUT: 200 mL    Total NET: 550 mL    Family History:  No pertinent family history in first degree relatives.     Social History:  Social History (marital status, living situation, occupation, tobacco use, alcohol and drug use, and sexual history): Social History:    	Marital Status:  (  x )    (   ) Single    (   )    (  )   	Lives with: (  ) alone  (  ) children   ( x ) spouse   (  ) parents  (  ) other    	Substance Use (street drugs): ( x ) never used  (  ) other:  	Tobacco Usage:  (  x ) never smoked   (   ) former smoker   (   ) current smoker  (     ) pack year  (        ) last cigarette date  	Alcohol Usage: admits to social EtOH    	Health Management  	   	Immunization Hx:   	( x ) flu shot                               ( 2017    ) date   (x  ) pneumonia shot               (   many years ago  ) date     Tobacco Screening:  · Core Measure Site	Yes  · Has the patient used tobacco in the past 30 days?	No        Lab Data                        12.0   9.91  )-----------( 249      ( 10 Sep 2018 16:34 )             35.5     09-10    140  |  105  |  52<H>  ----------------------------<  82  4.6   |  26  |  1.80<H>    Ca    9.0      10 Sep 2018 16:34    TPro  8.2  /  Alb  3.0<L>  /  TBili  1.2  /  DBili  x   /  AST  33  /  ALT  16  /  AlkPhos  100  09-10      CARDIAC MARKERS ( 10 Sep 2018 23:32 )  .238 ng/mL / x     / 49 U/L / x     / 3.6 ng/mL  CARDIAC MARKERS ( 10 Sep 2018 16:34 )  .245 ng/mL / x     / x     / x     / 3.4 ng/mL        Review of Systems	      Objective     Physical Examination    heart s1s2  lung dec BS  abd soft      Pertinent Lab findings & Imaging      Dubois:  NO   Adequate UO     I&O's Detail    10 Sep 2018 07:01  -  11 Sep 2018 06:50  --------------------------------------------------------  IN:    lactated ringers.: 750 mL  Total IN: 750 mL    OUT:    Voided: 200 mL  Total OUT: 200 mL    Total NET: 550 mL               Discussed with:     Cultures:	        Radiology

## 2018-09-11 NOTE — DIETITIAN INITIAL EVALUATION ADULT. - PROBLEM SELECTOR PLAN 5
PRADO, likely related to New small right and small-to-moderate left pleural effusions as seen on CXR. Patient additionally has HFpEF (EF 40%).   -monitor on tele  -monitor vital signs  -apprec pulkimberly parada as well Dr. Adams

## 2018-09-11 NOTE — PROGRESS NOTE ADULT - ASSESSMENT
84 M of PAF AC, CAD s/p remote PCI Luu's esophagus, a repaired Zenker's diverticulum, hypertension, reflux, hypercholesterolemia, glaucoma, moderate NICM, HCM s/p biV ICD p/w dyspnea.    - He is mildly volume overloaded given his CXR  - Continue lasix 40mg IV daily SBP  DBP 60-69  - May hold his aldactone  - Please continue to maintain strict I/Os, monitor daily weights, Cr, and K.  - Ned are elevated but likely from demand ischemia.  Would trend  - Cont plavix and statin  - Cont Digoxin digoxin level 1.0   - Continue to hole coumadin INR 4.19 Hold AC as INR supra-therapeutic   - He could be aspirating. I would have them do a speech and swallow.   - Further cardiac workup will depend on clinical course.   - All other workup per primary team. Will followup. 84 M of PAF AC, CAD s/p remote PCI Luu's esophagus, a repaired Zenker's diverticulum, hypertension, reflux, hypercholesterolemia, glaucoma, moderate NICM, HCM s/p biV ICD p/w dyspnea.    - He is mildly volume overloaded given his CXR  - Continue lasix 40mg IV daily SBP  DBP 60-69  - May hold his aldactone  - Please continue to maintain strict I/Os, monitor daily weights, Cr, and K.  - Ned are elevated but likely from demand ischemia.  Would trend  - Cont plavix and statin  - Cont Digoxin digoxin level 1.0   - Continue to hole coumadin INR 4.19 Hold AC as INR supra-therapeutic   - He could be aspirating. he should have  a speech and swallow.   - Further cardiac workup will depend on clinical course.   - All other workup per primary team. Will followup. 84 M of PAF AC, CAD s/p remote PCI Luu's esophagus, a repaired Zenker's diverticulum, hypertension, reflux, hypercholesterolemia, glaucoma, moderate NICM, HCM s/p biV ICD p/w dyspnea.    - CT scan not consistent with severe volume overload  - Can attempt a negative balance with IV Lasix to see if it helps his oxygen saturation  - Continue lasix 40mg IV daily SBP  DBP 60-69  - May hold his aldactone  - Please continue to maintain strict I/Os, monitor daily weights, Cr, and K.  - Ned are elevated but likely from demand ischemia.  Would trend  - Cont plavix and statin  - Cont Digoxin digoxin level 1.0   - Continue to hole coumadin INR 4.19 Hold AC as INR supra-therapeutic   - Trend CBC as he is at risk for acute blood loss   - Pulm follow up  - EGD being considered.  Would hold off until we have a better idea what the etiology of his hypoxia is  - Further cardiac workup will depend on clinical course.   - All other workup per primary team. Will followup.

## 2018-09-11 NOTE — CONSULT NOTE ADULT - PROBLEM SELECTOR RECOMMENDATION 9
seen by speech, mbs done, recs for npo  ivf as needed  aspiration precautions, elevate hob  ppi iv  may need egd if agreeable, would require medical/cardiac clearance, would need copy of recent ppm interrogation  if unable to tolerate po, will need goc discussion regarding possible feeding tube  will follow seen by speech, mbs done, recs for npo  ivf as needed  aspiration precautions, elevate hob  ppi iv  agreeable to egd for further eval, will plan for procedure when medically optimized and cleared by pulmonology and cardiology, will need copy of recent ppm interrogation  consider ngt feeds in interim if pt agreeable  nutrition eval for tf recs  dw med attg   will follow

## 2018-09-11 NOTE — CHART NOTE - NSCHARTNOTEFT_GEN_A_CORE
Upon Nutritional Assessment by the Registered Dietitian your patient was determined to meet criteria / has evidence of the following diagnosis/diagnoses:          [ ]  Mild Protein Calorie Malnutrition        [ ]  Moderate Protein Calorie Malnutrition        [x ] Severe Protein Calorie Malnutrition        [ ] Unspecified Protein Calorie Malnutrition        [ ] Underweight / BMI <19        [ ] Morbid Obesity / BMI > 40      Findings as based on:  •  Comprehensive nutrition assessment and consultation  •  Calorie counts (nutrient intake analysis)  •  Food acceptance and intake status from observations by staff  •  Follow up  •  Patient education  •  Intervention secondary to interdisciplinary rounds  •   concerns  * 28% wt loss over past 1 1/2 yrs, <50% po intake, wasting noted to orbital, temporal, clavicle, ribs, tricep.     Treatment:    The following diet has been recommended:  Awaiting decision MD/pt regarding possible surgery, if candidate vs PEG placement.       PROVIDER Section:     By signing this assessment you are acknowledging and agree with the diagnosis/diagnoses assigned by the Registered Dietitian    Comments:

## 2018-09-11 NOTE — PROGRESS NOTE ADULT - PROBLEM SELECTOR PLAN 1
FTT, likely 2/2 poor PO intake, dysphagia, age-related changes  - Significant aspiration and possible Zenker's diverticulum recurrence on MBSS today.  - PT consult  - fall precautions  - safety precautions  - If patient and wife in agreement will place NG tube for nutrition for now until he is stable enough to go for EGD to further eval zenker's diverticulum.

## 2018-09-11 NOTE — CONSULT NOTE ADULT - PROBLEM SELECTOR RECOMMENDATION 9
dyspnea, sob, pleural eff, chf, ckd, gabrielle, atelectasis, eval COPD Emphysema  will check ct chest non contrast this am  I and O  oral and skin care  will hold IVF and LASIX, will need to decide one or the other based on more clinical data and patient appearance  serial labs  serial PE  monitor vs and HD and Sat  keep sat > 88 pct  replete lytes  nutritional optimization  will follow

## 2018-09-11 NOTE — DIETITIAN INITIAL EVALUATION ADULT. - SIGNS/SYMPTOMS
as evidenced by 28% wt loss/1 1/2 yrs, <50% po intake, wasting to temporal,orbital,clavicle,ribs,tri

## 2018-09-11 NOTE — CONSULT NOTE ADULT - SUBJECTIVE AND OBJECTIVE BOX
Chief Complaint:  Patient is a 85y old  Male who presents with a chief complaint of sob, dyspnea, difficulty walking, dysphagia (11 Sep 2018 10:25)    AF (atrial fibrillation)  GERD (gastroesophageal reflux disease)  CHF (congestive heart failure)  CAD (coronary artery disease)  Glaucoma  HTN (Hypertension)  S/P ICD (internal cardiac defibrillator) procedure  H/O excision of Zenker's diverticulum  S/P Cataract Surgery - bilateral  s/p Angioplasty with Stent -   History of Rotator Cuff Surgery Right and Left  S/P Cholecystectomy  S/P TURP (Transurethral Resection of Prostate)  S/P Prostatectomy -   S/P Prostatectomy  S/P Prostatectomy     HPI:  85 years old with history of PAF on coumadin (5mg x5 days and 2.5mg x2 days/week), CAD s/p remote PCI (1 stent), Luu's esophagus, a repaired Zenker's diverticulum, hypertension, reflux, hypercholesterolemia, glaucoma, moderate NICM, HCM s/p biV ICD p/w weakness and dyspnea. He has been complaining of more difficulty swallowing x a couple of days. He cannot swallow water at this point. He reports losing 18lbs since May but admits he has no appetite. He notes that he has gotten more dyspneic recently and can only walk a few feet. Despite using a cane, he reports difficulty walking. Recently he had his aldactone reduced by half because of weakness by Dr. Burgess. His PPM was recently interrogated (2 weeks ago). He has mild lower ext edema that is unchanged. Additionally, he reports that he has a small wound on his 2nd digit of right foot that is being managed by outpatient podiatrist. Denies any   palpitations, PND, orthopnea, near syncope, syncope,  stroke like symptoms, pain with swallowing.     In the ED, the patient's vital signs were T: 98, HR 60, /70, R: 16, SpO2 98% on RA. CBC WNL. PT/INR/PTT 43.1/3.85/47.0. CMP significant for BUN/Cr 52/1.80. Albumin 3.0. CE's significant for Troponin 0.245, CKMB 3.4. Serum BNP 45149. CXR: Stable cardiomegaly with atherosclerotic aorta. Left cardiac pacer reidentified in situ. Hyperinflated lungs. Fibrotic atelectatic changes right mid and lower lung field similar to prior. New small right and small-to-moderate left pleural effusion. Suture anchors project over the bilateral shoulders. EKG: . Dr. Linda evaluated patient in ED. UA pending. (10 Sep 2018 19:01)      gi consulted for dysphagia. chart reviewed. pt seen and examined.    recent vs/labs/imaging reviewed- afebrile  no leukocytosis  hgb trend noted  inr 3.85  no abd imaging  mbs-    No Known Allergies      ALBUTerol    0.083% 2.5 milliGRAM(s) Nebulizer every 6 hours PRN  digoxin  Injectable 0.25 milliGRAM(s) IV Push daily  influenza   Vaccine 0.5 milliLiter(s) IntraMuscular once  metoprolol tartrate Injectable 5 milliGRAM(s) IV Push every 6 hours  timolol 0.5% Solution 1 Drop(s) Both EYES two times a day        FAMILY HISTORY:  No pertinent family history in first degree relatives        Review of Systems:    General:  wt loss  Eyes:  Good vision, no reported pain  ENT:  No sore throat, pain, runny nose, dysphagia  CV:  No pain, palpitations, no lightheadedness  Resp:  No dyspnea, cough, tachypnea, wheezing  GI: see above  :  No pain, bleeding, incontinence, nocturia  Muscle:  No pain, weakness  Neuro:  No weakness, tingling, memory problems  Psych:  No fatigue, insomnia, mood problems, depression  Endocrine:  No polyuria, polydypsia, cold/heat intolerance  Heme:  No petechiae, ecchymosis, easy bruisability  Skin:  No rash, tattoos, scars, edema    Relevant Family History:   n/c    Relevant Social History: n/c      Physical Exam:    Vital Signs:  Vital Signs Last 24 Hrs  T(C): 36.4 (11 Sep 2018 08:17), Max: 36.9 (10 Sep 2018 21:39)  T(F): 97.5 (11 Sep 2018 08:17), Max: 98.5 (10 Sep 2018 21:39)  HR: 70 (11 Sep 2018 08:17) (60 - 72)  BP: 99/64 (11 Sep 2018 08:17) (90/60 - 105/70)  BP(mean): --  RR: 17 (11 Sep 2018 08:17) (16 - 18)  SpO2: 97% (11 Sep 2018 08:17) (97% - 100%)  Daily Height in cm: 190.5 (10 Sep 2018 14:18)    Daily Weight in k.6 (11 Sep 2018 04:23)    General:  Appears stated age, well-groomed, nad  HEENT:  NC/AT,  conjunctivae clear and pink, no thyromegaly, nodules, adenopathy, no JVD  Chest:  Full & symmetric excursion, no increased effort, breath sounds clear  Cardiovascular:  Regular rhythm, S1, S2, no murmur/rub/S3/S4, no abdominal bruit, no edema  Abdomen:  Soft, non-tender, non-distended, normoactive bowel sounds,  no masses ,no hepatosplenomeagaly, no signs of chronic liver disease  Extremities:  no cyanosis,clubbing or edema  Skin:  No rash/erythema/ecchymoses/petechiae/wounds/abscess/warm/dry  Neuro/Psych:  A&O  , no asterixis, no tremor, no encephalopathy    Laboratory:                            10.9   8.32  )-----------( 213      ( 11 Sep 2018 07:37 )             32.9     09-11    142  |  106  |  48<H>  ----------------------------<  68<L>  4.3   |  29  |  1.60<H>    Ca    9.0      11 Sep 2018 07:37    TPro  8.2  /  Alb  3.0<L>  /  TBili  1.2  /  DBili  x   /  AST  33  /  ALT  16  /  AlkPhos  100  09-10    LIVER FUNCTIONS - ( 10 Sep 2018 16:34 )  Alb: 3.0 g/dL / Pro: 8.2 g/dL / ALK PHOS: 100 U/L / ALT: 16 U/L / AST: 33 U/L / GGT: x           PT/INR - ( 11 Sep 2018 07:37 )   PT: 48.2 sec;   INR: 4.29 ratio         PTT - ( 10 Sep 2018 16:33 )  PTT:47.0 sec  Urinalysis Basic - ( 10 Sep 2018 20:59 )    Color: Yellow / Appearance: Clear / S.015 / pH: x  Gluc: x / Ketone: Negative  / Bili: Negative / Urobili: Negative   Blood: x / Protein: Negative / Nitrite: Negative   Leuk Esterase: Trace / RBC: Negative /HPF / WBC 3-5   Sq Epi: x / Non Sq Epi: Occasional / Bacteria: Negative        Imaging: Chief Complaint:  Patient is a 85y old  Male who presents with a chief complaint of sob, dyspnea, difficulty walking, dysphagia (11 Sep 2018 10:25)    AF (atrial fibrillation)  GERD (gastroesophageal reflux disease)  CHF (congestive heart failure)  CAD (coronary artery disease)  Glaucoma  HTN (Hypertension)  S/P ICD (internal cardiac defibrillator) procedure  H/O excision of Zenker's diverticulum  S/P Cataract Surgery - bilateral  s/p Angioplasty with Stent -   History of Rotator Cuff Surgery Right and Left  S/P Cholecystectomy  S/P TURP (Transurethral Resection of Prostate)  S/P Prostatectomy -   S/P Prostatectomy  S/P Prostatectomy     HPI:  85 years old with history of PAF on coumadin (5mg x5 days and 2.5mg x2 days/week), CAD s/p remote PCI (1 stent), Luu's esophagus, a repaired Zenker's diverticulum, hypertension, reflux, hypercholesterolemia, glaucoma, moderate NICM, HCM s/p biV ICD p/w weakness and dyspnea. He has been complaining of more difficulty swallowing x a couple of days. He cannot swallow water at this point. He reports losing 18lbs since May but admits he has no appetite. He notes that he has gotten more dyspneic recently and can only walk a few feet. Despite using a cane, he reports difficulty walking. Recently he had his aldactone reduced by half because of weakness by Dr. Burgess. His PPM was recently interrogated (2 weeks ago). He has mild lower ext edema that is unchanged. Additionally, he reports that he has a small wound on his 2nd digit of right foot that is being managed by outpatient podiatrist. Denies any   palpitations, PND, orthopnea, near syncope, syncope,  stroke like symptoms, pain with swallowing.     In the ED, the patient's vital signs were T: 98, HR 60, /70, R: 16, SpO2 98% on RA. CBC WNL. PT/INR/PTT 43.1/3.85/47.0. CMP significant for BUN/Cr 52/1.80. Albumin 3.0. CE's significant for Troponin 0.245, CKMB 3.4. Serum BNP 20900. CXR: Stable cardiomegaly with atherosclerotic aorta. Left cardiac pacer reidentified in situ. Hyperinflated lungs. Fibrotic atelectatic changes right mid and lower lung field similar to prior. New small right and small-to-moderate left pleural effusion. Suture anchors project over the bilateral shoulders. EKG: . Dr. Linda evaluated patient in ED. UA pending. (10 Sep 2018 19:01)      gi consulted for dysphagia. chart reviewed. pt seen and examined. pt reports hx of repair of zenkers diverticulm abour 10 yrs ago. states since then he has had no difficulty swallowing, just occasional reflux. states over the past week he has had dysphagia, unable to swallow liquids at all, able to swallow some soft foods. c/o associated reflux and weight loss of 60#/past year. denies regurgitation of food,  f/c/n/v/d/c/abd pain. last egd/colon done remotely. surgeries as above. denies toxic habits. on coumadin at home. does not follow with gi on the outside.    recent vs/labs/imaging reviewed- afebrile  no leukocytosis  hgb trend noted  inr 3.85  no abd imaging  mbs-    No Known Allergies      ALBUTerol    0.083% 2.5 milliGRAM(s) Nebulizer every 6 hours PRN  digoxin  Injectable 0.25 milliGRAM(s) IV Push daily  influenza   Vaccine 0.5 milliLiter(s) IntraMuscular once  metoprolol tartrate Injectable 5 milliGRAM(s) IV Push every 6 hours  timolol 0.5% Solution 1 Drop(s) Both EYES two times a day        FAMILY HISTORY:  No pertinent family history in first degree relatives        Review of Systems:    General:  wt loss  Eyes:  Good vision, no reported pain  ENT:  No sore throat, pain, runny nose, dysphagia  CV:  No pain, palpitations, no lightheadedness  Resp:  No dyspnea, cough, tachypnea, wheezing  GI: see above  :  No pain, bleeding, incontinence, nocturia  Muscle:  No pain, weakness  Neuro:  No weakness, tingling, memory problems  Psych:  No fatigue, insomnia, mood problems, depression  Endocrine:  No polyuria, polydypsia, cold/heat intolerance  Heme:  No petechiae, ecchymosis, easy bruisability  Skin:  No rash, tattoos, scars, edema    Relevant Family History:   n/c    Relevant Social History: n/c      Physical Exam:    Vital Signs:  Vital Signs Last 24 Hrs  T(C): 36.4 (11 Sep 2018 08:17), Max: 36.9 (10 Sep 2018 21:39)  T(F): 97.5 (11 Sep 2018 08:17), Max: 98.5 (10 Sep 2018 21:39)  HR: 70 (11 Sep 2018 08:17) (60 - 72)  BP: 99/64 (11 Sep 2018 08:17) (90/60 - 105/70)  BP(mean): --  RR: 17 (11 Sep 2018 08:17) (16 - 18)  SpO2: 97% (11 Sep 2018 08:17) (97% - 100%)  Daily Height in cm: 190.5 (10 Sep 2018 14:18)    Daily Weight in k.6 (11 Sep 2018 04:23)    General:  oob in chair in nad frail/elderly appearing  HEENT:  NC/AT, perrl  Chest:  dec bs  Cardiovascular:  Regular rhythm, S1, S2  Abdomen:  Soft, non-tender, non-distended, scar to abd noted  Extremities:  no  edema  Skin:  chronic skin changes  Neuro/Psych:  awake alert responds appropriately    Laboratory:                            10.9   8.32  )-----------( 213      ( 11 Sep 2018 07:37 )             32.9     09-11    142  |  106  |  48<H>  ----------------------------<  68<L>  4.3   |  29  |  1.60<H>    Ca    9.0      11 Sep 2018 07:37    TPro  8.2  /  Alb  3.0<L>  /  TBili  1.2  /  DBili  x   /  AST  33  /  ALT  16  /  AlkPhos  100  09-10    LIVER FUNCTIONS - ( 10 Sep 2018 16:34 )  Alb: 3.0 g/dL / Pro: 8.2 g/dL / ALK PHOS: 100 U/L / ALT: 16 U/L / AST: 33 U/L / GGT: x           PT/INR - ( 11 Sep 2018 07:37 )   PT: 48.2 sec;   INR: 4.29 ratio         PTT - ( 10 Sep 2018 16:33 )  PTT:47.0 sec  Urinalysis Basic - ( 10 Sep 2018 20:59 )    Color: Yellow / Appearance: Clear / S.015 / pH: x  Gluc: x / Ketone: Negative  / Bili: Negative / Urobili: Negative   Blood: x / Protein: Negative / Nitrite: Negative   Leuk Esterase: Trace / RBC: Negative /HPF / WBC 3-5   Sq Epi: x / Non Sq Epi: Occasional / Bacteria: Negative        Imaging:

## 2018-09-11 NOTE — PROGRESS NOTE ADULT - SUBJECTIVE AND OBJECTIVE BOX
HPI:  85 years old with history of PAF on coumadin (5mg x5 days and 2.5mg x2 days/week), CAD s/p remote PCI (1 stent), Luu's esophagus, a repaired Zenker's diverticulum, hypertension, reflux, hypercholesterolemia, glaucoma, moderate NICM, HCM s/p biV ICD p/w weakness and dyspnea. He has been complaining of more difficulty swallowing x a couple of days. He cannot swallow water at this point. He reports losing 18lbs since May but admits he has no appetite. He notes that he has gotten more dyspneic recently and can only walk a few feet. Despite using a cane, he reports difficulty walking. Recently he had his aldactone reduced by half because of weakness by Dr. Burgess. His PPM was recently interrogated (2 weeks ago). He has mild lower ext edema that is unchanged. Additionally, he reports that he has a small wound on his 2nd digit of right foot that is being managed by outpatient podiatrist. Denies any   palpitations, PND, orthopnea, near syncope, syncope,  stroke like symptoms, pain with swallowing.     INTERVAL EVENTS:   Patient seen and examined. Wife at bedside. MBSS this AM showed significant aspiration. Patient has obvious difficulty with handling his secretions. Continues to cough. Afebrile.     REVIEW OF SYSTEMS:    CONSTITUTIONAL: No weakness, fevers or chills  EYES/ENT: No visual changes, no throat pain   RESPIRATORY: + cough, wheezing, hemoptysis; No shortness of breath  CARDIOVASCULAR: No chest pain or palpitations  GASTROINTESTINAL: No abdominal pain, nausea, vomiting, or hematemesis; No diarrhea or constipation. No melena or hematochezia.  GENITOURINARY: No dysuria, frequency or hematuria  NEUROLOGICAL: No dizziness, numbness, or weakness  SKIN: No itching, burning, rashes, or lesions   All other review of systems is negative unless indicated above.    VITAL SIGNS:  Vital Signs Last 24 Hrs  T(C): 36.3 (09-11-18 @ 12:14), Max: 36.9 (09-10-18 @ 21:39)  T(F): 97.4 (09-11-18 @ 12:14), Max: 98.5 (09-10-18 @ 21:39)  HR: 70 (09-11-18 @ 12:14) (60 - 72)  BP: 93/56 (09-11-18 @ 12:14) (90/60 - 105/70)  BP(mean): --  RR: 17 (09-11-18 @ 12:14) (16 - 18)  SpO2: 100% (09-11-18 @ 12:14) (97% - 100%)      PHYSICAL EXAM:     GENERAL: no acute distress  HEENT: NC/AT, EOMI, neck supple, MMM, large wound with eschar on scalp.  RESPIRATORY: good air entry bilaterally, upper airway transmitted noise, no wheeze, decreased BS at bases.  CARDIOVASCULAR: irregular, no murmurs, gallops, rubs  ABDOMINAL: soft, non-tender, non-distended, positive bowel sounds   EXTREMITIES: no clubbing, cyanosis, or edema  NEUROLOGICAL: alert and oriented x 3, non-focal  SKIN: no rashes or lesions   MUSCULOSKELETAL: no gross joint deformity                          10.9   8.32  )-----------( 213      ( 11 Sep 2018 07:37 )             32.9     09-11    142  |  106  |  48<H>  ----------------------------<  68<L>  4.3   |  29  |  1.60<H>    Ca    9.0      11 Sep 2018 07:37    TPro  8.2  /  Alb  3.0<L>  /  TBili  1.2  /  DBili  x   /  AST  33  /  ALT  16  /  AlkPhos  100  09-10    PT/INR - ( 11 Sep 2018 07:37 )   PT: 48.2 sec;   INR: 4.29 ratio         PTT - ( 10 Sep 2018 16:33 )  PTT:47.0 sec      MEDICATIONS  (STANDING):  digoxin  Injectable 0.25 milliGRAM(s) IV Push daily  influenza   Vaccine 0.5 milliLiter(s) IntraMuscular once  metoprolol tartrate Injectable 5 milliGRAM(s) IV Push every 6 hours  pantoprazole  Injectable 40 milliGRAM(s) IV Push daily  timolol 0.5% Solution 1 Drop(s) Both EYES two times a day    MEDICATIONS  (PRN):  ALBUTerol    0.083% 2.5 milliGRAM(s) Nebulizer every 6 hours PRN Shortness of Breath and/or Wheezing

## 2018-09-11 NOTE — GOALS OF CARE CONVERSATION - PERSONAL ADVANCE DIRECTIVE - NS PRO AD PATIENT TYPE
Health Care Proxy (HCP) Do Not Resuscitate (DNR)/Medical Orders for Life-Sustaining Treatment (MOLST)/Health Care Proxy (HCP)

## 2018-09-11 NOTE — PHYSICAL THERAPY INITIAL EVALUATION ADULT - PERTINENT HX OF CURRENT PROBLEM, REHAB EVAL
85 years old with history of PAF on coumadin (5mg x5 days and 2.5mg x2 days/week), CAD s/p remote PCI (1 stent), Luu's esophagus, a repaired Zenker's diverticulum, hypertension, reflux, hypercholesterolemia, glaucoma, moderate NICM, HCM s/p biV ICD p/w weakness and dyspnea. He has been complaining of more difficulty swallowing x a couple of days. He cannot swallow water at this point. He reports losing 18lbs since May but admits he has no appetite.

## 2018-09-11 NOTE — SWALLOW BEDSIDE ASSESSMENT ADULT - PHARYNGEAL PHASE
Wet vocal quality post oral intake/Multiple swallows/Throat clear post oral intake/Delayed cough post oral intake/Within functional limits

## 2018-09-11 NOTE — DIETITIAN INITIAL EVALUATION ADULT. - OTHER INFO
84 y/o male adm with elevated troponin, PRADO, dysphagia. Pt s/p MBS (failed with liquids and solids). H/o zenker's; had surgery in the past. GI consult ordered. ?candidate for another surgery/vs PEG. Pt with chronic severe malnutrition secondary to 28% wt loss over 1 1/2 yrs, consuming <50% of meals. Wasting noted to orbital, temporal, clavicle, tricep and ribs.

## 2018-09-11 NOTE — GOALS OF CARE CONVERSATION - PERSONAL ADVANCE DIRECTIVE - NS PRO AD PATIENT TYPE ON CHART
Health Care Proxy (HCP) Do Not Resuscitate (DNR)/Medical Orders for Life-Sustaining Treatment (MOLST)/Health Care Proxy (HCP)/molst completed 9/17/18

## 2018-09-11 NOTE — SWALLOW VFSS/MBS ASSESSMENT ADULT - DIAGNOSTIC IMPRESSIONS
85 years old admitted with dysphagia, odynophagia c history of PAF on coumadin, CAD s/p remote PCI , Luu's esophagus, a repaired Zenker's diverticulum over 10 years ago, hypertension, reflux, hypercholesterolemia, glaucoma, weakness and dyspnea.  Pt seated at 90 angle for presentations of regular, soft, chopped, puree and thin/ thickened liquids Pt  c adequate labial strippage of bolus off spoon, slow mastication, c delayed hyolaryngeal elevation resulting in premature spillage of bolus to the level of the valleculae c moderate to severe pooling/stasis c moderate penetration c aspiration c all textures. questionable zenker's diverticulum observed/ shoulder obstruction   Pt at high choking and aspiration risk c the most conservative po textures

## 2018-09-11 NOTE — SWALLOW VFSS/MBS ASSESSMENT ADULT - ROSENBEK'S PENETRATION ASPIRATION SCALE
(7) contrast passes glottis, visible subglottic residue remains despite patient’s response (aspiration)

## 2018-09-11 NOTE — DIETITIAN INITIAL EVALUATION ADULT. - PROBLEM SELECTOR PLAN 3
TIBURCIO, likely 2/2 dehydration and poor PO intake, ?ckd3 will need baseline suggest primary team to reach out to pmd  - Continue with gentle hydration with IVF at 75cc/hr  - monitor BMP

## 2018-09-11 NOTE — SWALLOW BEDSIDE ASSESSMENT ADULT - SWALLOW EVAL: RECOMMENDED DIET
Pending [facility] study today continue NPO for now, Pending MBS study today

## 2018-09-11 NOTE — PHYSICAL THERAPY INITIAL EVALUATION ADULT - GAIT TRAINING, PT EVAL
STG (3-5 sessions) Amb 200' w/ RW independet/ up/down stairs independent STG (3-5 sessions) Amb 200' w/ RW independent/ up/down stairs independent

## 2018-09-11 NOTE — CONSULT NOTE ADULT - ASSESSMENT
85 years old with history of PAF on coumadin (5mg x5 days and 2.5mg x2 days/week), CAD s/p remote PCI (1 stent), Luu's esophagus, a repaired Zenker's diverticulum, hypertension, reflux, hypercholesterolemia, glaucoma, moderate NICM, HCM s/p biV ICD p/w weakness and dyspnea. He has been complaining of more difficulty swallowing x a couple of days. gi consulted for dysphagia.

## 2018-09-12 ENCOUNTER — TRANSCRIPTION ENCOUNTER (OUTPATIENT)
Age: 83
End: 2018-09-12

## 2018-09-12 ENCOUNTER — APPOINTMENT (OUTPATIENT)
Dept: CARDIOLOGY | Facility: CLINIC | Age: 83
End: 2018-09-12

## 2018-09-12 LAB
ANION GAP SERPL CALC-SCNC: 10 MMOL/L — SIGNIFICANT CHANGE UP (ref 5–17)
BUN SERPL-MCNC: 55 MG/DL — HIGH (ref 7–23)
CALCIUM SERPL-MCNC: 9.2 MG/DL — SIGNIFICANT CHANGE UP (ref 8.5–10.1)
CHLORIDE SERPL-SCNC: 107 MMOL/L — SIGNIFICANT CHANGE UP (ref 96–108)
CO2 SERPL-SCNC: 26 MMOL/L — SIGNIFICANT CHANGE UP (ref 22–31)
CREAT SERPL-MCNC: 1.6 MG/DL — HIGH (ref 0.5–1.3)
GLUCOSE SERPL-MCNC: 50 MG/DL — LOW (ref 70–99)
HCT VFR BLD CALC: 35.5 % — LOW (ref 39–50)
HGB BLD-MCNC: 11.8 G/DL — LOW (ref 13–17)
INR BLD: 4.34 RATIO — HIGH (ref 0.88–1.16)
MCHC RBC-ENTMCNC: 32.7 PG — SIGNIFICANT CHANGE UP (ref 27–34)
MCHC RBC-ENTMCNC: 33.2 GM/DL — SIGNIFICANT CHANGE UP (ref 32–36)
MCV RBC AUTO: 98.3 FL — SIGNIFICANT CHANGE UP (ref 80–100)
NRBC # BLD: 0 /100 WBCS — SIGNIFICANT CHANGE UP (ref 0–0)
PLATELET # BLD AUTO: 250 K/UL — SIGNIFICANT CHANGE UP (ref 150–400)
POTASSIUM SERPL-MCNC: 4.8 MMOL/L — SIGNIFICANT CHANGE UP (ref 3.5–5.3)
POTASSIUM SERPL-SCNC: 4.8 MMOL/L — SIGNIFICANT CHANGE UP (ref 3.5–5.3)
PROTHROM AB SERPL-ACNC: 48.8 SEC — HIGH (ref 9.8–12.7)
RBC # BLD: 3.61 M/UL — LOW (ref 4.2–5.8)
RBC # FLD: 14.7 % — HIGH (ref 10.3–14.5)
SODIUM SERPL-SCNC: 143 MMOL/L — SIGNIFICANT CHANGE UP (ref 135–145)
WBC # BLD: 12.23 K/UL — HIGH (ref 3.8–10.5)
WBC # FLD AUTO: 12.23 K/UL — HIGH (ref 3.8–10.5)

## 2018-09-12 PROCEDURE — 99232 SBSQ HOSP IP/OBS MODERATE 35: CPT

## 2018-09-12 PROCEDURE — 99233 SBSQ HOSP IP/OBS HIGH 50: CPT

## 2018-09-12 RX ORDER — PHYTONADIONE (VIT K1) 5 MG
5 TABLET ORAL ONCE
Qty: 0 | Refills: 0 | Status: COMPLETED | OUTPATIENT
Start: 2018-09-12 | End: 2018-09-12

## 2018-09-12 RX ORDER — PHYTONADIONE (VIT K1) 5 MG
2.5 TABLET ORAL ONCE
Qty: 0 | Refills: 0 | Status: DISCONTINUED | OUTPATIENT
Start: 2018-09-12 | End: 2018-09-12

## 2018-09-12 RX ORDER — ALBUTEROL 90 UG/1
2.5 AEROSOL, METERED ORAL EVERY 6 HOURS
Qty: 0 | Refills: 0 | Status: DISCONTINUED | OUTPATIENT
Start: 2018-09-12 | End: 2018-09-20

## 2018-09-12 RX ADMIN — Medication 101 MILLIGRAM(S): at 18:30

## 2018-09-12 RX ADMIN — Medication 1 DROP(S): at 05:37

## 2018-09-12 RX ADMIN — Medication 0.25 MILLIGRAM(S): at 13:23

## 2018-09-12 RX ADMIN — Medication 1 DROP(S): at 17:24

## 2018-09-12 RX ADMIN — PANTOPRAZOLE SODIUM 40 MILLIGRAM(S): 20 TABLET, DELAYED RELEASE ORAL at 13:24

## 2018-09-12 RX ADMIN — ALBUTEROL 2.5 MILLIGRAM(S): 90 AEROSOL, METERED ORAL at 19:44

## 2018-09-12 NOTE — CHART NOTE - NSCHARTNOTEFT_GEN_A_CORE
Assessment: 86 y/o male adm with elevated troponis, PRADO, dysphagia. Failed MBS. Spoke with pt and Dr. Genao. Pt to have NGT placed for TF. Jevity 1.2 @ 70cc/hr x 24 hrs rx. Pt then to have EGD with PEG. To build pt up nutritionally to ultimately have surgery for Zenker's after d/c.     Factors impacting intake: [ ] none [ ] nausea  [ ] vomiting [ ] diarrhea [ ] constipation  [ ]chewing problems [x ] swallowing issues  [ ] other:     Diet Presciption: Diet, NPO (09-10-18 @ 19:21)    Intake: pt NPO    Current Weight: Weight (kg): 68 (09-10 @ 14:18)  % Weight Change    Pertinent Medications: MEDICATIONS  (STANDING):  ALBUTerol    0.083% 2.5 milliGRAM(s) Nebulizer every 6 hours  digoxin  Injectable 0.25 milliGRAM(s) IV Push daily  guaiFENesin  milliGRAM(s) Oral every 12 hours  influenza   Vaccine 0.5 milliLiter(s) IntraMuscular once  metoprolol tartrate Injectable 5 milliGRAM(s) IV Push every 6 hours  pantoprazole  Injectable 40 milliGRAM(s) IV Push daily  timolol 0.5% Solution 1 Drop(s) Both EYES two times a day    MEDICATIONS  (PRN):    Pertinent Labs: 09-11 Na142 mmol/L Glu 68 mg/dL<L> K+ 4.3 mmol/L Cr  1.60 mg/dL<H> BUN 48 mg/dL<H> 09-10 Alb 3.0 g/dL<L>     CAPILLARY BLOOD GLUCOSE        Skin: no pressure ulcers noted.     Estimated Needs:   [x ] no change since previous assessment  [ ] recalculated:     Previous Nutrition Diagnosis:   [ ] Inadequate Energy Intake [ ]Inadequate Oral Intake [ ] Excessive Energy Intake   [ ] Underweight [ ] Increased Nutrient Needs [ ] Overweight/Obesity   [ ] Altered GI Function [ ] Unintended Weight Loss [ ] Food & Nutrition Related Knowledge Deficit [x ] Malnutrition     Nutrition Diagnosis is [x ] ongoing  [ ] resolved [ ] not applicable     New Nutrition Diagnosis: [ ] not applicable       Interventions:   Recommend  [ ] Change Diet To:  [ ] Nutrition Supplement  [ x] Nutrition Support: Jevity 1.2 @ 70cc/hr x 24 hrs.   [ ] Other:     Monitoring and Evaluation:   [ ] PO intake [ x ] Tolerance to diet prescription [ x ] weights [ x ] labs[ x ] follow up per protocol  [ ] other:

## 2018-09-12 NOTE — PROGRESS NOTE ADULT - ASSESSMENT
84 M of PAF AC, CAD s/p remote PCI Luu's esophagus, a repaired Zenker's diverticulum, hypertension, reflux, hypercholesterolemia, glaucoma, moderate NICM, HCM s/p biV ICD p/w dyspnea.    - CT scan not consistent with severe volume overload  - May hold his aldactone  - Please continue to maintain strict I/Os, monitor daily weights, Cr, and K.  - Ned are elevated but likely from demand ischemia.  Would trend  - Cont plavix and statin  - Cont Digoxin digoxin level 1.0   - Continue to hole coumadin INR 4.19 Hold AC as INR supra-therapeutic   - Trend CBC as he is at risk for acute blood loss   - Pulm follow up   - Failed barium swallow test   -Patient is scheduled for NG tube insertion today  - EGD being considered.  Would hold off until we have a better idea what the etiology of his hypoxia is  - Further cardiac workup will depend on clinical course.   - All other workup per primary team. Will followup.     Agustin Beckman, MSN, FNP 84 M of PAF AC, CAD s/p remote PCI Luu's esophagus, a repaired Zenker's diverticulum, hypertension, reflux, hypercholesterolemia, glaucoma, moderate NICM, HCM s/p biV ICD p/w dyspnea.    - CT scan not consistent with severe volume overload; small pericardial and pleural effusions are noted.  - Likely some component of aspiration  - Please continue to maintain strict I/Os, monitor daily weights, Cr, and K.  - Ned are elevated but likely from demand ischemia  - Cont plavix and statin  - Cont Digoxin, digoxin level 1.0   - Continue to hole coumadin INR 4.29 Hold AC as INR supra-therapeutic   - Trend CBC as he is at risk for acute blood loss   - Pulm follow up  - Failed barium swallow test   -Patient is scheduled for NG tube insertion today  - EGD being considered at this time, and I think his breathing has significantly improved.  - Further cardiac workup will depend on clinical course.   - All other workup per primary team. Will followup.     Agustin Beckman, MSN, FNP 84 M of PAF AC, CAD s/p remote PCI Luu's esophagus, a repaired Zenker's diverticulum, hypertension, reflux, hypercholesterolemia, glaucoma, moderate NICM, HCM s/p biV ICD p/w dyspnea.    - CT scan not consistent with severe volume overload; small pericardial and pleural effusions are noted.  - Likely some component of aspiration  - Please continue to maintain strict I/Os, monitor daily weights, Cr, and K.  - Ned are elevated but likely from demand ischemia  - Cont plavix and statin  - Cont Digoxin, digoxin level 1.0   - Continue to hole coumadin INR 4.29 Hold AC as INR supra-therapeutic   - Trend CBC as he is at risk for acute blood loss   - Pulm follow up  - Failed barium swallow test   -Patient is scheduled for NG tube insertion today  - EGD being considered at this time, and I think his breathing has significantly improved to the extent of him tolerating an EGD.  - Further cardiac workup will depend on clinical course.   - All other workup per primary team. Will followup.     Agustin Beckman, MSN, FNP

## 2018-09-12 NOTE — PROGRESS NOTE ADULT - PROBLEM SELECTOR PLAN 7
HTN, chronic.   - On home Aldactone- will hold as per cardio recs  - On home Metoprolol succinate 25mg BID. Will continue IV lopressor 5mg q6hrs with hold parameters  - monitor vital signs

## 2018-09-12 NOTE — PROGRESS NOTE ADULT - SUBJECTIVE AND OBJECTIVE BOX
INTERVAL HPI/OVERNIGHT EVENTS:  pt seen and examined  denies n/v/d/c/abd pain, +bm yesterday  no acute gi issues per overnight rn  labs pending afebrile overnight    MEDICATIONS  (STANDING):  digoxin  Injectable 0.25 milliGRAM(s) IV Push daily  influenza   Vaccine 0.5 milliLiter(s) IntraMuscular once  metoprolol tartrate Injectable 5 milliGRAM(s) IV Push every 6 hours  pantoprazole  Injectable 40 milliGRAM(s) IV Push daily  timolol 0.5% Solution 1 Drop(s) Both EYES two times a day    MEDICATIONS  (PRN):  ALBUTerol    0.083% 2.5 milliGRAM(s) Nebulizer every 6 hours PRN Shortness of Breath and/or Wheezing      Allergies    No Known Allergies    Intolerances        Review of Systems:    General:  No wt loss, fevers, chills, night sweats, fatigue   Eyes:  Good vision, no reported pain  ENT:  No sore throat, pain, runny nose, dysphagia  CV:  No pain, palpitations, hypo/hypertension  Resp: +cough  GI:  No pain, No nausea, No vomiting, No diarrhea, No constipation, No weight loss, No fever, No pruritis, No rectal bleeding, No melena,   +dysphagia  :  No pain, bleeding, incontinence, nocturia  Muscle:  No pain, weakness  Neuro:  No weakness, tingling, memory problems  Psych:  No fatigue, insomnia, mood problems, depression  Endocrine:  No polyuria, polydypsia, cold/heat intolerance  Heme:  No petechiae, ecchymosis, easy bruisability  Skin:  No rash, tattoos, scars, edema      Vital Signs Last 24 Hrs  T(C): 36.4 (12 Sep 2018 07:25), Max: 36.7 (11 Sep 2018 19:42)  T(F): 97.6 (12 Sep 2018 07:25), Max: 98 (11 Sep 2018 19:42)  HR: 71 (12 Sep 2018 07:25) (70 - 73)  BP: 108/71 (12 Sep 2018 07:25) (93/56 - 108/71)  BP(mean): --  RR: 1 (12 Sep 2018 07:25) (1 - 18)  SpO2: 92% (12 Sep 2018 07:25) (92% - 100%)    PHYSICAL EXAM:    General:  lying in bed in nad frail/elderly appearing  HEENT:  NC/AT, perrl, scalp lesion  Chest:  coarse bs  Cardiovascular:  Regular rhythm, S1, S2  Abdomen:  Soft, non-tender, non-distended, scar to abd noted  Extremities:  no  edema  Skin:  chronic skin changes  Neuro/Psych:  awake alert responds appropriately      LABS:                        10.9   8.32  )-----------( 213      ( 11 Sep 2018 07:37 )             32.9     09-11    142  |  106  |  48<H>  ----------------------------<  68<L>  4.3   |  29  |  1.60<H>    Ca    9.0      11 Sep 2018 07:37    TPro  8.2  /  Alb  3.0<L>  /  TBili  1.2  /  DBili  x   /  AST  33  /  ALT  16  /  AlkPhos  100  09-10    PT/INR - ( 11 Sep 2018 07:37 )   PT: 48.2 sec;   INR: 4.29 ratio         PTT - ( 10 Sep 2018 16:33 )  PTT:47.0 sec  Urinalysis Basic - ( 10 Sep 2018 20:59 )    Color: Yellow / Appearance: Clear / S.015 / pH: x  Gluc: x / Ketone: Negative  / Bili: Negative / Urobili: Negative   Blood: x / Protein: Negative / Nitrite: Negative   Leuk Esterase: Trace / RBC: Negative /HPF / WBC 3-5   Sq Epi: x / Non Sq Epi: Occasional / Bacteria: Negative        RADIOLOGY & ADDITIONAL TESTS:

## 2018-09-12 NOTE — CONSULT NOTE ADULT - PROBLEM SELECTOR RECOMMENDATION 9
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

## 2018-09-12 NOTE — PROGRESS NOTE ADULT - SUBJECTIVE AND OBJECTIVE BOX
[facility] -- Cory Fragoso, Niurka, Aditya, Ermelinda, Davin Linda  Office # [phone]      Follow Up:  HPI:  85 years old with history of PAF on coumadin (5mg x5 days and 2.5mg x2 days/week), CAD s/p remote PCI (1 stent), Luu's esophagus, a repaired Zenker's diverticulum, hypertension, reflux, hypercholesterolemia, glaucoma, moderate NICM, HCM s/p biV ICD p/w weakness and dyspnea. He has been complaining of more difficulty swallowing x a couple of days. He cannot swallow water at this point. He reports losing 18lbs since May but admits he has no appetite. He notes that he has gotten more dyspneic recently and can only walk a few feet. Despite using a cane, he reports difficulty walking. Recently he had his aldactone reduced by half because of weakness by Dr. Burgess. His PPM was recently interrogated (2 weeks ago). He has mild lower ext edema that is unchanged. Additionally, he reports that he has a small wound on his 2nd digit of right foot that is being managed by outpatient podiatrist. Denies any   palpitations, PND, orthopnea, near syncope, syncope,  stroke like symptoms, pain with swallowing.     In the ED, the patient's vital signs were T: 98, HR 60, /70, R: 16, SpO2 98% on RA. CBC WNL. PT/INR/PTT 43.1/3.85/47.0. CMP significant for BUN/Cr 52/1.80. Albumin 3.0. CE's significant for Troponin 0.245, CKMB 3.4. Serum BNP 75805. CXR: Stable cardiomegaly with atherosclerotic aorta. Left cardiac pacer reidentified in situ. Hyperinflated lungs. Fibrotic atelectatic changes right mid and lower lung field similar to prior. New small right and small-to-moderate left pleural effusion. Suture anchors project over the bilateral shoulders. EKG: . Dr. Linda evaluated patient in ED. UA pending. (10 Sep 2018 19:01)        Subjective/Observations: Pt. seen and examined and evaluated. Pt. resting comfortably in bed in NAD, with no respiratory distress, no chest pain, dyspnea, palpitations, PND, or orthopnea.        REVIEW OF SYSTEMS: All other review of systems is negative unless indicated above    PAST MEDICAL & SURGICAL HISTORY:  AF (atrial fibrillation): Cardioversion 5/2012  GERD (gastroesophageal reflux disease)  CHF (congestive heart failure)  CAD (coronary artery disease)  Glaucoma  HTN (Hypertension)  S/P ICD (internal cardiac defibrillator) procedure  H/O excision of Zenker's diverticulum  S/P Cataract Surgery - bilateral  s/p Angioplasty with Stent - 2010  History of Rotator Cuff Surgery Right and Left  S/P Cholecystectomy  S/P TURP (Transurethral Resection of Prostate)  S/P Prostatectomy - 2005      MEDICATIONS  (STANDING):  ALBUTerol    0.083% 2.5 milliGRAM(s) Nebulizer every 6 hours  digoxin  Injectable 0.25 milliGRAM(s) IV Push daily  guaiFENesin  milliGRAM(s) Oral every 12 hours  influenza   Vaccine 0.5 milliLiter(s) IntraMuscular once  metoprolol tartrate Injectable 5 milliGRAM(s) IV Push every 6 hours  pantoprazole  Injectable 40 milliGRAM(s) IV Push daily  timolol 0.5% Solution 1 Drop(s) Both EYES two times a day    MEDICATIONS  (PRN):      Allergies    No Known Allergies    Intolerances      Vital Signs Last 24 Hrs  T(C): 36.3 (12 Sep 2018 11:11), Max: 36.7 (11 Sep 2018 19:42)  T(F): 97.4 (12 Sep 2018 11:11), Max: 98 (11 Sep 2018 19:42)  HR: 70 (12 Sep 2018 11:11) (70 - 73)  BP: 94/67 (12 Sep 2018 11:11) (93/59 - 108/71)  BP(mean): --  RR: 17 (12 Sep 2018 11:11) (17 - 18)  SpO2: 99% (12 Sep 2018 11:11) (92% - 100%)    I&O's Summary        PHYSICAL EXAM:  TELE: V Paced @ 70's  Constitutional: NAD, awake and alert, well-developed  HEENT: Moist Mucous Membranes, Anicteric  Pulmonary: Non-labored, breath sounds are clear bilaterally, No wheezing, rales or rhonchi  Cardiovascular: Regular, S1 and S2, No murmurs, rubs, gallops or clicks  Gastrointestinal: Bowel Sounds present, soft, nontender.   Lymph: No peripheral edema. No lymphadenopathy.  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  Psych:  Mood & affect appropriate    LABS: All Labs Reviewed:                        10.9   8.32  )-----------( 213      ( 11 Sep 2018 07:37 )             32.9                         12.0   9.91  )-----------( 249      ( 10 Sep 2018 16:34 )             35.5     11 Sep 2018 07:37    142    |  106    |  48     ----------------------------<  68     4.3     |  29     |  1.60   10 Sep 2018 16:34    140    |  105    |  52     ----------------------------<  82     4.6     |  26     |  1.80     Ca    9.0        11 Sep 2018 07:37  Ca    9.0        10 Sep 2018 16:34    TPro  8.2    /  Alb  3.0    /  TBili  1.2    /  DBili  x      /  AST  33     /  ALT  16     /  AlkPhos  100    10 Sep 2018 16:34    PT/INR - ( 11 Sep 2018 07:37 )   PT: 48.2 sec;   INR: 4.29 ratio         PTT - ( 10 Sep 2018 16:33 )  PTT:47.0 sec  CARDIAC MARKERS ( 11 Sep 2018 07:37 )  .247 ng/mL / x     / 56 U/L / x     / 4.0 ng/mL  CARDIAC MARKERS ( 10 Sep 2018 23:32 )  .238 ng/mL / x     / 49 U/L / x     / 3.6 ng/mL  CARDIAC MARKERS ( 10 Sep 2018 16:34 )  .245 ng/mL / x     / x     / x     / 3.4 ng/mL          < from: CT Chest No Cont (09.11.18 @ 07:48) >  CHEST:     CHEST WALL AND LOWER NECK: Within normal limits  MEDIASTINUM AND DARINEL: Within normal limits  HEART: Cardiomegaly, pacemaker. Small pericardial effusion. Extensive   coronary calcifications. This workstation  VESSELS: Extensive calcifications.  LUNG AND LARGE AIRWAYS: Left lower lobe endobronchial mucoid impaction.   Nodular and tree-in-bud opacities left lower lobe, new from 2011. Small   pleural effusions, improved. Evidence of prior wedge resection right   lung. Bilateral segmental atelectasis.  VISUALIZED UPPER ABDOMEN: Within normal limits.  BONES: Spinal ankylosis.    IMPRESSION: Left lower lobe endobronchial mucoid impaction with   underlying airspace opacities, probably pneumonia, correlate for   aspiration. Follow-up to resolution.    Small pleural effusions. Small pericardial effusion.      < end of copied text >  < from: Xray Chest 1 View- PORTABLE-Urgent (09.10.18 @ 16:14) >  AP chest. Prior 5/17/2015.    Stable cardiomegaly with atherosclerotic aorta. Left cardiac pacer   reidentified in situ. Hyperinflated lungs. Fibrotic atelectatic changes   right mid and lower lung field similar to prior. New small right and   small-to-moderate left pleural effusion. Suture anchors project over the   bilateral shoulders.    Impression: As above    < end of copied text >   < from: 12 Lead ECG (09.10.18 @ 17:39) >  Diagnosis Line Ventricular-paced rhythm    < end of copied text >

## 2018-09-12 NOTE — PROGRESS NOTE ADULT - PROBLEM SELECTOR PLAN 1
hx of zenkers, sp remote repair  ct chest reviewed  seen by speech, mbs done, ?zenkers, recs for npo  ivf as needed  aspiration precautions, elevate hob  ppi iv qd  agreeable to egd for further eval, will plan for procedure when medically optimized and cleared by pulmonology and cardiology, will need copy of recent ppm interrogation  consider ngt feeds in interim, cyril pt, he is agreeable  nutrition recs appreciated  may need surgical eval  further plan of care to be cyril attg  will follow

## 2018-09-12 NOTE — PROGRESS NOTE ADULT - PROBLEM SELECTOR PLAN 3
TIBURCIO, likely 2/2 dehydration and poor PO intake  Hold fluids. To start NG feeds today.  - monitor BMP

## 2018-09-12 NOTE — PROGRESS NOTE ADULT - PROBLEM SELECTOR PLAN 1
dyspnea  cough  pulm congestion  on AC, low suspicion for VTE  diuresis as per Cardio, TTE pending  will cont Albuterol, chest pt, Ct chest reviewed - mucus impaction  increase activity  Mucinex  oral hygiene  monitor vs and HD and Sat  keep sat > 88 pct  will follow

## 2018-09-12 NOTE — PROGRESS NOTE ADULT - SUBJECTIVE AND OBJECTIVE BOX
HPI:  85 years old with history of PAF on coumadin (5mg x5 days and 2.5mg x2 days/week), CAD s/p remote PCI (1 stent), Luu's esophagus, a repaired Zenker's diverticulum, hypertension, reflux, hypercholesterolemia, glaucoma, moderate NICM, HCM s/p biV ICD p/w weakness and dyspnea. He has been complaining of more difficulty swallowing x a couple of days. He cannot swallow water at this point. He reports losing 18lbs since May but admits he has no appetite. He notes that he has gotten more dyspneic recently and can only walk a few feet. Despite using a cane, he reports difficulty walking. Recently he had his aldactone reduced by half because of weakness by Dr. Burgess. His PPM was recently interrogated (2 weeks ago). He has mild lower ext edema that is unchanged. Additionally, he reports that he has a small wound on his 2nd digit of right foot that is being managed by outpatient podiatrist. Denies any   palpitations, PND, orthopnea, near syncope, syncope,  stroke like symptoms, pain with swallowing.     INTERVAL EVENTS:   Patient seen and examined. No overnight events. Feels ok today, but complains of feeling hungry. He denies any chest pain, SOB, palpitations. No N/V/D. Still with cough.     REVIEW OF SYSTEMS:    CONSTITUTIONAL: No weakness, fevers or chills  EYES/ENT: No visual changes, no throat pain   RESPIRATORY: + cough, wheezing, hemoptysis; No shortness of breath  CARDIOVASCULAR: No chest pain or palpitations  GASTROINTESTINAL: No abdominal pain, nausea, vomiting, or hematemesis; No diarrhea or constipation. No melena or hematochezia.  GENITOURINARY: No dysuria, frequency or hematuria  NEUROLOGICAL: No dizziness, numbness, or weakness  SKIN: No itching, burning, rashes, or lesions   All other review of systems is negative unless indicated above.    VITAL SIGNS:  Vital Signs Last 24 Hrs  T(C): 36.3 (12 Sep 2018 11:11), Max: 36.7 (11 Sep 2018 19:42)  T(F): 97.4 (12 Sep 2018 11:11), Max: 98 (11 Sep 2018 19:42)  HR: 70 (12 Sep 2018 11:11) (70 - 73)  BP: 94/67 (12 Sep 2018 11:11) (93/56 - 108/71)  BP(mean): --  RR: 17 (12 Sep 2018 11:11) (17 - 18)  SpO2: 99% (12 Sep 2018 11:11) (92% - 100%)      PHYSICAL EXAM:     GENERAL: no acute distress  HEENT: NC/AT, EOMI, neck supple, MMM, large wound with eschar on scalp.  RESPIRATORY: good air entry bilaterally, upper airway transmitted noise, no wheeze, decreased BS at bases.  CARDIOVASCULAR: irregular, no murmurs, gallops, rubs  ABDOMINAL: soft, non-tender, non-distended, positive bowel sounds   EXTREMITIES: no clubbing, cyanosis, or edema  NEUROLOGICAL: alert and oriented x 3, non-focal  SKIN: no rashes or lesions   MUSCULOSKELETAL: no gross joint deformity    Labs pending      MEDICATIONS  (STANDING):  ALBUTerol    0.083% 2.5 milliGRAM(s) Nebulizer every 6 hours  digoxin  Injectable 0.25 milliGRAM(s) IV Push daily  guaiFENesin  milliGRAM(s) Oral every 12 hours  influenza   Vaccine 0.5 milliLiter(s) IntraMuscular once  metoprolol tartrate Injectable 5 milliGRAM(s) IV Push every 6 hours  pantoprazole  Injectable 40 milliGRAM(s) IV Push daily  timolol 0.5% Solution 1 Drop(s) Both EYES two times a day    MEDICATIONS  (PRN):

## 2018-09-12 NOTE — PROGRESS NOTE ADULT - PROBLEM SELECTOR PLAN 1
FTT, likely 2/2 poor PO intake, dysphagia, age-related changes  - Significant aspiration and possible Zenker's diverticulum recurrence on MBSS.   - PT consult recommending HCPT.  - fall precautions  - safety precautions  - GI to place NG tube for feeds and meds temporarily.  - Seen by cardiothoracic surgery Shailesh, who recommends PEG placement for now and outpatient follow-up with eventual surgical intervention for Zenker's diverticulum.  - GI to discuss PEG placement with patient and wife. If/when they are in agreement, patient may proceed with PEG placement pending cardio clearance. He is at increased risk for procedure based on his advanced age and his multiple comorbidities, but there are no absolute contraindications to proceeding.

## 2018-09-12 NOTE — CONSULT NOTE ADULT - SUBJECTIVE AND OBJECTIVE BOX
HPI:  85 years old with history of PAF on coumadin (5mg x5 days and 2.5mg x2 days/week), CAD s/p remote PCI (1 stent), Luu's esophagus, a repaired Zenker's diverticulum, hypertension, reflux, hypercholesterolemia, glaucoma, moderate NICM, HCM s/p biV ICD p/w weakness and dyspnea. He has been complaining of more difficulty swallowing x a couple of days. He cannot swallow water at this point. He reports losing 18lbs since May but admits he has no appetite. He notes that he has gotten more dyspneic recently and can only walk a few feet. Despite using a cane, he reports difficulty walking. Recently he had his aldactone reduced by half because of weakness by Dr. Burgess. His PPM was recently interrogated (2 weeks ago). He has mild lower ext edema that is unchanged. Additionally, he reports that he has a small wound on his 2nd digit of right foot that is being managed by outpatient podiatrist. Denies any   palpitations, PND, orthopnea, near syncope, syncope,  stroke like symptoms, pain with swallowing.   gi consulted for dysphagia. chart reviewed. pt seen and examined. pt reports hx of repair of zenkers diverticulm abour 10 yrs ago. states since then he has had no difficulty swallowing, just occasional reflux. states over the past week he has had dysphagia, unable to swallow liquids at all, able to swallow some soft foods. c/o associated reflux and weight loss of 60lbs/past year. denies regurgitation of food,  f/c/n/v/d/c/abd pain. last egd/colon done remotely. surgeries as above. denies toxic habits. on coumadin at home. does not follow with gi on the outside.    recent vs/labs/imaging reviewed- afebrile  no leukocytosis  hgb trend noted  inr 3.85  no abd imaging  mbs-    No Known Allergies      ALBUTerol    0.083% 2.5 milliGRAM(s) Nebulizer every 6 hours PRN  digoxin  Injectable 0.25 milliGRAM(s) IV Push daily  influenza   Vaccine 0.5 milliLiter(s) IntraMuscular once  metoprolol tartrate Injectable 5 milliGRAM(s) IV Push every 6 hours  timolol 0.5% Solution 1 Drop(s) Both EYES two times a day        FAMILY HISTORY:  No pertinent family history in first degree relatives        Review of Systems:    General:  wt loss  Eyes:  Good vision, no reported pain  ENT:  No sore throat, pain, runny nose, dysphagia  CV:  No pain, palpitations, no lightheadedness  Resp:  No dyspnea, cough, tachypnea, wheezing  GI: see above  :  No pain, bleeding, incontinence, nocturia  Muscle:  No pain, weakness  Neuro:  No weakness, tingling, memory problems  Psych:  No fatigue, insomnia, mood problems, depression  Endocrine:  No polyuria, polydypsia, cold/heat intolerance  Heme:  No petechiae, ecchymosis, easy bruisability  Skin:  No rash, tattoos, scars, edema    Relevant Family History:   n/c    Relevant Social History: n/c      Physical Exam:      General:  oob in chair in nad frail/elderly appearing  HEENT:  NC/AT, perrl  Chest:  dec bs  Cardiovascular:  Regular rhythm, S1, S2  Abdomen:  Soft, non-tender, non-distended, scar to abd noted

## 2018-09-12 NOTE — PROGRESS NOTE ADULT - SUBJECTIVE AND OBJECTIVE BOX
Date/Time Patient Seen:  		  Referring MD:   Data Reviewed	       Patient is a 85y old  Male who presents with a chief complaint of sob, dyspnea, difficulty walking, dysphagia (12 Sep 2018 08:31)    in bed  seen and examined  cough reported      Subjective/HPI     PAST MEDICAL & SURGICAL HISTORY:  AF (atrial fibrillation): Cardioversion 5/2012  GERD (gastroesophageal reflux disease)  CHF (congestive heart failure)  CAD (coronary artery disease)  Glaucoma  HTN (Hypertension)  S/P ICD (internal cardiac defibrillator) procedure  H/O excision of Zenker's diverticulum  S/P Cataract Surgery - bilateral  s/p Angioplasty with Stent - 2010  History of Rotator Cuff Surgery Right and Left  S/P Cholecystectomy  S/P TURP (Transurethral Resection of Prostate)  S/P Prostatectomy - 2005  S/P Prostatectomy  S/P Prostatectomy        Medication list         MEDICATIONS  (STANDING):  ALBUTerol    0.083% 2.5 milliGRAM(s) Nebulizer every 6 hours  digoxin  Injectable 0.25 milliGRAM(s) IV Push daily  influenza   Vaccine 0.5 milliLiter(s) IntraMuscular once  metoprolol tartrate Injectable 5 milliGRAM(s) IV Push every 6 hours  pantoprazole  Injectable 40 milliGRAM(s) IV Push daily  timolol 0.5% Solution 1 Drop(s) Both EYES two times a day    MEDICATIONS  (PRN):         Vitals log        ICU Vital Signs Last 24 Hrs  T(C): 36.4 (12 Sep 2018 07:25), Max: 36.7 (11 Sep 2018 19:42)  T(F): 97.6 (12 Sep 2018 07:25), Max: 98 (11 Sep 2018 19:42)  HR: 71 (12 Sep 2018 07:25) (70 - 73)  BP: 108/71 (12 Sep 2018 07:25) (93/56 - 108/71)  BP(mean): --  ABP: --  ABP(mean): --  RR: 1 (12 Sep 2018 07:25) (1 - 18)  SpO2: 92% (12 Sep 2018 07:25) (92% - 100%)           Input and Output:  I&O's Detail      Lab Data                        10.9   8.32  )-----------( 213      ( 11 Sep 2018 07:37 )             32.9     09-11    142  |  106  |  48<H>  ----------------------------<  68<L>  4.3   |  29  |  1.60<H>    Ca    9.0      11 Sep 2018 07:37    TPro  8.2  /  Alb  3.0<L>  /  TBili  1.2  /  DBili  x   /  AST  33  /  ALT  16  /  AlkPhos  100  09-10      CARDIAC MARKERS ( 11 Sep 2018 07:37 )  .247 ng/mL / x     / 56 U/L / x     / 4.0 ng/mL  CARDIAC MARKERS ( 10 Sep 2018 23:32 )  .238 ng/mL / x     / 49 U/L / x     / 3.6 ng/mL  CARDIAC MARKERS ( 10 Sep 2018 16:34 )  .245 ng/mL / x     / x     / x     / 3.4 ng/mL        Review of Systems	      Objective     Physical Examination        Pertinent Lab findings & Imaging      Sherly:  NO   Adequate UO     I&O's Detail           Discussed with:     Cultures:	        Radiology

## 2018-09-13 DIAGNOSIS — R09.89 OTHER SPECIFIED SYMPTOMS AND SIGNS INVOLVING THE CIRCULATORY AND RESPIRATORY SYSTEMS: ICD-10-CM

## 2018-09-13 LAB
ANION GAP SERPL CALC-SCNC: 9 MMOL/L — SIGNIFICANT CHANGE UP (ref 5–17)
BUN SERPL-MCNC: 64 MG/DL — HIGH (ref 7–23)
CALCIUM SERPL-MCNC: 9 MG/DL — SIGNIFICANT CHANGE UP (ref 8.5–10.1)
CHLORIDE SERPL-SCNC: 109 MMOL/L — HIGH (ref 96–108)
CO2 SERPL-SCNC: 26 MMOL/L — SIGNIFICANT CHANGE UP (ref 22–31)
CREAT SERPL-MCNC: 1.9 MG/DL — HIGH (ref 0.5–1.3)
GLUCOSE SERPL-MCNC: 49 MG/DL — LOW (ref 70–99)
HCT VFR BLD CALC: 37.5 % — LOW (ref 39–50)
HGB BLD-MCNC: 12.7 G/DL — LOW (ref 13–17)
INR BLD: 1.52 RATIO — HIGH (ref 0.88–1.16)
MCHC RBC-ENTMCNC: 33.2 PG — SIGNIFICANT CHANGE UP (ref 27–34)
MCHC RBC-ENTMCNC: 33.9 GM/DL — SIGNIFICANT CHANGE UP (ref 32–36)
MCV RBC AUTO: 98.2 FL — SIGNIFICANT CHANGE UP (ref 80–100)
NRBC # BLD: 0 /100 WBCS — SIGNIFICANT CHANGE UP (ref 0–0)
PLATELET # BLD AUTO: 260 K/UL — SIGNIFICANT CHANGE UP (ref 150–400)
POTASSIUM SERPL-MCNC: 5.2 MMOL/L — SIGNIFICANT CHANGE UP (ref 3.5–5.3)
POTASSIUM SERPL-SCNC: 5.2 MMOL/L — SIGNIFICANT CHANGE UP (ref 3.5–5.3)
PROTHROM AB SERPL-ACNC: 16.7 SEC — HIGH (ref 9.8–12.7)
RBC # BLD: 3.82 M/UL — LOW (ref 4.2–5.8)
RBC # FLD: 14.8 % — HIGH (ref 10.3–14.5)
SODIUM SERPL-SCNC: 144 MMOL/L — SIGNIFICANT CHANGE UP (ref 135–145)
WBC # BLD: 13.34 K/UL — HIGH (ref 3.8–10.5)
WBC # FLD AUTO: 13.34 K/UL — HIGH (ref 3.8–10.5)

## 2018-09-13 PROCEDURE — 99232 SBSQ HOSP IP/OBS MODERATE 35: CPT

## 2018-09-13 RX ORDER — DEXTROSE 50 % IN WATER 50 %
12.5 SYRINGE (ML) INTRAVENOUS ONCE
Qty: 0 | Refills: 0 | Status: COMPLETED | OUTPATIENT
Start: 2018-09-13 | End: 2018-09-13

## 2018-09-13 RX ORDER — PIPERACILLIN AND TAZOBACTAM 4; .5 G/20ML; G/20ML
3.38 INJECTION, POWDER, LYOPHILIZED, FOR SOLUTION INTRAVENOUS EVERY 12 HOURS
Qty: 0 | Refills: 0 | Status: DISCONTINUED | OUTPATIENT
Start: 2018-09-13 | End: 2018-09-20

## 2018-09-13 RX ORDER — SODIUM CHLORIDE 9 MG/ML
1000 INJECTION, SOLUTION INTRAVENOUS
Qty: 0 | Refills: 0 | Status: DISCONTINUED | OUTPATIENT
Start: 2018-09-13 | End: 2018-09-15

## 2018-09-13 RX ADMIN — Medication 1 DROP(S): at 05:50

## 2018-09-13 RX ADMIN — Medication 1 DROP(S): at 17:43

## 2018-09-13 RX ADMIN — ALBUTEROL 2.5 MILLIGRAM(S): 90 AEROSOL, METERED ORAL at 07:33

## 2018-09-13 RX ADMIN — Medication 12.5 MILLILITER(S): at 09:59

## 2018-09-13 RX ADMIN — Medication 0.25 MILLIGRAM(S): at 12:16

## 2018-09-13 RX ADMIN — PIPERACILLIN AND TAZOBACTAM 25 GRAM(S): 4; .5 INJECTION, POWDER, LYOPHILIZED, FOR SOLUTION INTRAVENOUS at 12:16

## 2018-09-13 RX ADMIN — ALBUTEROL 2.5 MILLIGRAM(S): 90 AEROSOL, METERED ORAL at 13:32

## 2018-09-13 RX ADMIN — SODIUM CHLORIDE 50 MILLILITER(S): 9 INJECTION, SOLUTION INTRAVENOUS at 10:00

## 2018-09-13 RX ADMIN — PANTOPRAZOLE SODIUM 40 MILLIGRAM(S): 20 TABLET, DELAYED RELEASE ORAL at 12:16

## 2018-09-13 NOTE — BRIEF OPERATIVE NOTE - PROCEDURE
<<-----Click on this checkbox to enter Procedure PEG (percutaneous endoscopic gastrostomy)  09/13/2018    Active  JULIETHECK

## 2018-09-13 NOTE — PROGRESS NOTE ADULT - ASSESSMENT
84 M of PAF AC, CAD s/p remote PCI Luu's esophagus, a repaired Zenker's diverticulum, hypertension, reflux, hypercholesterolemia, glaucoma, moderate NICM, HCM s/p biV ICD p/w dyspnea.    - CT scan not consistent with severe volume overload; small pericardial and pleural effusions are noted.  - Likely some component of aspiration  - Please continue to maintain strict I/Os, monitor daily weights, Cr, and K.  - Ned are elevated but likely from demand ischemia  - Cont plavix and statin  - Cont Digoxin, digoxin level 1.0   - Continue to hole coumadin INR 4.29 supra-therapeutic 9/12 Vit K 2.5mg administered; 9/13 INR 1.52   - Trend CBC as he is at risk for acute blood loss   - Pulm follow up: high aspiration risk even with PEG, may need ABX for possible low resp tract infection based on Sx, Labs and Imaging  - Failed barium swallow test   -9/12 not able to place NG tube   - EGD being considered at this time, and I think his breathing has significantly improved to the extent of him tolerating an EGD.  - NPO for PEG today patient is optimized with no cardiac contraindication, He is at increased risk for procedure based on his advanced age and his multiple comorbidities, but there are no absolute contraindications to proceeding, close hemodynamic monitoring recommended. SBP 92 - 108, HR 70 - 75, Tmx 37.1  - Further cardiac workup will depend on clinical course.   - All other workup per primary team. Will followup. 84 M of PAF AC, CAD s/p remote PCI Luu's esophagus, a repaired Zenker's diverticulum, hypertension, reflux, hypercholesterolemia, glaucoma, moderate NICM, HCM s/p biV ICD p/w dyspnea.    - CT scan not consistent with severe volume overload; small pericardial and pleural effusions are noted.  - Likely some component of aspiration  - Please continue to maintain strict I/Os, monitor daily weights, Cr, and K.    - Ned are elevated but likely from demand ischemia  - Cont plavix and statin    - Cont Digoxin, digoxin level 1.0   - Continue to hold coumadin in anticipation of procedure, to be resumed eventually with goal inr 2-3    - Trend CBC as he is at risk for acute blood loss     - Failed barium swallow test   -9/12 not able to place NG tube    - NPO for PEG today patient is optimized with no cardiac contraindication, He is at increased risk for procedure based on his advanced age and his multiple comorbidities, but there are no absolute contraindications to proceeding, close hemodynamic monitoring recommended. SBP 92 - 108, HR 70 - 75, Tmx 37.1  - Further cardiac workup will depend on clinical course.     - All other workup per primary team. Will follow. Patient/Caregiver provided printed discharge information.

## 2018-09-13 NOTE — PROGRESS NOTE ADULT - SUBJECTIVE AND OBJECTIVE BOX
Date/Time Patient Seen:  		  Referring MD:   Data Reviewed	       Patient is a 85y old  Male who presents with a chief complaint of sob, dyspnea, difficulty walking, dysphagia (13 Sep 2018 09:18)  in bed  on room air  congested  planned for PEG    Subjective/HPI     PAST MEDICAL & SURGICAL HISTORY:  AF (atrial fibrillation): Cardioversion 5/2012  GERD (gastroesophageal reflux disease)  CHF (congestive heart failure)  CAD (coronary artery disease)  Glaucoma  HTN (Hypertension)  S/P ICD (internal cardiac defibrillator) procedure  H/O excision of Zenker's diverticulum  S/P Cataract Surgery - bilateral  s/p Angioplasty with Stent - 2010  History of Rotator Cuff Surgery Right and Left  S/P Cholecystectomy  S/P TURP (Transurethral Resection of Prostate)  S/P Prostatectomy - 2005  S/P Prostatectomy  S/P Prostatectomy        Medication list         MEDICATIONS  (STANDING):  ALBUTerol    0.083% 2.5 milliGRAM(s) Nebulizer every 6 hours  dextrose 5% + sodium chloride 0.45%. 1000 milliLiter(s) (50 mL/Hr) IV Continuous <Continuous>  digoxin  Injectable 0.25 milliGRAM(s) IV Push daily  guaiFENesin  milliGRAM(s) Oral every 12 hours  influenza   Vaccine 0.5 milliLiter(s) IntraMuscular once  metoprolol tartrate Injectable 5 milliGRAM(s) IV Push every 6 hours  pantoprazole  Injectable 40 milliGRAM(s) IV Push daily  timolol 0.5% Solution 1 Drop(s) Both EYES two times a day    MEDICATIONS  (PRN):         Vitals log        ICU Vital Signs Last 24 Hrs  T(C): 36.5 (13 Sep 2018 07:35), Max: 37.1 (12 Sep 2018 16:14)  T(F): 97.7 (13 Sep 2018 07:35), Max: 98.7 (12 Sep 2018 16:14)  HR: 70 (13 Sep 2018 07:35) (69 - 75)  BP: 92/62 (13 Sep 2018 07:35) (92/62 - 97/59)  BP(mean): --  ABP: --  ABP(mean): --  RR: 18 (13 Sep 2018 07:35) (17 - 18)  SpO2: 91% (13 Sep 2018 07:35) (91% - 99%)           Input and Output:  I&O's Detail    12 Sep 2018 07:01  -  13 Sep 2018 07:00  --------------------------------------------------------  IN:  Total IN: 0 mL    OUT:    Voided: 200 mL  Total OUT: 200 mL    Total NET: -200 mL          Lab Data                        12.7   13.34 )-----------( 260      ( 13 Sep 2018 07:29 )             37.5     09-13    144  |  109<H>  |  64<H>  ----------------------------<  49<L>  5.2   |  26  |  1.90<H>    Ca    9.0      13 Sep 2018 07:29              Review of Systems	      Objective     Physical Examination    heart s1s2  lung dec BS  abd soft      Pertinent Lab findings & Imaging      Sherly:  NO   Adequate UO     I&O's Detail    12 Sep 2018 07:01  -  13 Sep 2018 07:00  --------------------------------------------------------  IN:  Total IN: 0 mL    OUT:    Voided: 200 mL  Total OUT: 200 mL    Total NET: -200 mL               Discussed with:     Cultures:	        Radiology

## 2018-09-13 NOTE — PROGRESS NOTE ADULT - ATTENDING COMMENTS
The patient was personally seen and examined, in addition to being examined and evaluated by NP.  All elements of the note were edited where appropriate. The patient was personally seen and examined, in addition to being examined and evaluated by NP.  All elements of the note were edited where appropriate. Date of service 9/13/18.

## 2018-09-13 NOTE — PROGRESS NOTE ADULT - PROBLEM SELECTOR PLAN 3
TIBURCIO, likely 2/2 dehydration and now due to NPO status  Will give gentle IVF today and monitor volume status closely.  - monitor BMP

## 2018-09-13 NOTE — PROGRESS NOTE ADULT - PROBLEM SELECTOR PLAN 1
dysphagia  planned for PEG  HF - EF 40 pct  atelectasis, mucus plugging  poss lower resp tract infection / pulm congestion  on Albuterol  chest pt  suction PRN   HOB elev  oral hygiene  skin care  keep sat > 88 pct  Diuresis on hold for now  aspiration risk remains high even with PEG  ct chest reviewed  cont expectorant regimen  will follow and monitor   may need ABX for poss lower resp tract infection, based on Sx, Labs and Imaging  will discuss with PMD

## 2018-09-13 NOTE — PROGRESS NOTE ADULT - SUBJECTIVE AND OBJECTIVE BOX
HPI:  85 years old with history of PAF on coumadin (5mg x5 days and 2.5mg x2 days/week), CAD s/p remote PCI (1 stent), Luu's esophagus, a repaired Zenker's diverticulum, hypertension, reflux, hypercholesterolemia, glaucoma, moderate NICM, HCM s/p biV ICD p/w weakness and dyspnea. He has been complaining of more difficulty swallowing x a couple of days. He cannot swallow water at this point. He reports losing 18lbs since May but admits he has no appetite. He notes that he has gotten more dyspneic recently and can only walk a few feet. Despite using a cane, he reports difficulty walking. Recently he had his aldactone reduced by half because of weakness by Dr. Burgess. His PPM was recently interrogated (2 weeks ago). He has mild lower ext edema that is unchanged. Additionally, he reports that he has a small wound on his 2nd digit of right foot that is being managed by outpatient podiatrist. Denies any   palpitations, PND, orthopnea, near syncope, syncope,  stroke like symptoms, pain with swallowing.     INTERVAL EVENTS:   Patient seen and examined. No overnight events. Feels ok today, but complains of feeling hungry again today. He denies any chest pain, SOB, palpitations. No N/V/D. Feels the nebulizer treatment earlier this AM helped him. Going for PEG placement today.     REVIEW OF SYSTEMS:    CONSTITUTIONAL: No weakness, fevers or chills  EYES/ENT: No visual changes, no throat pain   RESPIRATORY: + cough, wheezing, hemoptysis; No shortness of breath  CARDIOVASCULAR: No chest pain or palpitations  GASTROINTESTINAL: No abdominal pain, nausea, vomiting, or hematemesis; No diarrhea or constipation. No melena or hematochezia.  GENITOURINARY: No dysuria, frequency or hematuria  NEUROLOGICAL: No dizziness, numbness, or weakness  SKIN: No itching, burning, rashes, or lesions   All other review of systems is negative unless indicated above.    VITAL SIGNS:  Vital Signs Last 24 Hrs  T(C): 36.5 (13 Sep 2018 07:35), Max: 37.1 (12 Sep 2018 16:14)  T(F): 97.7 (13 Sep 2018 07:35), Max: 98.7 (12 Sep 2018 16:14)  HR: 70 (13 Sep 2018 07:35) (69 - 75)  BP: 92/62 (13 Sep 2018 07:35) (92/62 - 97/59)  BP(mean): --  RR: 18 (13 Sep 2018 07:35) (17 - 18)  SpO2: 91% (13 Sep 2018 07:35) (91% - 99%)      PHYSICAL EXAM:     GENERAL: no acute distress  HEENT: NC/AT, EOMI, neck supple, MMM, large wound with eschar on scalp.  RESPIRATORY: good air entry bilaterally, upper airway transmitted noise, no wheeze, decreased BS at bases.  CARDIOVASCULAR: irregular, no murmurs, gallops, rubs  ABDOMINAL: soft, non-tender, non-distended, positive bowel sounds   EXTREMITIES: no clubbing, cyanosis, or edema  NEUROLOGICAL: alert and oriented x 3, non-focal  SKIN: no rashes or lesions   MUSCULOSKELETAL: no gross joint deformity    LABS:                     12.7   13.34 )-----------( 260      ( 13 Sep 2018 07:29 )             37.5   09-13    144  |  109<H>  |  64<H>  ----------------------------<  49<L>  5.2   |  26  |  1.90<H>    Ca    9.0      13 Sep 2018 07:29    INR: 1.52 ratio (09.13.18 @ 07:29)      MEDICATIONS  (STANDING):  ALBUTerol    0.083% 2.5 milliGRAM(s) Nebulizer every 6 hours  digoxin  Injectable 0.25 milliGRAM(s) IV Push daily  guaiFENesin  milliGRAM(s) Oral every 12 hours  influenza   Vaccine 0.5 milliLiter(s) IntraMuscular once  metoprolol tartrate Injectable 5 milliGRAM(s) IV Push every 6 hours  pantoprazole  Injectable 40 milliGRAM(s) IV Push daily  timolol 0.5% Solution 1 Drop(s) Both EYES two times a day    MEDICATIONS  (PRN):

## 2018-09-13 NOTE — PROGRESS NOTE ADULT - SUBJECTIVE AND OBJECTIVE BOX
CHIEF COMPLAINT: Patient is a 85y old  Male who presents with a chief complaint of sob, dyspnea, difficulty walking, dysphagia (13 Sep 2018 10:27)      HPI:  85 years old with history of PAF on coumadin (5mg x5 days and 2.5mg x2 days/week), CAD s/p remote PCI (1 stent), Luu's esophagus, a repaired Zenker's diverticulum, hypertension, reflux, hypercholesterolemia, glaucoma, moderate NICM, HCM s/p biV ICD p/w weakness and dyspnea. He has been complaining of more difficulty swallowing x a couple of days. He cannot swallow water at this point. He reports losing 18lbs since May but admits he has no appetite. He notes that he has gotten more dyspneic recently and can only walk a few feet. Despite using a cane, he reports difficulty walking. Recently he had his aldactone reduced by half because of weakness by Dr. Burgess. His PPM was recently interrogated (2 weeks ago). He has mild lower ext edema that is unchanged. Additionally, he reports that he has a small wound on his 2nd digit of right foot that is being managed by outpatient podiatrist. Denies any   palpitations, PND, orthopnea, near syncope, syncope,  stroke like symptoms, pain with swallowing.     In the ED, the patient's vital signs were T: 98, HR 60, /70, R: 16, SpO2 98% on RA. CBC WNL. PT/INR/PTT 43.1/3.85/47.0. CMP significant for BUN/Cr 52/1.80. Albumin 3.0. CE's significant for Troponin 0.245, CKMB 3.4. Serum BNP [id number]. CXR: Stable cardiomegaly with atherosclerotic aorta. Left cardiac pacer reidentified in situ. Hyperinflated lungs. Fibrotic atelectatic changes right mid and lower lung field similar to prior. New small right and small-to-moderate left pleural effusion. Suture anchors project over the bilateral shoulders. EKG: . Dr. Linda evaluated patient in ED. UA pending. (10 Sep 2018 19:01)      Follow Up: AF, CHF, Dyspnea    Interval History: Patient seen,  examined and evaluated,  resting comfortably in out of bed in NAD, with no respiratory distress, no chest pain, dyspnea, palpitations, PND, or orthopnea.    EKG:  < from: 12 Lead ECG (09.10.18 @ 17:39) >  Ventricular Rate 70 BPM    Atrial Rate 70 BPM    QRS Duration 154 ms    Q-T Interval 436 ms    QTC Calculation(Bezet) 470 ms    R Axis -53 degrees    T Axis -78 degrees    Diagnosis Line Ventricular-paced rhythm      REVIEW OF SYSTEMS:    CONSTITUTIONAL: No weakness, fevers or chills  EYES/ENT: No visual changes;  No vertigo or throat pain   NECK: No pain or stiffness  RESPIRATORY: No cough, wheezing, hemoptysis; No shortness of breath  CARDIOVASCULAR: No chest pain or palpitations  GASTROINTESTINAL: No abdominal or epigastric pain. No nausea, vomiting, or hematemesis; No diarrhea or constipation. No melena or hematochezia.  GENITOURINARY: No dysuria, frequency or hematuria  NEUROLOGICAL: No numbness or weakness  SKIN: No itching, rashes      PAST MEDICAL & SURGICAL HISTORY:  AF (atrial fibrillation): Cardioversion 5/2012  GERD (gastroesophageal reflux disease)  CHF (congestive heart failure)  CAD (coronary artery disease)  Glaucoma  HTN (Hypertension)  S/P ICD (internal cardiac defibrillator) procedure  H/O excision of Zenker's diverticulum  S/P Cataract Surgery - bilateral  s/p Angioplasty with Stent - 2010  History of Rotator Cuff Surgery Right and Left  S/P Cholecystectomy  S/P TURP (Transurethral Resection of Prostate)  S/P Prostatectomy - 2005      SOCIAL HISTORY:  No tobacco, ethanol, or drug abuse.    FAMILY HISTORY:  No pertinent family history in first degree relatives    No family history of acute MI or sudden cardiac death.    MEDICATIONS  (STANDING):  ALBUTerol    0.083% 2.5 milliGRAM(s) Nebulizer every 6 hours  dextrose 5% + sodium chloride 0.45%. 1000 milliLiter(s) (50 mL/Hr) IV Continuous <Continuous>  digoxin  Injectable 0.25 milliGRAM(s) IV Push daily  guaiFENesin  milliGRAM(s) Oral every 12 hours  influenza   Vaccine 0.5 milliLiter(s) IntraMuscular once  metoprolol tartrate Injectable 5 milliGRAM(s) IV Push every 6 hours  pantoprazole  Injectable 40 milliGRAM(s) IV Push daily  piperacillin/tazobactam IVPB. 3.375 Gram(s) IV Intermittent every 12 hours  timolol 0.5% Solution 1 Drop(s) Both EYES two times a day    MEDICATIONS  (PRN):      Allergies    No Known Allergies    Intolerances        Home meds:  Home Medications:  atorvastatin 40 mg oral tablet: 1 tab(s) orally once a day (at bedtime) (10 Sep 2018 20:24)  clopidogrel 75 mg oral tablet: 1 tab(s) orally once a day (10 Sep 2018 20:24)  digoxin 125 mcg (0.125 mg) oral tablet: 1 tab(s) orally once a day (10 Sep 2018 20:24)  Lasix 40 mg oral tablet: 1 tab(s) orally once a day (10 Sep 2018 20:24)  metoprolol succinate 25 mg oral tablet, extended release: orally 2 times a day (10 Sep 2018 20:24)  spironolactone 25 mg oral tablet: orally once a day (10 Sep 2018 20:24)  timolol hemihydrate 0.5% ophthalmic solution: 1 drop(s) to each affected eye 2 times a day (10 Sep 2018 20:24)  warfarin 2.5 mg oral tablet: 1 tab(s) orally 2 times a week (10 Sep 2018 20:24)  warfarin 5 mg oral tablet: orally 5 times a week (10 Sep 2018 20:24)        VITAL SIGNS:   Vital Signs Last 24 Hrs  T(C): 36.5 (13 Sep 2018 07:35), Max: 37.1 (12 Sep 2018 16:14)  T(F): 97.7 (13 Sep 2018 07:35), Max: 98.7 (12 Sep 2018 16:14)  HR: 70 (13 Sep 2018 07:35) (69 - 75)  BP: 92/62 (13 Sep 2018 07:35) (92/62 - 97/59)  BP(mean): --  RR: 18 (13 Sep 2018 07:35) (18 - 18)  SpO2: 91% (13 Sep 2018 07:35) (91% - 99%)    I&O's Summary    12 Sep 2018 07:01  -  13 Sep 2018 07:00  --------------------------------------------------------  IN: 0 mL / OUT: 200 mL / NET: -200 mL    13 Sep 2018 07:01  -  13 Sep 2018 11:35  --------------------------------------------------------  IN: 50 mL / OUT: 0 mL / NET: 50 mL        On Exam:  TELE: Paced 70s  Constitutional: NAD, awake and alert, frail  HEENT: Moist Mucous Membranes, Anicteric, large wound with eschar on scalp.  Pulmonary: Non-labored, breath sounds are clear bilaterally, No wheezing, rales or rhonchi  Cardiovascular: irregular, S1 and S2, No murmurs, rubs, gallops or clicks  Gastrointestinal: + Bowel Sounds present, soft, nontender.   Lymph: No peripheral edema. No lymphadenopathy.  Skin: No visible rashes or ulcers.  Psych:  Mood & affect appropriate    LABS: All Labs Reviewed:                        12.7   13.34 )-----------( 260      ( 13 Sep 2018 07:29 )             37.5                         11.8   12.23 )-----------( 250      ( 12 Sep 2018 12:46 )             35.5                         10.9   8.32  )-----------( 213      ( 11 Sep 2018 07:37 )             32.9     13 Sep 2018 07:29    144    |  109    |  64     ----------------------------<  49     5.2     |  26     |  1.90   12 Sep 2018 12:46    143    |  107    |  55     ----------------------------<  50     4.8     |  26     |  1.60   11 Sep 2018 07:37    142    |  106    |  48     ----------------------------<  68     4.3     |  29     |  1.60     Ca    9.0        13 Sep 2018 07:29  Ca    9.2        12 Sep 2018 12:46  Ca    9.0        11 Sep 2018 07:37    TPro  8.2    /  Alb  3.0    /  TBili  1.2    /  DBili  x      /  AST  33     /  ALT  16     /  AlkPhos  100    10 Sep 2018 16:34    PT/INR - ( 13 Sep 2018 07:29 )   PT: 16.7 sec;   INR: 1.52 ratio           Blood Culture:   09-10 @ 16:34  Pro Bnp [id number]    09-11 @ 07:37  TSH: 2.02  09-10 @ 16:34  TSH: 2.87    < from: TTE Echo Doppler w/o Cont (05.20.15 @ 09:36) >  CONCLUSIONS:  1. Normal left ventricular size with moderate global systolic   dysfunction. LVEF estimated at 40%.   2. Severe concentric left ventricular hypertrophy.  3. Right ventricular enlargement with decreased function.   4. Biatrial enlargement.  5. Mild mitral regurgitation.  6. Moderate tricuspid regurgitation.   7. Moderate pulmonary hypertension.  8. Small pericardial effusion. Bilateral pleural effusions.      RADIOLOGY:  < from: Xray Chest 1 View- PORTABLE-Urgent (09.10.18 @ 16:14) >  INTERPRETATION:  Cough, chest pain.    AP chest. Prior 5/17/2015.    Stable cardiomegaly with atherosclerotic aorta. Left cardiac pacer   reidentified in situ. Hyperinflated lungs. Fibrotic atelectatic changes   right mid and lower lung field similar to prior. New small right and   small-to-moderate left pleural effusion. Suture anchors project over the   bilateral shoulders.    Impression: As above      < from: CT Chest No Cont (09.11.18 @ 07:48) >  FINDINGS:    CHEST:     CHEST WALL AND LOWER NECK: Within normal limits  MEDIASTINUM AND DARINEL: Within normal limits  HEART: Cardiomegaly, pacemaker. Small pericardial effusion. Extensive   coronary calcifications. This workstation  VESSELS: Extensive calcifications.  LUNG AND LARGE AIRWAYS: Left lower lobe endobronchial mucoid impaction.   Nodular and tree-in-bud opacities left lower lobe, new from 2011. Small   pleural effusions, improved. Evidence of prior wedge resection right   lung. Bilateral segmental atelectasis.  VISUALIZED UPPER ABDOMEN: Within normal limits.  BONES: Spinal ankylosis.    IMPRESSION: Left lower lobe endobronchial mucoid impaction with   underlying airspace opacities, probably pneumonia, correlate for   aspiration. Follow-up to resolution.

## 2018-09-13 NOTE — CONSULT NOTE ADULT - SUBJECTIVE AND OBJECTIVE BOX
86 yo male with hx of Zenker's diverticulum, s/p surgery in past, now with dysphagia. PT admitted 3 days ago. PT had recent wt lost and unable to drink and eat food. PT instantly regurgitates.  Denies any SOB or chest pain. Still having normal bowel function. PT had swallow evaluation with dysfunction and requesting PEG    REVIEW OF SYSTEMS:    CONSTITUTIONAL: No weakness, fatigue, malaise, fevers or chills, no weight change, appetite change  EYES: No visual changes; No double vision,  No vertigo, eye pain  Ears: no otalgia, no otorhea, no hearing loss, tinnitus  Nose: no epistaxis, rhinorrhea, sinus pressure  Throat: no throat pain, no oral lesions  NECK: No pain or stiffness  RESPIRATORY: No cough (productive or dry), wheezing, hemoptysis; No shortness of breath  CARDIOVASCULAR: No chest pain or palpitations,    GASTROINTESTINAL: as above  GENITOURINARY: No dysuria, frequency, urgency or hematuria,    NEUROLOGICAL: No numbness or weakness, headache, memory loss,   SKIN: No pruritis, rashes, lesions or new moles  Psych: No anxiety, sadness, insomnia,    Endocrine: No Heat or Cold intolerance, polydipsia, polyphagia  Heme/Lymph: no LN enlargement, no easy bruising or bleeding     PAST MEDICAL & SURGICAL HISTORY:  AF (atrial fibrillation): Cardioversion 5/2012  GERD (gastroesophageal reflux disease)  CHF (congestive heart failure)  CAD (coronary artery disease)  Glaucoma  HTN (Hypertension)  S/P ICD (internal cardiac defibrillator) procedure  H/O excision of Zenker's diverticulum  S/P Cataract Surgery - bilateral  s/p Angioplasty with Stent - 2010  History of Rotator Cuff Surgery Right and Left  S/P Cholecystectomy  S/P TURP (Transurethral Resection of Prostate)  S/P Prostatectomy - 2005      Allergies    No Known Allergies    Intolerances    MEDICATIONS  (STANDING):  ALBUTerol    0.083% 2.5 milliGRAM(s) Nebulizer every 6 hours  dextrose 5% + sodium chloride 0.45%. 1000 milliLiter(s) (50 mL/Hr) IV Continuous <Continuous>  digoxin  Injectable 0.25 milliGRAM(s) IV Push daily  guaiFENesin  milliGRAM(s) Oral every 12 hours  influenza   Vaccine 0.5 milliLiter(s) IntraMuscular once  metoprolol tartrate Injectable 5 milliGRAM(s) IV Push every 6 hours  pantoprazole  Injectable 40 milliGRAM(s) IV Push daily  piperacillin/tazobactam IVPB. 3.375 Gram(s) IV Intermittent every 12 hours  timolol 0.5% Solution 1 Drop(s) Both EYES two times a day    MEDICATIONS  (PRN):      SH-negative    .  VITAL SIGNS:  T(C): 36.4 (09-13-18 @ 14:45), Max: 37.1 (09-12-18 @ 16:14)  T(F): 97.5 (09-13-18 @ 14:45), Max: 98.7 (09-12-18 @ 16:14)  HR: 70 (09-13-18 @ 14:45) (69 - 75)  BP: 93/51 (09-13-18 @ 14:45) (92/62 - 97/59)  BP(mean): --  RR: 16 (09-13-18 @ 14:45) (16 - 18)  SpO2: 97% (09-13-18 @ 14:45) (91% - 99%)  Wt(kg): --    PHYSICAL EXAM:    Constitutional:  NAD, resting comfortably in bed  Head: large eschar on scalp   Eyes: PERRL b/l  ENT: MMM  Neck: supple; no JVD or thyromegaly  Respiratory: CTA B/L   Cardiac: +S1/S2; RRR   Gastrointestinal: soft, NT/ND; no rebound or guarding; + BS, healed lower abdominal incision healed  Genitourinary: normal external genitalia  Back: no CVA B/L  Extremities: WWP, no clubbing or cyanosis; no peripheral edema  Musculoskeletal: NROM x4;   Vascular: 2+ radial, femoral, DP/PT pulses B/L  Dermatologic: skin warm, dry and intact; no rashes, wounds, or scars  Lymphatic: no submandibular, cervical or  LAD  Neurologic: AAOx3; CNII-XII grossly intact; no focal deficits  Psychiatric: affect and characteristics of appearance, verbalizations, behaviors are appropriate                          12.7   13.34 )-----------( 260      ( 13 Sep 2018 07:29 )             37.5   09-13    144  |  109<H>  |  64<H>  ----------------------------<  49<L>  5.2   |  26  |  1.90<H>    Ca    9.0      13 Sep 2018 07:29

## 2018-09-13 NOTE — PROGRESS NOTE ADULT - PROBLEM SELECTOR PLAN 1
FTT, likely 2/2 poor PO intake, dysphagia, age-related changes  - Significant aspiration and possible Zenker's diverticulum recurrence on MBSS.   - PT consult recommending HCPT.  - fall precautions  - safety precautions  - Seen by cardiothoracic surgery Shailesh, who recommends PEG placement for now and outpatient follow-up with eventual surgical intervention for Zenker's diverticulum.  -For PEG placement today. Vit. K given to bring down INR. Below 2 today. Patient may proceed with PEG placement pending cardio clearance. He is at increased risk for procedure based on his advanced age and his multiple comorbidities, but there are no absolute contraindications to proceeding. PPM interrogation report from 8/29 is on the chart.

## 2018-09-14 ENCOUNTER — TRANSCRIPTION ENCOUNTER (OUTPATIENT)
Age: 83
End: 2018-09-14

## 2018-09-14 LAB
ANION GAP SERPL CALC-SCNC: 9 MMOL/L — SIGNIFICANT CHANGE UP (ref 5–17)
BUN SERPL-MCNC: 79 MG/DL — HIGH (ref 7–23)
CALCIUM SERPL-MCNC: 9.1 MG/DL — SIGNIFICANT CHANGE UP (ref 8.5–10.1)
CHLORIDE SERPL-SCNC: 108 MMOL/L — SIGNIFICANT CHANGE UP (ref 96–108)
CO2 SERPL-SCNC: 25 MMOL/L — SIGNIFICANT CHANGE UP (ref 22–31)
CREAT SERPL-MCNC: 2.5 MG/DL — HIGH (ref 0.5–1.3)
GLUCOSE SERPL-MCNC: 125 MG/DL — HIGH (ref 70–99)
HCT VFR BLD CALC: 37 % — LOW (ref 39–50)
HGB BLD-MCNC: 12.1 G/DL — LOW (ref 13–17)
INR BLD: 1.22 RATIO — HIGH (ref 0.88–1.16)
MAGNESIUM SERPL-MCNC: 2.3 MG/DL — SIGNIFICANT CHANGE UP (ref 1.6–2.6)
MAGNESIUM SERPL-MCNC: 2.5 MG/DL — SIGNIFICANT CHANGE UP (ref 1.6–2.6)
MCHC RBC-ENTMCNC: 32 PG — SIGNIFICANT CHANGE UP (ref 27–34)
MCHC RBC-ENTMCNC: 32.7 GM/DL — SIGNIFICANT CHANGE UP (ref 32–36)
MCV RBC AUTO: 97.9 FL — SIGNIFICANT CHANGE UP (ref 80–100)
NRBC # BLD: 0 /100 WBCS — SIGNIFICANT CHANGE UP (ref 0–0)
PHOSPHATE SERPL-MCNC: 2.9 MG/DL — SIGNIFICANT CHANGE UP (ref 2.5–4.5)
PHOSPHATE SERPL-MCNC: 3.9 MG/DL — SIGNIFICANT CHANGE UP (ref 2.5–4.5)
PLATELET # BLD AUTO: 249 K/UL — SIGNIFICANT CHANGE UP (ref 150–400)
POTASSIUM SERPL-MCNC: 4.7 MMOL/L — SIGNIFICANT CHANGE UP (ref 3.5–5.3)
POTASSIUM SERPL-SCNC: 4.7 MMOL/L — SIGNIFICANT CHANGE UP (ref 3.5–5.3)
PROTHROM AB SERPL-ACNC: 13.4 SEC — HIGH (ref 9.8–12.7)
RBC # BLD: 3.78 M/UL — LOW (ref 4.2–5.8)
RBC # FLD: 14.6 % — HIGH (ref 10.3–14.5)
SODIUM SERPL-SCNC: 142 MMOL/L — SIGNIFICANT CHANGE UP (ref 135–145)
WBC # BLD: 16.17 K/UL — HIGH (ref 3.8–10.5)
WBC # FLD AUTO: 16.17 K/UL — HIGH (ref 3.8–10.5)

## 2018-09-14 PROCEDURE — 99233 SBSQ HOSP IP/OBS HIGH 50: CPT

## 2018-09-14 PROCEDURE — 99232 SBSQ HOSP IP/OBS MODERATE 35: CPT

## 2018-09-14 RX ORDER — METOPROLOL TARTRATE 50 MG
25 TABLET ORAL
Qty: 0 | Refills: 0 | Status: DISCONTINUED | OUTPATIENT
Start: 2018-09-14 | End: 2018-09-18

## 2018-09-14 RX ORDER — SODIUM CHLORIDE 9 MG/ML
500 INJECTION INTRAMUSCULAR; INTRAVENOUS; SUBCUTANEOUS ONCE
Qty: 0 | Refills: 0 | Status: COMPLETED | OUTPATIENT
Start: 2018-09-14 | End: 2018-09-14

## 2018-09-14 RX ORDER — DIGOXIN 250 MCG
0.12 TABLET ORAL DAILY
Qty: 0 | Refills: 0 | Status: DISCONTINUED | OUTPATIENT
Start: 2018-09-14 | End: 2018-09-20

## 2018-09-14 RX ORDER — MORPHINE SULFATE 50 MG/1
2 CAPSULE, EXTENDED RELEASE ORAL EVERY 6 HOURS
Qty: 0 | Refills: 0 | Status: DISCONTINUED | OUTPATIENT
Start: 2018-09-14 | End: 2018-09-14

## 2018-09-14 RX ORDER — ACETAMINOPHEN 500 MG
650 TABLET ORAL EVERY 6 HOURS
Qty: 0 | Refills: 0 | Status: DISCONTINUED | OUTPATIENT
Start: 2018-09-14 | End: 2018-09-20

## 2018-09-14 RX ORDER — ATORVASTATIN CALCIUM 80 MG/1
40 TABLET, FILM COATED ORAL AT BEDTIME
Qty: 0 | Refills: 0 | Status: DISCONTINUED | OUTPATIENT
Start: 2018-09-14 | End: 2018-09-20

## 2018-09-14 RX ADMIN — Medication 650 MILLIGRAM(S): at 12:00

## 2018-09-14 RX ADMIN — ATORVASTATIN CALCIUM 40 MILLIGRAM(S): 80 TABLET, FILM COATED ORAL at 22:16

## 2018-09-14 RX ADMIN — Medication 1 DROP(S): at 05:58

## 2018-09-14 RX ADMIN — SODIUM CHLORIDE 500 MILLILITER(S): 9 INJECTION INTRAMUSCULAR; INTRAVENOUS; SUBCUTANEOUS at 12:30

## 2018-09-14 RX ADMIN — ALBUTEROL 2.5 MILLIGRAM(S): 90 AEROSOL, METERED ORAL at 13:10

## 2018-09-14 RX ADMIN — PANTOPRAZOLE SODIUM 40 MILLIGRAM(S): 20 TABLET, DELAYED RELEASE ORAL at 12:30

## 2018-09-14 RX ADMIN — ALBUTEROL 2.5 MILLIGRAM(S): 90 AEROSOL, METERED ORAL at 07:07

## 2018-09-14 RX ADMIN — PIPERACILLIN AND TAZOBACTAM 25 GRAM(S): 4; .5 INJECTION, POWDER, LYOPHILIZED, FOR SOLUTION INTRAVENOUS at 00:08

## 2018-09-14 RX ADMIN — Medication 1 DROP(S): at 17:25

## 2018-09-14 RX ADMIN — PIPERACILLIN AND TAZOBACTAM 25 GRAM(S): 4; .5 INJECTION, POWDER, LYOPHILIZED, FOR SOLUTION INTRAVENOUS at 23:22

## 2018-09-14 RX ADMIN — PIPERACILLIN AND TAZOBACTAM 25 GRAM(S): 4; .5 INJECTION, POWDER, LYOPHILIZED, FOR SOLUTION INTRAVENOUS at 12:30

## 2018-09-14 RX ADMIN — Medication 200 MILLIGRAM(S): at 22:16

## 2018-09-14 RX ADMIN — Medication 0.25 MILLIGRAM(S): at 14:09

## 2018-09-14 RX ADMIN — Medication 650 MILLIGRAM(S): at 10:37

## 2018-09-14 RX ADMIN — ALBUTEROL 2.5 MILLIGRAM(S): 90 AEROSOL, METERED ORAL at 20:35

## 2018-09-14 NOTE — DISCHARGE NOTE ADULT - HOSPITAL COURSE
85 years old with history of PAF on coumadin (5mg x5 days and 2.5mg x2 days/week), CAD s/p remote PCI (1 stent), Luu's esophagus, a repaired Zenker's diverticulum, hypertension, reflux, hypercholesterolemia, glaucoma, moderate NICM, HCM s/p biV ICD p/w weakness and dyspnea. Patient had been complaining of more difficulty swallowing x a couple of days. He could not swallow water at time of presentation. He reported losing 18lbs since May and admitted he has no appetite.     Recently he had his aldactone reduced by half because of weakness by Dr. Burgess. His PPM was recently interrogated (2 weeks ago). He had mild lower ext edema that was chronic for him.    In the ED, the patient's vital signs were T: 98, HR 60, /70, R: 16, SpO2 98% on RA. CBC WNL. PT/INR/PTT 43.1/3.85/47.0. CMP significant for BUN/Cr 52/1.80. Albumin 3.0. CE's significant for Troponin 0.245, CKMB 3.4. Serum BNP 28059. CXR: Stable cardiomegaly with atherosclerotic aorta. Left cardiac pacer reidentified in situ. Hyperinflated lungs. Fibrotic atelectatic changes right mid and lower lung field similar to prior. New small right and small-to-moderate left pleural effusion. Suture anchors project over the bilateral shoulders. EKG:     Patient was admitted for failure to thrive, generalized weakness, difficulty ambulating, and dysphagia. He was evaluated by speech and language pathology and found to be aspirating, He had a MBSS which showed significant aspiration with all consistencies as well as findings suspicious for recurrence of Zenker's diverticulum. He was evaluated cardiothoracic surgery Dr. Cash, who agreed that patient had a Zenker's diverticulum. He recommended a PEG tube for nutrition and then eventual outpatient eval for repair of the diverticulum.     Patient was seen by Dr. Feldman from GI who placed a PEG tube, and the following day, tube feeds were started.     He was followed by Dr. Adams from pulmonology and he was treated with nebulizer treatments, chest PT, and IV Zosyn.    His creatinine began to rise further and Renal Dr. Lainez was consulted. Patient was found to be retaining 450 cc on bladder scan and a butts was placed. His TIBURCIO was likely due to retention and to dehydration in the setting of poor po intake prior to arrival and NPO status while waiting for placement of PEG. He was treated with IV fluids. 85 years old with history of PAF on coumadin (5mg x5 days and 2.5mg x2 days/week), CAD s/p remote PCI (1 stent), Luu's esophagus, a repaired Zenker's diverticulum, hypertension, reflux, hypercholesterolemia, glaucoma, moderate NICM, HCM s/p biV ICD p/w weakness and dyspnea. Patient had been complaining of more difficulty swallowing x a couple of days. He could not swallow water at time of presentation. He reported losing 18lbs since May and admitted he has no appetite. Recently he had his aldactone reduced by half because of weakness by Dr. Burgess. His PPM was recently interrogated (2 weeks ago). He had mild lower ext edema that was chronic for him.  In the ED, the patient's vital signs were T: 98, HR 60, /70, R: 16, SpO2 98% on RA. CBC WNL. PT/INR/PTT 43.1/3.85/47.0. CMP significant for BUN/Cr 52/1.80. Albumin 3.0. CE's significant for Troponin 0.245, CKMB 3.4. Serum BNP 83446. CXR: Stable cardiomegaly with atherosclerotic aorta. Left cardiac pacer reidentified in situ. Hyperinflated lungs. Fibrotic atelectatic changes right mid and lower lung field similar to prior. New small right and small-to-moderate left pleural effusion. Suture anchors project over the bilateral shoulders. EKG: .  Patient was admitted for failure to thrive, generalized weakness, difficulty ambulating, and dysphagia. He was evaluated by speech and language pathology and found to be aspirating, He had a MBSS which showed significant aspiration with all consistencies as well as findings suspicious for recurrence of Zenker's diverticulum. He was evaluated cardiothoracic surgery Dr. Cash, who agreed that patient had a Zenker's diverticulum. He recommended a PEG tube for nutrition and then eventual outpatient eval for repair of the diverticulum. Patient was seen by Dr. Feldman from GI who placed a PEG tube on 9/13, and the following day, tube feeds were started. He was followed by Dr. Adams from pulmonology and he was treated with nebulizer treatments, chest PT, and IV Zosyn for a total of 7 days of treatment.  His creatinine began to rise further and Renal Dr. Lainez was consulted. Patient was found to be retaining 450 cc on bladder scan and a butts was placed. His TIBURCIO was likely due to retention and to dehydration in the setting of poor po intake prior to arrival and NPO status while waiting for placement of PEG. He was treated with IV fluids.  Renal sono was performed and showed no hydronephrosis.  Patient was restarted on coumadin once INR was therapeutic (as patient was supra-therapeutic before).  Patient endorsed LUQ pain, CT abdomen pelvis done showed no bowel obstruction or bowel inflammation with small pericardial effusion.  CXR was repeated on patient as patient continued to cough and was not getting better/worse on abx and CXR showed significant worsening in bibasilar airspace disease. Small right pleural effusion similar to prior. Moderate left pleural effusion slightly increased.  ID (Marlon) was consulted, recommended _____________. 85 years old with history of PAF on coumadin (5mg x5 days and 2.5mg x2 days/week), CAD s/p remote PCI (1 stent), Luu's esophagus, a repaired Zenker's diverticulum, hypertension, reflux, hypercholesterolemia, glaucoma, moderate NICM, HCM s/p biV ICD p/w weakness and dyspnea. Patient had been complaining of more difficulty swallowing x a couple of days. He could not swallow water at time of presentation. He reported losing 18lbs since May and admitted he has no appetite. Recently he had his aldactone reduced by half because of weakness by Dr. Burgess. His PPM was recently interrogated (2 weeks ago). He had mild lower ext edema that was chronic for him.  In the ED, the patient's vital signs were T: 98, HR 60, /70, R: 16, SpO2 98% on RA. CBC WNL. PT/INR/PTT 43.1/3.85/47.0. CMP significant for BUN/Cr 52/1.80. Albumin 3.0. CE's significant for Troponin 0.245, CKMB 3.4. Serum BNP 67034. CXR: Stable cardiomegaly with atherosclerotic aorta. Left cardiac pacer reidentified in situ. Hyperinflated lungs. Fibrotic atelectatic changes right mid and lower lung field similar to prior. New small right and small-to-moderate left pleural effusion. Suture anchors project over the bilateral shoulders. EKG: .  Patient was admitted for failure to thrive, generalized weakness, difficulty ambulating, and dysphagia. He was evaluated by speech and language pathology and found to be aspirating, He had a MBSS which showed significant aspiration with all consistencies as well as findings suspicious for recurrence of Zenker's diverticulum. He was evaluated cardiothoracic surgery Dr. Cash, who agreed that patient had a Zenker's diverticulum. He recommended a PEG tube for nutrition and then eventual outpatient eval for repair of the diverticulum. Patient was seen by Dr. Feldman from GI who placed a PEG tube on 9/13, and the following day, tube feeds were started. He was followed by Dr. Adams from pulmonology and he was treated with nebulizer treatments, chest PT, and IV Zosyn for a total of 7 days of treatment.  His creatinine began to rise further and Renal Dr. Lainez was consulted. Patient was found to be retaining 450 cc on bladder scan and a butts was placed. His TIBURCIO was likely due to urinary retention and dehydration in the setting of poor po intake prior to arrival and NPO status while waiting for placement of PEG. He was treated with IV fluids.  Renal sono was performed and showed no hydronephrosis.  Patient was restarted on coumadin once INR was therapeutic (as patient was supra-therapeutic before).  Patient endorsed LUQ pain, CT abdomen pelvis done showed no bowel obstruction or bowel inflammation with small pericardial effusion.  CXR was repeated on patient as patient continued to cough and was not getting better/worse on abx and CXR showed significant worsening in bibasilar airspace disease. Small right pleural effusion similar to prior. Moderate left pleural effusion slightly increased.  ID (Marlon) was consulted, recommended to finish the 7 day total antibiotic treatment.  Patient seemed to have clinically improved at the end of the antibiotic course.  Butts was discontinued and patient had ______ TOV.    Vital Signs Last 24 Hrs  T(C): 36.4 (20 Sep 2018 07:05), Max: 36.7 (20 Sep 2018 00:24)  T(F): 97.6 (20 Sep 2018 07:05), Max: 98.1 (20 Sep 2018 00:24)  HR: 70 (20 Sep 2018 10:41) (69 - 78)  BP: 97/59 (20 Sep 2018 07:20) (84/54 - 102/65)  RR: 18 (20 Sep 2018 07:20) (17 - 19)  SpO2: 94% (20 Sep 2018 10:41) (94% - 99%)      Physical Exam:  HEENT: NC/AT, EOMI, neck supple, MMM, large wound with eschar on scalp  RESPIRATORY: diminished breath sounds at the bases b/l  CARDIOVASCULAR: RRR, no murmurs, gallops, rubs  ABDOMINAL: soft, non-tender, non-distended, positive bowel sounds, PEG+ with dressing, c/d/i  EXTREMITIES: no clubbing, cyanosis, or edema 85 years old with history of PAF on coumadin (5mg x5 days and 2.5mg x2 days/week), CAD s/p remote PCI (1 stent), Luu's esophagus, a repaired Zenker's diverticulum, hypertension, reflux, hypercholesterolemia, glaucoma, moderate NICM, HCM s/p biV ICD p/w weakness and dyspnea. Patient had been complaining of more difficulty swallowing x a couple of days. He could not swallow water at time of presentation. He reported losing 18lbs since May and admitted he has no appetite. Recently he had his aldactone reduced by half because of weakness by Dr. Burgess. His PPM was recently interrogated (2 weeks ago). He had mild lower ext edema that was chronic for him.  In the ED, the patient's vital signs were T: 98, HR 60, /70, R: 16, SpO2 98% on RA. CBC WNL. PT/INR/PTT 43.1/3.85/47.0. CMP significant for BUN/Cr 52/1.80. Albumin 3.0. CE's significant for Troponin 0.245, CKMB 3.4. Serum BNP 41196. CXR: Stable cardiomegaly with atherosclerotic aorta. Left cardiac pacer reidentified in situ. Hyperinflated lungs. Fibrotic atelectatic changes right mid and lower lung field similar to prior. New small right and small-to-moderate left pleural effusion. Suture anchors project over the bilateral shoulders. EKG: .  Patient was admitted for failure to thrive, generalized weakness, difficulty ambulating, and dysphagia. He was evaluated by speech and language pathology and found to be aspirating, He had a MBSS which showed significant aspiration with all consistencies as well as findings suspicious for recurrence of Zenker's diverticulum. He was evaluated cardiothoracic surgery Dr. Cash, who agreed that patient had a Zenker's diverticulum. He recommended a PEG tube for nutrition and then eventual outpatient eval for repair of the diverticulum. Patient was seen by Dr. Feldman from GI who placed a PEG tube on 9/13, and the following day, tube feeds were started. He was followed by Dr. Adams from pulmonology and he was treated with nebulizer treatments, chest PT, and IV Zosyn for a total of 7 days of treatment.  His creatinine began to rise further and Renal Dr. Lainez was consulted. Patient was found to be retaining 450 cc on bladder scan and a butts was placed. His TIBURCIO was likely due to urinary retention and dehydration in the setting of poor po intake prior to arrival and NPO status while waiting for placement of PEG. He was treated with IV fluids.  Renal sono was performed and showed no hydronephrosis.  Patient was restarted on coumadin once INR was therapeutic (as patient was supra-therapeutic before).  Patient endorsed LUQ pain, CT abdomen pelvis done showed no bowel obstruction or bowel inflammation with small pericardial effusion.  CXR was repeated on patient as patient continued to cough and was not getting better/worse on abx and CXR showed significant worsening in bibasilar airspace disease. Small right pleural effusion similar to prior. Moderate left pleural effusion slightly increased.  ID (Marlon) was consulted, recommended to finish the 7 day total antibiotic treatment.  Patient seemed to have clinically improved at the end of the antibiotic course.  Butts was discontinued and patient failed TOV.  Butts reinserted, patient to be discharge to rehab with butts catheter.  Patient seen and examined on day of discharge.  No acute complaints.  Patient medically optimized for discharge to rehab.    Vital Signs Last 24 Hrs  T(C): 36.4 (20 Sep 2018 07:05), Max: 36.7 (20 Sep 2018 00:24)  T(F): 97.6 (20 Sep 2018 07:05), Max: 98.1 (20 Sep 2018 00:24)  HR: 70 (20 Sep 2018 10:41) (69 - 78)  BP: 97/59 (20 Sep 2018 07:20) (84/54 - 102/65)  RR: 18 (20 Sep 2018 07:20) (17 - 19)  SpO2: 94% (20 Sep 2018 10:41) (94% - 99%)      Physical Exam:  HEENT: NC/AT, EOMI, neck supple, MMM, large wound with eschar on scalp  RESPIRATORY: diminished breath sounds at the bases b/l  CARDIOVASCULAR: RRR, no murmurs, gallops, rubs  ABDOMINAL: soft, non-tender, non-distended, positive bowel sounds, PEG+ with dressing, c/d/i  EXTREMITIES: no clubbing, cyanosis, or edema 85 years old with history of PAF on coumadin (5mg x5 days and 2.5mg x2 days/week), CAD s/p remote PCI (1 stent), Luu's esophagus, a repaired Zenker's diverticulum, hypertension, reflux, hypercholesterolemia, glaucoma, moderate NICM, HCM s/p biV ICD p/w weakness and dyspnea. Patient had been complaining of more difficulty swallowing x a couple of days. He could not swallow water at time of presentation. He reported losing 18lbs since May and admitted he has no appetite. Recently he had his aldactone reduced by half because of weakness by Dr. Burgess. His PPM was recently interrogated (2 weeks ago). He had mild lower ext edema that was chronic for him.  In the ED, the patient's vital signs were T: 98, HR 60, /70, R: 16, SpO2 98% on RA. CBC WNL. PT/INR/PTT 43.1/3.85/47.0. CMP significant for BUN/Cr 52/1.80. Albumin 3.0. CE's significant for Troponin 0.245, CKMB 3.4. Serum BNP 16865. CXR: Stable cardiomegaly with atherosclerotic aorta. Left cardiac pacer reidentified in situ. Hyperinflated lungs. Fibrotic atelectatic changes right mid and lower lung field similar to prior. New small right and small-to-moderate left pleural effusion. Suture anchors project over the bilateral shoulders. EKG: .  Patient was admitted for failure to thrive, generalized weakness, difficulty ambulating, and dysphagia. He was evaluated by speech and language pathology and found to be aspirating, He had a MBSS which showed significant aspiration with all consistencies as well as findings suspicious for recurrence of Zenker's diverticulum. He was evaluated cardiothoracic surgery Dr. Cash, who agreed that patient had a Zenker's diverticulum. He recommended a PEG tube for nutrition and then eventual outpatient eval for repair of the diverticulum. Patient was seen by Dr. Feldman from GI who placed a PEG tube on 9/13, and the following day, tube feeds were started. He was followed by Dr. Adams from pulmonology and he was treated with nebulizer treatments, chest PT, and IV Zosyn for a total of 7 days of treatment.  His creatinine began to rise further and Renal Dr. Lainez was consulted. Patient was found to be retaining 450 cc on bladder scan and a butts was placed. His TIBURCIO was likely due to urinary retention and dehydration in the setting of poor po intake prior to arrival and NPO status while waiting for placement of PEG. He was treated with IV fluids.  Renal sono was performed and showed no hydronephrosis.  Patient was restarted on coumadin once INR was therapeutic (as patient was supra-therapeutic before).  Patient endorsed LUQ pain, CT abdomen pelvis done showed no bowel obstruction or bowel inflammation with small pericardial effusion.  CXR was repeated on patient as patient continued to cough and was not getting better/worse on abx and CXR showed significant worsening in bibasilar airspace disease. Small right pleural effusion similar to prior. Moderate left pleural effusion slightly increased.  ID (Marlon) was consulted, recommended to finish the 7 day total antibiotic treatment.  Patient seemed to have clinically improved at the end of the antibiotic course.  Butts was discontinued and patient failed TOV.  Butts reinserted, patient to be discharge to rehab with butts catheter.  Patient seen and examined on day of discharge.  No acute complaints.  Patient medically optimized for discharge to rehab.    Vital Signs Last 24 Hrs  T(C): 36.4 (20 Sep 2018 07:05), Max: 36.7 (20 Sep 2018 00:24)  T(F): 97.6 (20 Sep 2018 07:05), Max: 98.1 (20 Sep 2018 00:24)  HR: 70 (20 Sep 2018 10:41) (69 - 78)  BP: 97/59 (20 Sep 2018 07:20) (84/54 - 102/65)  RR: 18 (20 Sep 2018 07:20) (17 - 19)  SpO2: 94% (20 Sep 2018 10:41) (94% - 99%)      Physical Exam:  HEENT: NC/AT, EOMI, neck supple, MMM, large wound with eschar on scalp  RESPIRATORY: diminished breath sounds at the bases b/l  CARDIOVASCULAR: RRR, no murmurs, gallops, rubs  ABDOMINAL: soft, non-tender, non-distended, positive bowel sounds, PEG+ with dressing, c/d/i  EXTREMITIES: no clubbing, cyanosis, or edema    PMD notifoed

## 2018-09-14 NOTE — PROGRESS NOTE ADULT - PROBLEM SELECTOR PLAN 1
s/p PEG  op notes reviewed  dysphagia  remains at risk for aspiration  on emp ABX for asp pna  Zosyn ABX therapy  WBC noted this am  serial labs  serial PE  Albuterol NEBS  chest pt  cough regimen  monitor vs and HD and Sat  keep sat > 88 pct  will follow and monitor  prognosis guarded  pt is very frail and weak

## 2018-09-14 NOTE — CHART NOTE - NSCHARTNOTEFT_GEN_A_CORE
Called by RN for 10 beats of Vtach on Telemonitor.  Patient seen and examined at bedside. Patient is currently Nebulizer treatment currently.  Patient does not offer any complaints. States that he has had weakness for the last week and a cough that started 2 weeks ago, both of which have not improved.  Denies CP, SOB, palpitations, abdominal pain, N/V/D/C, HA, dizziness.       STAT VS   T 98.1  BP 87/51  HR 70  RR 18  SpO2 99% on RA      Physical :  Gen- NAD, resting comfortably in bed  Cardio - s1,s2, rrr, no murmur  Lung - cta b/l, no wheeze, no rhonchi, no rales   Abdomen- +BS, NT/ND, no guarding, no rebound,  Ext- no edema, 2+ pulses b/l      Assessment/Plan  86 yo M with history of PAF on coumadin (5mg x5 days and 2.5mg x2 days/week), HFpEF (EF 40%), moderate NICM, HCM s/p biV ICD, CAD s/p remote PCI (1 stent),  HTN, reflux p/w weakness and dyspnea. Admitted for FTT, dysphagia, elevated troponins, and TIBURCIO. On Abx for Aspiration PNA , w/ moderate L Pleural effusion. On PEG for FTT. Now with non sustained Vtach, V paced and hypotension in setting of CHF/HFrEF and Asp PNA.    1. VTach is likely 2/2 to underlying illness   STAT EKG shows Vpaced in 70s. No Vtach or acute ischemic changes.  STAT Mg and Phos, wnl    2. Hypotension, pt Asx.  Given pt hx of CHF, pleural effusion will hold IVF for now. If persists, will consider giving midodrine    Will continue to monitor. RN to call with status change.

## 2018-09-14 NOTE — PROGRESS NOTE ADULT - SUBJECTIVE AND OBJECTIVE BOX
Interval Events: s/p EGD with PEG placement  Pt admits to mild incisional pain when he coughs  PEG site c/d/i  Feeds ordered to start today    MEDICATIONS  (STANDING):  ALBUTerol    0.083% 2.5 milliGRAM(s) Nebulizer every 6 hours  dextrose 5% + sodium chloride 0.45%. 1000 milliLiter(s) (50 mL/Hr) IV Continuous <Continuous>  digoxin  Injectable 0.25 milliGRAM(s) IV Push daily  guaiFENesin  milliGRAM(s) Oral every 12 hours  influenza   Vaccine 0.5 milliLiter(s) IntraMuscular once  metoprolol tartrate Injectable 5 milliGRAM(s) IV Push every 6 hours  morphine   Solution 2 milliGRAM(s) Oral every 6 hours  pantoprazole  Injectable 40 milliGRAM(s) IV Push daily  piperacillin/tazobactam IVPB. 3.375 Gram(s) IV Intermittent every 12 hours  timolol 0.5% Solution 1 Drop(s) Both EYES two times a day    MEDICATIONS  (PRN):      Allergies    No Known Allergies    Intolerances        Review of Systems:    General:  No wt loss, fevers, chills, night sweats,fatigue,   Eyes:  Good vision, no reported pain  ENT:  No sore throat, pain, runny nose, dysphagia  CV:  No pain, palpitations, hypo/hypertension  Resp:  No dyspnea, cough, tachypnea, wheezing  GI:  + mild incisional pain,  No nausea, No vomiting, No diarrhea, No constipation, No weight loss, No fever, No pruritis, No rectal bleeding, No melena, No dysphagia  :  No pain, bleeding, incontinence, nocturia  Muscle:  No pain, weakness  Neuro:  No weakness, tingling, memory problems  Psych:  No fatigue, insomnia, mood problems, depression  Endocrine:  No polyuria, polydypsia, cold/heat intolerance  Heme:  No petechiae, ecchymosis, easy bruisability  Skin:  No rash, tattoos, scars, edema      Vital Signs Last 24 Hrs  T(C): 36.4 (14 Sep 2018 07:34), Max: 36.8 (13 Sep 2018 12:15)  T(F): 97.6 (14 Sep 2018 07:34), Max: 98.2 (13 Sep 2018 12:15)  HR: 70 (14 Sep 2018 07:34) (70 - 78)  BP: 90/58 (14 Sep 2018 07:34) (90/52 - 96/61)  BP(mean): --  RR: 19 (14 Sep 2018 07:34) (16 - 19)  SpO2: 100% (14 Sep 2018 07:34) (95% - 100%)    PHYSICAL EXAM:    Constitutional: NAD, well-developed  HEENT: EOMI, throat clear  Neck: No LAD, supple  Respiratory: CTA and P  Cardiovascular: S1 and S2, RRR, no M  Gastrointestinal: BS+, soft, NT/ND, neg HSM, PEG c/d/i  Extremities: No peripheral edema, neg clubbing, cyanosis  Vascular: 2+ peripheral pulses  Neurological: A/O x 3, no focal deficits  Psychiatric: Normal mood, normal affect  Skin: No rashes      LABS:                        12.1   16.17 )-----------( 249      ( 14 Sep 2018 07:17 )             37.0     09-14    142  |  108  |  79<H>  ----------------------------<  125<H>  4.7   |  25  |  2.50<H>    Ca    9.1      14 Sep 2018 07:17  Phos  3.9     09-14  Mg     2.5     09-14      PT/INR - ( 14 Sep 2018 07:17 )   PT: 13.4 sec;   INR: 1.22 ratio               RADIOLOGY & ADDITIONAL TESTS:

## 2018-09-14 NOTE — PROGRESS NOTE ADULT - SUBJECTIVE AND OBJECTIVE BOX
HPI:  85 years old with history of PAF on coumadin (5mg x5 days and 2.5mg x2 days/week), CAD s/p remote PCI (1 stent), Luu's esophagus, a repaired Zenker's diverticulum, hypertension, reflux, hypercholesterolemia, glaucoma, moderate NICM, HCM s/p biV ICD p/w weakness and dyspnea. He has been complaining of more difficulty swallowing x a couple of days. He cannot swallow water at this point. He reports losing 18lbs since May but admits he has no appetite. He notes that he has gotten more dyspneic recently and can only walk a few feet. Despite using a cane, he reports difficulty walking. Recently he had his aldactone reduced by half because of weakness by Dr. Burgess. His PPM was recently interrogated (2 weeks ago). He has mild lower ext edema that is unchanged. Additionally, he reports that he has a small wound on his 2nd digit of right foot that is being managed by outpatient podiatrist. Denies any   palpitations, PND, orthopnea, near syncope, syncope,  stroke like symptoms, pain with swallowing.     INTERVAL EVENTS:   Patient seen and examined. No overnight events. Feels ok today, but complains some discomfort at new PEG site. He denies any chest pain, SOB, palpitations. No N/V/D.      REVIEW OF SYSTEMS:    CONSTITUTIONAL: No weakness, fevers or chills  EYES/ENT: No visual changes, no throat pain   RESPIRATORY: + cough, wheezing, hemoptysis; No shortness of breath  CARDIOVASCULAR: No chest pain or palpitations  GASTROINTESTINAL: No abdominal pain, nausea, vomiting, or hematemesis; No diarrhea or constipation. No melena or hematochezia.  GENITOURINARY: No dysuria, frequency or hematuria  NEUROLOGICAL: No dizziness, numbness, or weakness  SKIN: No itching, burning, rashes, or lesions   All other review of systems is negative unless indicated above.    VITAL SIGNS:  Vital Signs Last 24 Hrs  T(C): 36.4 (14 Sep 2018 07:34), Max: 36.8 (13 Sep 2018 12:15)  T(F): 97.6 (14 Sep 2018 07:34), Max: 98.2 (13 Sep 2018 12:15)  HR: 70 (14 Sep 2018 07:34) (70 - 78)  BP: 90/58 (14 Sep 2018 07:34) (90/52 - 96/61)  BP(mean): --  RR: 19 (14 Sep 2018 07:34) (16 - 19)  SpO2: 100% (14 Sep 2018 07:34) (95% - 100%)      PHYSICAL EXAM:     GENERAL: no acute distress  HEENT: NC/AT, EOMI, neck supple, MMM, large wound with eschar on scalp.  RESPIRATORY: good air entry bilaterally, upper airway transmitted noise, no wheeze, decreased BS at bases.  CARDIOVASCULAR: irregular, no murmurs, gallops, rubs  ABDOMINAL: soft, non-tender, non-distended, positive bowel sounds   EXTREMITIES: no clubbing, cyanosis, or edema  NEUROLOGICAL: alert and oriented x 3, non-focal  SKIN: no rashes or lesions   MUSCULOSKELETAL: no gross joint deformity    LABS:                          12.1   16.17 )-----------( 249      ( 14 Sep 2018 07:17 )             37.0   09-14    142  |  108  |  79<H>  ----------------------------<  125<H>  4.7   |  25  |  2.50<H>    Ca    9.1      14 Sep 2018 07:17  Phos  3.9     09-14  Mg     2.5     09-14        MEDICATIONS  (STANDING):  ALBUTerol    0.083% 2.5 milliGRAM(s) Nebulizer every 6 hours  dextrose 5% + sodium chloride 0.45%. 1000 milliLiter(s) (50 mL/Hr) IV Continuous <Continuous>  digoxin  Injectable 0.25 milliGRAM(s) IV Push daily  guaiFENesin  milliGRAM(s) Oral every 12 hours  influenza   Vaccine 0.5 milliLiter(s) IntraMuscular once  metoprolol tartrate Injectable 5 milliGRAM(s) IV Push every 6 hours  morphine   Solution 2 milliGRAM(s) Oral every 6 hours  pantoprazole  Injectable 40 milliGRAM(s) IV Push daily  piperacillin/tazobactam IVPB. 3.375 Gram(s) IV Intermittent every 12 hours  timolol 0.5% Solution 1 Drop(s) Both EYES two times a day    MEDICATIONS  (PRN):  acetaminophen    Suspension .. 650 milliGRAM(s) Enteral Tube every 6 hours PRN Mild Pain (1 - 3)

## 2018-09-14 NOTE — PROGRESS NOTE ADULT - PROBLEM SELECTOR PLAN 1
s/p EGD with PEG placement 9/14. Feeds ordered to start today  PEG care  monitor residuals   hx of zenkers, sp remote repair  ct chest reviewed  ivf as needed  aspiration precautions, elevate hob  ppi iv qd  nutrition recs appreciated  further plan of care to be dw attg  will follow s/p EGD with PEG placement 9/14. Feeds ordered to start today  PEG care  monitor residuals   hx of zenyvonne sp remote repair  ct chest reviewed  ivf as needed  aspiration precautions, elevate hob  ppi iv qd  nutrition recs appreciated  further plan of care to be dw attg  can restart anticoagulation tomorrow 9/15  will follow

## 2018-09-14 NOTE — PROGRESS NOTE ADULT - PROBLEM SELECTOR PLAN 3
TIBURCIO, likely 2/2 dehydration now worsening. Likely due to poor po intake and possible element of ATN due to hypotension in setting of hypovolemia.   Will continue gentle IVF today and monitor volume status closely.  - monitor BMP  Renal Korrapati to eval. TIBURCIO on CKD3, likely 2/2 dehydration now worsening. Likely due to poor po intake and possible element of ATN due to hypotension in setting of hypovolemia.   Will continue gentle IVF today and monitor volume status closely.  - monitor BMP  Renal Korrapati to eval.

## 2018-09-14 NOTE — DISCHARGE NOTE ADULT - MEDICATION SUMMARY - MEDICATIONS TO TAKE
I will START or STAY ON the medications listed below when I get home from the hospital:    acetaminophen 160 mg/5 mL oral suspension  -- 20.31 milliliter(s) by mouth every 6 hours, As needed, Mild Pain (1 - 3)  -- Indication: For Pain    digoxin 125 mcg (0.125 mg) oral tablet  -- 1 tab(s) by mouth once a day  -- Indication: For Afib    warfarin 5 mg oral tablet  -- 1 tab(s) by mouth once  -- Indication: For Afib    atorvastatin 40 mg oral tablet  -- 1 tab(s) by mouth once a day (at bedtime)  -- Indication: For CAD (coronary artery disease)    clopidogrel 75 mg oral tablet  -- 1 tab(s) by mouth once a day  -- Indication: For CAD (coronary artery disease)    metoprolol tartrate 25 mg oral tablet  -- 1 tab(s) by mouth 2 times a day  -- Indication: For Afib    albuterol 2.5 mg/3 mL (0.083%) inhalation solution  -- 2.5 milligram(s) inhaled every 6 hours  -- Indication: For PRADO (dyspnea on exertion)    guaiFENesin 100 mg/5 mL oral liquid  -- 10 milliliter(s) by mouth every 6 hours, As needed, Cough  -- Indication: For PRADO (dyspnea on exertion)    midodrine 2.5 mg oral tablet  -- 1 tab(s) by mouth 2 times a day  -- Indication: For Hypotension    timolol maleate 0.5% ophthalmic solution  -- 1 drop(s) to each affected eye 2 times a day  -- Indication: For Glaucoma    pantoprazole 40 mg oral granule, delayed release  -- 40 milligram(s) by percutaneous administration once a day mix with applesauce or juice  -- Indication: For Zenker diverticulum

## 2018-09-14 NOTE — DISCHARGE NOTE ADULT - CARE PROVIDERS DIRECT ADDRESSES
[email or web address]

## 2018-09-14 NOTE — PROGRESS NOTE ADULT - ASSESSMENT
85 years old with history of HTN, CAD, CHF, PPM, AF now admitted with wt loss, difficulty swallowing. Now he is S/P PEG and is on tube feeds. He developed TIBURCIO, which is most likely due to pre renal azotemia and/or Urinary retention. Will place a butts catheter and will give a bolus of IVF.  Will recheck labs in AM and also obtain a renal sonogram. Spoke to family at bed side.

## 2018-09-14 NOTE — DISCHARGE NOTE ADULT - CARE PROVIDER_API CALL
Tonny Mendoza), Internal Medicine  [address] [place] [address]  Phone: [phone]  Fax: [phone]    Carlos Burgess), Cardiovascular Disease  [address] [place] [address]  Phone: [phone]  Fax: [phone]

## 2018-09-14 NOTE — PROGRESS NOTE ADULT - PROBLEM SELECTOR PLAN 1
FTT, likely 2/2 poor PO intake, dysphagia, age-related changes  - Significant aspiration and possible Zenker's diverticulum recurrence on MBSS.   - PT consult recommending HCPT originally--much weaker today. PT to re-eval.  - fall precautions  - safety precautions  - Seen by cardiothoracic surgery Shailesh, who recommends PEG placement for now and outpatient follow-up with eventual surgical intervention for Zenker's diverticulum.  Had PEG placed yesterday. Feeds started today. FTT, likely 2/2 poor PO intake, dysphagia, age-related changes, has severe malnutrition.  - Significant aspiration and possible Zenker's diverticulum recurrence on MBSS.   - PT consult recommending HCPT originally--much weaker today. PT to re-eval.  - fall precautions  - safety precautions  - Seen by cardiothoracic surgery Shailesh, who recommends PEG placement for now and outpatient follow-up with eventual surgical intervention for Zenker's diverticulum.  Had PEG placed yesterday. Feeds started today.

## 2018-09-14 NOTE — PROGRESS NOTE ADULT - SUBJECTIVE AND OBJECTIVE BOX
Nephrology Consultation: MD CHARISMA Landaverde, KATE  85y    HPI:  85 years old with history of PAF on coumadin (5mg x5 days and 2.5mg x2 days/week), CAD s/p remote PCI (1 stent), Luu's esophagus, a repaired Zenker's diverticulum, hypertension, reflux, hypercholesterolemia, glaucoma, moderate NICM, HCM s/p biV ICD p/w weakness and dyspnea. He has been complaining of more difficulty swallowing x a couple of days. He cannot swallow water at this point. He reports losing 18lbs since May but admits he has no appetite. He notes that he has gotten more dyspneic recently and can only walk a few feet. Despite using a cane, he reports difficulty walking. Recently he had his aldactone reduced by half because of weakness by Dr. Burgess. His PPM was recently interrogated (2 weeks ago). Denies any palpitations, SOB, PND, orthopnea, near syncope, syncope or stroke like symptoms. He had a PEG placement yesterday.     PAST MEDICAL & SURGICAL HISTORY:  AF (atrial fibrillation): Cardioversion 5/2012  GERD (gastroesophageal reflux disease)  CHF (congestive heart failure)  CAD (coronary artery disease)  Glaucoma  HTN (Hypertension)  S/P ICD (internal cardiac defibrillator) procedure  H/O excision of Zenker's diverticulum  S/P Cataract Surgery - bilateral  s/p Angioplasty with Stent - 2010  History of Rotator Cuff Surgery Right and Left  S/P Cholecystectomy  S/P TURP (Transurethral Resection of Prostate)  S/P Prostatectomy - 2005      Allergies    No Known Allergies    Intolerances    Home Medications:  atorvastatin 40 mg oral tablet: 1 tab(s) orally once a day (at bedtime) (10 Sep 2018 20:24)  clopidogrel 75 mg oral tablet: 1 tab(s) orally once a day (10 Sep 2018 20:24)  digoxin 125 mcg (0.125 mg) oral tablet: 1 tab(s) orally once a day (10 Sep 2018 20:24)  Lasix 40 mg oral tablet: 1 tab(s) orally once a day (10 Sep 2018 20:24)  metoprolol succinate 25 mg oral tablet, extended release: orally 2 times a day (10 Sep 2018 20:24)  spironolactone 25 mg oral tablet: orally once a day (10 Sep 2018 20:24)  timolol hemihydrate 0.5% ophthalmic solution: 1 drop(s) to each affected eye 2 times a day (10 Sep 2018 20:24)  warfarin 2.5 mg oral tablet: 1 tab(s) orally 2 times a week (10 Sep 2018 20:24)  warfarin 5 mg oral tablet: orally 5 times a week (10 Sep 2018 20:24)        FAMILY HISTORY:  No pertinent family history in first degree relatives      SOCIAL HISTORY:    REVIEW OF SYSTEMS:    Constitutional: No fever, weight loss or fatigue  Eyes: No eye pain, visual disturbances, or discharge  ENT:  No difficulty hearing, tinnitus, vertigo; No sinus or throat pain  Neck: No pain or stiffness  Breasts: No pain, masses or nipple discharge  Respiratory: No cough, wheezing, chills or hemoptysis  Cardiovascular: No chest pain, palpitations, shortness of breath, dizziness or leg swelling  Gastrointestinal: No abdominal or epigastric pain. No nausea, vomiting or hematemesis; No diarrhea or constipation. No melena or hematochezia.  Genitourinary: No dysuria, frequency, hematuria or incontinence  Rectal: No pain, hemorrhoids or incontinence  Neurological: No headaches, memory loss, loss of strength, numbness or tremors  Skin: No itching, burning, rashes or lesions   Lymph Nodes: No enlarged glands  Endocrine: No heat or cold intolerance; No hair loss  Musculoskeletal: No joint pain or swelling; No muscle, back or extremity pain  Psychiatric: No depression, anxiety, mood swings or difficulty sleeping  Heme/Lymph: No easy bruising or bleeding gums  Allergy and Immunologic: No hives or eczema    acetaminophen    Suspension .. 650 milliGRAM(s) Enteral Tube every 6 hours PRN  ALBUTerol    0.083% 2.5 milliGRAM(s) Nebulizer every 6 hours  dextrose 5% + sodium chloride 0.45%. 1000 milliLiter(s) IV Continuous <Continuous>  digoxin  Injectable 0.25 milliGRAM(s) IV Push daily  guaiFENesin  milliGRAM(s) Oral every 12 hours  influenza   Vaccine 0.5 milliLiter(s) IntraMuscular once  metoprolol tartrate Injectable 5 milliGRAM(s) IV Push every 6 hours  pantoprazole  Injectable 40 milliGRAM(s) IV Push daily  piperacillin/tazobactam IVPB. 3.375 Gram(s) IV Intermittent every 12 hours  timolol 0.5% Solution 1 Drop(s) Both EYES two times a day      Vital Signs Last 24 Hrs  T(C): 36.3 (14 Sep 2018 12:06), Max: 36.5 (13 Sep 2018 23:21)  T(F): 97.4 (14 Sep 2018 12:06), Max: 97.7 (13 Sep 2018 23:21)  HR: 102 (14 Sep 2018 13:10) (70 - 102)  BP: 82/48 (14 Sep 2018 12:23) (81/50 - 96/61)  BP(mean): --  RR: 18 (14 Sep 2018 12:06) (16 - 19)  SpO2: 95% (14 Sep 2018 12:06) (95% - 100%)    PHYSICAL EXAM:    Constitutional: NAD, well-groomed, well-developed  HEENT: PERRLA, EOMI, Normal Hearing, MMM  Neck: No LAD, No JVD  Back: Normal spine flexure, No CVA tenderness  Respiratory: CTAB/L   Cardiovascular: S1 and S2, RRR, no M/G/R  Gastrointestinal: BS+, soft, NT/ND  Extremities: No peripheral edema  Vascular: 2+ peripheral pulses  Neurological: A/O x 3, no focal deficits  Skin: No rashes      LABS:                        12.1   16.17 )-----------( 249      ( 14 Sep 2018 07:17 )             37.0     09-14    142  |  108  |  79<H>  ----------------------------<  125<H>  4.7   |  25  |  2.50<H>    Ca    9.1      14 Sep 2018 07:17  Phos  3.9     09-14  Mg     2.5     09-14      PT/INR - ( 14 Sep 2018 07:17 )   PT: 13.4 sec;   INR: 1.22 ratio               MICROBIOLOGY:  RECENT CULTURES:        RADIOLOGY & ADDITIONAL STUDIES:    < from: CT Chest No Cont (09.11.18 @ 07:48) >  PROCEDURE:   CT of the Chest was performed without intravenous contrast.  Sagittal and coronal reformats were performed.      FINDINGS:    CHEST:     CHEST WALL AND LOWER NECK: Within normal limits  MEDIASTINUM AND DARINEL: Within normal limits  HEART: Cardiomegaly, pacemaker. Small pericardial effusion. Extensive   coronary calcifications. This workstation  VESSELS: Extensive calcifications.  LUNG AND LARGE AIRWAYS: Left lower lobe endobronchial mucoid impaction.   Nodular and tree-in-bud opacities left lower lobe, new from 2011. Small   pleural effusions, improved. Evidence of prior wedge resection right   lung. Bilateral segmental atelectasis.  VISUALIZED UPPER ABDOMEN: Within normal limits.  BONES: Spinal ankylosis.    IMPRESSION: Left lower lobe endobronchial mucoid impaction with   underlying airspace opacities, probably pneumonia, correlate for   aspiration. Follow-up to resolution.    Small pleural effusions. Small pericardial effusion.

## 2018-09-14 NOTE — DISCHARGE NOTE ADULT - PLAN OF CARE
Stable You were admitted for malnutrition, difficulty eating and poor oral intake.  You were monitored during the beginning of your admission with eating food by mouth, however it was very limited given your Zenker diverticulum and would constantly cause coughing.  Due to fear of aspirating on your food, the possibility of a PEG tube placement was discussed, which was agreed upon, and placed on 9/13.  Since then you have been fed via PEG tube and have been responding well.  Please continue to have the PEG tube checked for residuals and follow up with your PMD. Your kidney function was slightly decreased which was possibly caused by dehydration causing the blood pressure to be low, and the kidneys receiving less blood (being perfused less).  You received fluids during the hospitalization, and you're blood pressure was closely monitored.  Your lasix was held due to the decreased kidney function and you were started on midodrine to allow for your blood pressure to be increased.  Your blood pressure remained on the lower side but was stable and your kidney function improved, and returned to baseline.  Please follow up with the nephrologist outpatient. Your coumadin was held during the beginning of the hospitlization as the INR was elevated and supertherapetuic.  Once the INR was within the therapeutic range, you were restarted on coumadin and the INR was checked daily.  You were continued on digoxin during the entire hospitalization and your heart rate and rhythm was monitored around the clock on the tele monitor. Please continue to take coumadin and follow up with your PMD. A CT scan of your chest was done and showed fluid that was present in your lung.  You received antibiotics for 7 days for possible aspiration pneumonia and completed treatment during the hospitalization.  You clinically improved and were seen by the pulmonologist daily.  Please follow up with your PMD. Your blood pressure was actually on the lower side during the hospitalization, and your blood pressure medicine was continued with holding parameters.  Since your blood pressure was on the lower side, you were started on midodrine to help keep your blood pressure elevated.  Please follow up with your PMD. Your fluid status was checked daily, and you did not seem fluid overloaded during the hospitalization. Because of your decreased kidney function, the lasix was held.  Please follow up with the cardiologist outpatient.

## 2018-09-14 NOTE — PROGRESS NOTE ADULT - SUBJECTIVE AND OBJECTIVE BOX
pt seen  c/o pain  no n/v  ICU Vital Signs Last 24 Hrs  T(C): 36.4 (14 Sep 2018 07:34), Max: 36.8 (13 Sep 2018 12:15)  T(F): 97.6 (14 Sep 2018 07:34), Max: 98.2 (13 Sep 2018 12:15)  HR: 70 (14 Sep 2018 07:34) (70 - 78)  BP: 90/58 (14 Sep 2018 07:34) (90/52 - 96/61)  BP(mean): --  ABP: --  ABP(mean): --  RR: 19 (14 Sep 2018 07:34) (16 - 19)  SpO2: 100% (14 Sep 2018 07:34) (95% - 100%)  gen-NAD  resp-clear b/l  abd-soft NT/ND, dressing c/d/i

## 2018-09-14 NOTE — DISCHARGE NOTE ADULT - CARE PLAN
Principal Discharge DX:	Failure to thrive in adult  Goal:	Stable  Assessment and plan of treatment:	You were admitted for malnutrition, difficulty eating and poor oral intake.  You were monitored during the beginning of your admission with eating food by mouth, however it was very limited given your Zenker diverticulum and would constantly cause coughing.  Due to fear of aspirating on your food, the possibility of a PEG tube placement was discussed, which was agreed upon, and placed on 9/13.  Since then you have been fed via PEG tube and have been responding well.  Please continue to have the PEG tube checked for residuals and follow up with your PMD.  Secondary Diagnosis:	TIBURCIO (acute kidney injury)  Assessment and plan of treatment:	Your kidney function was slightly decreased which was possibly caused by dehydration causing the blood pressure to be low, and the kidneys receiving less blood (being perfused less).  You received fluids during the hospitalization, and you're blood pressure was closely monitored.  Your lasix was held due to the decreased kidney function and you were started on midodrine to allow for your blood pressure to be increased.  Your blood pressure remained on the lower side but was stable and your kidney function improved, and returned to baseline.  Please follow up with the nephrologist outpatient.  Secondary Diagnosis:	Paroxysmal A-fib  Assessment and plan of treatment:	Your coumadin was held during the beginning of the hospitlization as the INR was elevated and supertherapetuic.  Once the INR was within the therapeutic range, you were restarted on coumadin and the INR was checked daily.  You were continued on digoxin during the entire hospitalization and your heart rate and rhythm was monitored around the clock on the tele monitor. Please continue to take coumadin and follow up with your PMD.  Secondary Diagnosis:	PRADO (dyspnea on exertion)  Assessment and plan of treatment:	A CT scan of your chest was done and showed fluid that was present in your lung.  You received antibiotics for 7 days for possible aspiration pneumonia and completed treatment during the hospitalization.  You clinically improved and were seen by the pulmonologist daily.  Please follow up with your PMD.  Secondary Diagnosis:	HTN (Hypertension)  Assessment and plan of treatment:	Your blood pressure was actually on the lower side during the hospitalization, and your blood pressure medicine was continued with holding parameters.  Since your blood pressure was on the lower side, you were started on midodrine to help keep your blood pressure elevated.  Please follow up with your PMD.  Secondary Diagnosis:	CHF (congestive heart failure)  Assessment and plan of treatment:	Your fluid status was checked daily, and you did not seem fluid overloaded during the hospitalization. Because of your decreased kidney function, the lasix was held.  Please follow up with the cardiologist outpatient.

## 2018-09-14 NOTE — PROGRESS NOTE ADULT - SUBJECTIVE AND OBJECTIVE BOX
85y Male     T(C): 36.4 (09-14-18 @ 07:34), Max: 36.8 (09-13-18 @ 12:15)  HR: 70 (09-14-18 @ 07:34) (70 - 78)  BP: 90/58 (09-14-18 @ 07:34) (90/52 - 96/61)  RR: 19 (09-14-18 @ 07:34) (16 - 19)  SpO2: 100% (09-14-18 @ 07:34) (95% - 100%)  Wt(kg): --    Pt seen, doing well, no anesthesia complications or complaints noted or reported.   No Nausea  Pain well controlled

## 2018-09-14 NOTE — PROGRESS NOTE ADULT - SUBJECTIVE AND OBJECTIVE BOX
[facility] -- Cory Fragoso, Niurka, Aditya, Alexei Tipton Savella  Office # [phone]      Follow Up:  AF, CHF, Dyspnea    Subjective/Observations: Seen and examine.  Resting in bed arouses easily.  NAD denies cp, sob or palpitations.  Tolerating TF via peg.  No signs of orthopnea or PND.  NAD.       REVIEW OF SYSTEMS: All other review of systems is negative unless indicated above    PAST MEDICAL & SURGICAL HISTORY:  AF (atrial fibrillation): Cardioversion 5/2012  GERD (gastroesophageal reflux disease)  CHF (congestive heart failure)  CAD (coronary artery disease)  Glaucoma  HTN (Hypertension)  S/P ICD (internal cardiac defibrillator) procedure  H/O excision of Zenker's diverticulum  S/P Cataract Surgery - bilateral  s/p Angioplasty with Stent - 2010  History of Rotator Cuff Surgery Right and Left  S/P Cholecystectomy  S/P TURP (Transurethral Resection of Prostate)  S/P Prostatectomy - 2005      MEDICATIONS  (STANDING):  ALBUTerol    0.083% 2.5 milliGRAM(s) Nebulizer every 6 hours  dextrose 5% + sodium chloride 0.45%. 1000 milliLiter(s) (50 mL/Hr) IV Continuous <Continuous>  digoxin     Tablet 0.125 milliGRAM(s) Oral daily  guaiFENesin  milliGRAM(s) Oral every 12 hours  influenza   Vaccine 0.5 milliLiter(s) IntraMuscular once  metoprolol tartrate 25 milliGRAM(s) Oral two times a day  pantoprazole  Injectable 40 milliGRAM(s) IV Push daily  piperacillin/tazobactam IVPB. 3.375 Gram(s) IV Intermittent every 12 hours  timolol 0.5% Solution 1 Drop(s) Both EYES two times a day    MEDICATIONS  (PRN):  acetaminophen    Suspension .. 650 milliGRAM(s) Enteral Tube every 6 hours PRN Mild Pain (1 - 3)      Allergies    No Known Allergies    Intolerances            Vital Signs Last 24 Hrs  T(C): 36.3 (14 Sep 2018 12:06), Max: 36.5 (13 Sep 2018 23:21)  T(F): 97.4 (14 Sep 2018 12:06), Max: 97.7 (13 Sep 2018 23:21)  HR: 102 (14 Sep 2018 13:10) (70 - 102)  BP: 92/44 (14 Sep 2018 14:38) (81/50 - 96/61)  BP(mean): --  RR: 18 (14 Sep 2018 12:06) (17 - 19)  SpO2: 95% (14 Sep 2018 12:06) (95% - 100%)    I&O's Summary    13 Sep 2018 07:01  -  14 Sep 2018 07:00  --------------------------------------------------------  IN: 1100 mL / OUT: 0 mL / NET: 1100 mL    14 Sep 2018 07:01  -  14 Sep 2018 15:32  --------------------------------------------------------  IN: 750 mL / OUT: 0 mL / NET: 750 mL          PHYSICAL EXAM:  TELE: Vpaced 70s  Constitutional: NAD, arouses easily, frail, cachectic  HEENT: Dry Mucous Membranes, Anicteric  Pulmonary: Non-labored, breath sounds are clear bilaterally, No wheezing, rales or rhonchi  Cardiovascular: Regular, S1 and S2, No murmurs, rubs, gallops or clicks  Gastrointestinal: Bowel Sounds present, soft, nontender. Peg tube with feeding ongoing   Lymph: No peripheral edema. No lymphadenopathy.  Skin: No visible rashes or ulcers.  Psych:  Mood & affect flat    LABS: All Labs Reviewed:                        12.1   16.17 )-----------( 249      ( 14 Sep 2018 07:17 )             37.0                         12.7   13.34 )-----------( 260      ( 13 Sep 2018 07:29 )             37.5                         11.8   12.23 )-----------( 250      ( 12 Sep 2018 12:46 )             35.5     14 Sep 2018 07:17    142    |  108    |  79     ----------------------------<  125    4.7     |  25     |  2.50   13 Sep 2018 07:29    144    |  109    |  64     ----------------------------<  49     5.2     |  26     |  1.90   12 Sep 2018 12:46    143    |  107    |  55     ----------------------------<  50     4.8     |  26     |  1.60     Ca    9.1        14 Sep 2018 07:17  Ca    9.0        13 Sep 2018 07:29  Ca    9.2        12 Sep 2018 12:46  Phos  3.9       14 Sep 2018 07:17  Mg     2.5       14 Sep 2018 07:17      PT/INR - ( 14 Sep 2018 07:17 )   PT: 13.4 sec;   INR: 1.22 ratio        < from: 12 Lead ECG (09.10.18 @ 17:39) >  Ventricular Rate 70 BPM    Atrial Rate 70 BPM    QRS Duration 154 ms    Q-T Interval 436 ms    QTC Calculation(Bezet) 470 ms    R Axis -53 degrees    T Axis -78 degrees    Diagnosis Line Ventricular-paced rhythm  Confirmed by SARITA TIPTON ([id number])on 9/11/2018 11:32:55 AM    < end of copied text >       < from: TTE Echo Doppler w/o Cont (05.20.15 @ 09:36) >   EXAM:  ECHO TTE W/O CON COMP W/DOPPLR           PROCEDURE DATE:  05/20/2015      INTERPRETATION:  Ordering Physician: PATITO MARTINEZ    Indication: Congestive heart failure    Technician: IGOR    Study Quality: Fair     Height: 190.5 cm  Weight: 95 kg  Blood Pressure: 102/60    MEASUREMENTS  IVS: 2.0 cm  PWT: 2.0 cm  LA: 4.2 cm  Aortic Root: 3.6 cm  LVIDd: 4.3 cm  LVIDs: 3.2 cm    LVEF: Approximately 40%    FINDINGS  Left Ventricle: Normal left ventricular size with moderate global   systolicdysfunction. LVEF estimated at 40%. Severe concentric left   ventricular hypertrophy.  Right Ventricle: Right ventricular enlargement with decreased function. A   pacing wire is seen in the right heart.  Left Atrium: Mild left atrial enlargement.  Right Atrium: Right atrial enlargement. The IVC is dilated and collapses   less than 50% with inspiration, suggesting elevated right atrial pressure.  Mitral Valve: Normal mitral valve. Mild mitral regurgitation.  Aortic Valve: Aortic valve sclerosis.  Tricuspid Valve: Normal tricuspid valve. Moderate tricuspid   regurgitation. Pulmonary artery systolic pressure estimated at 51 mmHg,   assuming a right atrial pressure of 15 mmHg, consistent with moderate   pulmonary hypertension.  Pulmonic Valve: Normal pulmonic valve.  Pericardium/Pleura: Small pericardial effusion. Bilateral pleural   effusions.      CONCLUSIONS:  1. Normal left ventricular size with moderate global systolic   dysfunction. LVEF estimated at 40%.   2. Severe concentric left ventricular hypertrophy.  3. Right ventricular enlargement with decreased function.   4. Biatrial enlargement.  5. Mild mitral regurgitation.  6. Moderate tricuspid regurgitation.   7. Moderate pulmonary hypertension.  8. Small pericardial effusion. Bilateral pleural effusions.              MAYITO CRAMER M.D., ATTENDING CARDIOLOGIST  This examination was interpreted on: May 21 2015 12:23P.  This document   has been electronically signed. May 21 2015 12:27P.    < end of copied text >        < from: CT Chest No Cont (09.11.18 @ 07:48) >  EXAM:  CT CHEST                            PROCEDURE DATE:  09/11/2018          INTERPRETATION:  CLINICAL INFORMATION: Dyspnea    COMPARISON: 2011    PROCEDURE:   CT of the Chest was performed without intravenous contrast.  Sagittal and coronal reformats were performed.      FINDINGS:    CHEST:     CHEST WALL AND LOWER NECK: Within normal limits  MEDIASTINUM AND DARINEL: Within normal limits  HEART: Cardiomegaly, pacemaker. Small pericardial effusion. Extensive   coronary calcifications. This workstation  VESSELS: Extensive calcifications.  LUNG AND LARGE AIRWAYS: Left lower lobe endobronchial mucoid impaction.   Nodular and tree-in-bud opacities left lower lobe, new from 2011. Small   pleural effusions, improved. Evidence of prior wedge resection right   lung. Bilateral segmental atelectasis.  VISUALIZED UPPER ABDOMEN: Within normal limits.  BONES: Spinal ankylosis.    IMPRESSION: Left lower lobe endobronchial mucoid impaction with   underlying airspace opacities, probably pneumonia, correlate for   aspiration. Follow-up to resolution.    Small pleural effusions. Small pericardial effusion.                    MONTSE LOVE M.D., ATTENDING RADIOLOGIST  This document has been electronically signed. Sep 11 2018  8:30AM                < end of copied text >

## 2018-09-14 NOTE — DISCHARGE NOTE ADULT - SECONDARY DIAGNOSIS.
TIBURCIO (acute kidney injury) Paroxysmal A-fib PRADO (dyspnea on exertion) HTN (Hypertension) CHF (congestive heart failure)

## 2018-09-14 NOTE — PROGRESS NOTE ADULT - SUBJECTIVE AND OBJECTIVE BOX
Date/Time Patient Seen:  		  Referring MD:   Data Reviewed	       Patient is a 85y old  Male who presents with a chief complaint of sob, dyspnea, difficulty walking, dysphagia (13 Sep 2018 16:07)    in bed  seen and examined  vs and meds reviewed    Subjective/HPI     PAST MEDICAL & SURGICAL HISTORY:  AF (atrial fibrillation): Cardioversion 5/2012  GERD (gastroesophageal reflux disease)  CHF (congestive heart failure)  CAD (coronary artery disease)  Glaucoma  HTN (Hypertension)  S/P ICD (internal cardiac defibrillator) procedure  H/O excision of Zenker's diverticulum  S/P Cataract Surgery - bilateral  s/p Angioplasty with Stent - 2010  History of Rotator Cuff Surgery Right and Left  S/P Cholecystectomy  S/P TURP (Transurethral Resection of Prostate)  S/P Prostatectomy - 2005  S/P Prostatectomy  S/P Prostatectomy        Medication list         MEDICATIONS  (STANDING):  ALBUTerol    0.083% 2.5 milliGRAM(s) Nebulizer every 6 hours  dextrose 5% + sodium chloride 0.45%. 1000 milliLiter(s) (50 mL/Hr) IV Continuous <Continuous>  digoxin  Injectable 0.25 milliGRAM(s) IV Push daily  guaiFENesin  milliGRAM(s) Oral every 12 hours  influenza   Vaccine 0.5 milliLiter(s) IntraMuscular once  metoprolol tartrate Injectable 5 milliGRAM(s) IV Push every 6 hours  pantoprazole  Injectable 40 milliGRAM(s) IV Push daily  piperacillin/tazobactam IVPB. 3.375 Gram(s) IV Intermittent every 12 hours  timolol 0.5% Solution 1 Drop(s) Both EYES two times a day    MEDICATIONS  (PRN):         Vitals log        ICU Vital Signs Last 24 Hrs  T(C): 36.4 (14 Sep 2018 07:34), Max: 36.8 (13 Sep 2018 12:15)  T(F): 97.6 (14 Sep 2018 07:34), Max: 98.2 (13 Sep 2018 12:15)  HR: 70 (14 Sep 2018 07:34) (70 - 78)  BP: 90/58 (14 Sep 2018 07:34) (90/52 - 96/61)  BP(mean): --  ABP: --  ABP(mean): --  RR: 19 (14 Sep 2018 07:34) (16 - 19)  SpO2: 100% (14 Sep 2018 07:34) (95% - 100%)           Input and Output:  I&O's Detail    13 Sep 2018 07:01  -  14 Sep 2018 07:00  --------------------------------------------------------  IN:    dextrose 5% + sodium chloride 0.45%.: 900 mL    Solution: 200 mL  Total IN: 1100 mL    OUT:  Total OUT: 0 mL    Total NET: 1100 mL          Lab Data                        12.1   16.17 )-----------( 249      ( 14 Sep 2018 07:17 )             37.0     09-14    142  |  108  |  79<H>  ----------------------------<  125<H>  4.7   |  25  |  2.50<H>    Ca    9.1      14 Sep 2018 07:17  Phos  3.9     09-14  Mg     2.5     09-14              Review of Systems	      Objective     Physical Examination    heart s1s2  lung dec BS  abd soft      Pertinent Lab findings & Imaging      Sherly:  NO   Adequate UO     I&O's Detail    13 Sep 2018 07:01  -  14 Sep 2018 07:00  --------------------------------------------------------  IN:    dextrose 5% + sodium chloride 0.45%.: 900 mL    Solution: 200 mL  Total IN: 1100 mL    OUT:  Total OUT: 0 mL    Total NET: 1100 mL               Discussed with:     Cultures:	        Radiology

## 2018-09-14 NOTE — DISCHARGE NOTE ADULT - PATIENT PORTAL LINK FT
You can access the [facility] Patient Portal, offered by [facility], by registering with the following website: [email or web address]

## 2018-09-15 LAB
ANION GAP SERPL CALC-SCNC: 8 MMOL/L — SIGNIFICANT CHANGE UP (ref 5–17)
BUN SERPL-MCNC: 77 MG/DL — HIGH (ref 7–23)
CALCIUM SERPL-MCNC: 8.4 MG/DL — LOW (ref 8.5–10.1)
CHLORIDE SERPL-SCNC: 108 MMOL/L — SIGNIFICANT CHANGE UP (ref 96–108)
CO2 SERPL-SCNC: 25 MMOL/L — SIGNIFICANT CHANGE UP (ref 22–31)
CREAT SERPL-MCNC: 2.3 MG/DL — HIGH (ref 0.5–1.3)
GLUCOSE SERPL-MCNC: 187 MG/DL — HIGH (ref 70–99)
HCT VFR BLD CALC: 34.6 % — LOW (ref 39–50)
HGB BLD-MCNC: 11.6 G/DL — LOW (ref 13–17)
INR BLD: 1.62 RATIO — HIGH (ref 0.88–1.16)
MCHC RBC-ENTMCNC: 32.8 PG — SIGNIFICANT CHANGE UP (ref 27–34)
MCHC RBC-ENTMCNC: 33.5 GM/DL — SIGNIFICANT CHANGE UP (ref 32–36)
MCV RBC AUTO: 97.7 FL — SIGNIFICANT CHANGE UP (ref 80–100)
NRBC # BLD: 0 /100 WBCS — SIGNIFICANT CHANGE UP (ref 0–0)
PLATELET # BLD AUTO: 200 K/UL — SIGNIFICANT CHANGE UP (ref 150–400)
POTASSIUM SERPL-MCNC: 4.3 MMOL/L — SIGNIFICANT CHANGE UP (ref 3.5–5.3)
POTASSIUM SERPL-SCNC: 4.3 MMOL/L — SIGNIFICANT CHANGE UP (ref 3.5–5.3)
PROTHROM AB SERPL-ACNC: 17.8 SEC — HIGH (ref 9.8–12.7)
RBC # BLD: 3.54 M/UL — LOW (ref 4.2–5.8)
RBC # FLD: 14.7 % — HIGH (ref 10.3–14.5)
SODIUM SERPL-SCNC: 141 MMOL/L — SIGNIFICANT CHANGE UP (ref 135–145)
WBC # BLD: 14.8 K/UL — HIGH (ref 3.8–10.5)
WBC # FLD AUTO: 14.8 K/UL — HIGH (ref 3.8–10.5)

## 2018-09-15 PROCEDURE — 99497 ADVNCD CARE PLAN 30 MIN: CPT | Mod: 25

## 2018-09-15 PROCEDURE — 99233 SBSQ HOSP IP/OBS HIGH 50: CPT

## 2018-09-15 PROCEDURE — 99232 SBSQ HOSP IP/OBS MODERATE 35: CPT

## 2018-09-15 RX ORDER — CLOPIDOGREL BISULFATE 75 MG/1
75 TABLET, FILM COATED ORAL DAILY
Qty: 0 | Refills: 0 | Status: DISCONTINUED | OUTPATIENT
Start: 2018-09-15 | End: 2018-09-20

## 2018-09-15 RX ORDER — WARFARIN SODIUM 2.5 MG/1
5 TABLET ORAL ONCE
Qty: 0 | Refills: 0 | Status: COMPLETED | OUTPATIENT
Start: 2018-09-15 | End: 2018-09-15

## 2018-09-15 RX ADMIN — ALBUTEROL 2.5 MILLIGRAM(S): 90 AEROSOL, METERED ORAL at 13:05

## 2018-09-15 RX ADMIN — ALBUTEROL 2.5 MILLIGRAM(S): 90 AEROSOL, METERED ORAL at 19:47

## 2018-09-15 RX ADMIN — CLOPIDOGREL BISULFATE 75 MILLIGRAM(S): 75 TABLET, FILM COATED ORAL at 12:17

## 2018-09-15 RX ADMIN — Medication 650 MILLIGRAM(S): at 21:55

## 2018-09-15 RX ADMIN — Medication 200 MILLIGRAM(S): at 21:54

## 2018-09-15 RX ADMIN — Medication 1 DROP(S): at 05:24

## 2018-09-15 RX ADMIN — Medication 25 MILLIGRAM(S): at 17:22

## 2018-09-15 RX ADMIN — ALBUTEROL 2.5 MILLIGRAM(S): 90 AEROSOL, METERED ORAL at 07:15

## 2018-09-15 RX ADMIN — PANTOPRAZOLE SODIUM 40 MILLIGRAM(S): 20 TABLET, DELAYED RELEASE ORAL at 12:18

## 2018-09-15 RX ADMIN — Medication 1 DROP(S): at 17:22

## 2018-09-15 RX ADMIN — ATORVASTATIN CALCIUM 40 MILLIGRAM(S): 80 TABLET, FILM COATED ORAL at 21:54

## 2018-09-15 RX ADMIN — SODIUM CHLORIDE 50 MILLILITER(S): 9 INJECTION, SOLUTION INTRAVENOUS at 00:44

## 2018-09-15 RX ADMIN — Medication 0.12 MILLIGRAM(S): at 05:24

## 2018-09-15 RX ADMIN — PIPERACILLIN AND TAZOBACTAM 25 GRAM(S): 4; .5 INJECTION, POWDER, LYOPHILIZED, FOR SOLUTION INTRAVENOUS at 12:18

## 2018-09-15 RX ADMIN — PIPERACILLIN AND TAZOBACTAM 25 GRAM(S): 4; .5 INJECTION, POWDER, LYOPHILIZED, FOR SOLUTION INTRAVENOUS at 23:19

## 2018-09-15 RX ADMIN — Medication 200 MILLIGRAM(S): at 05:24

## 2018-09-15 RX ADMIN — WARFARIN SODIUM 5 MILLIGRAM(S): 2.5 TABLET ORAL at 21:55

## 2018-09-15 NOTE — PROGRESS NOTE ADULT - PROBLEM SELECTOR PLAN 1
pulm congestion  s/p PEG for dysphagia  oral and skin care  assist with ADL  tolerating room air  on emp ABX for asp pna  NEBS to help mobilize secretions  out of bed as tolerated  cvs regimen, on IVF, monitor vs and HD and Sat  keep MAP > 60  will follow and monitor

## 2018-09-15 NOTE — PROGRESS NOTE ADULT - PROBLEM SELECTOR PLAN 3
TIBURCIO on CKD3, likely 2/2 dehydration now worsening. Likely due to poor po intake and possible element of ATN due to hypotension in setting of hypovolemia.   improving, will d/c IVF as pt on tube feeding and water flushes.  -monitor BMP  Renal f/u

## 2018-09-15 NOTE — PROGRESS NOTE ADULT - PROBLEM SELECTOR PLAN 1
FTT, likely 2/2 poor PO intake, dysphagia, has severe malnutrition.  S/P PEG placement and on tube feeding, tolerating well. residual 10 ml  - PT consult recommending HCPT originally--much weaker today. PT to re-eval.  - Seen by cardiothoracic surgery Shailesh, who recommended PEG placement for now and outpatient follow-up with eventual surgical intervention for Zenker's diverticulum.

## 2018-09-15 NOTE — PROGRESS NOTE ADULT - SUBJECTIVE AND OBJECTIVE BOX
Date/Time Patient Seen:  		  Referring MD:   Data Reviewed	       Patient is a 85y old  Male who presents with a chief complaint of sob, dyspnea, difficulty walking, dysphagia (14 Sep 2018 22:32)  in bed  seen and examined  vs and meds reviewed      Subjective/HPI     PAST MEDICAL & SURGICAL HISTORY:  AF (atrial fibrillation): Cardioversion 5/2012  GERD (gastroesophageal reflux disease)  CHF (congestive heart failure)  CAD (coronary artery disease)  Glaucoma  HTN (Hypertension)  S/P ICD (internal cardiac defibrillator) procedure  H/O excision of Zenker's diverticulum  S/P Cataract Surgery - bilateral  s/p Angioplasty with Stent - 2010  History of Rotator Cuff Surgery Right and Left  S/P Cholecystectomy  S/P TURP (Transurethral Resection of Prostate)  S/P Prostatectomy - 2005  S/P Prostatectomy  S/P Prostatectomy        Medication list         MEDICATIONS  (STANDING):  ALBUTerol    0.083% 2.5 milliGRAM(s) Nebulizer every 6 hours  atorvastatin 40 milliGRAM(s) Oral at bedtime  dextrose 5% + sodium chloride 0.45%. 1000 milliLiter(s) (50 mL/Hr) IV Continuous <Continuous>  digoxin     Tablet 0.125 milliGRAM(s) Oral daily  influenza   Vaccine 0.5 milliLiter(s) IntraMuscular once  metoprolol tartrate 25 milliGRAM(s) Oral two times a day  pantoprazole  Injectable 40 milliGRAM(s) IV Push daily  piperacillin/tazobactam IVPB. 3.375 Gram(s) IV Intermittent every 12 hours  timolol 0.5% Solution 1 Drop(s) Both EYES two times a day    MEDICATIONS  (PRN):  acetaminophen    Suspension .. 650 milliGRAM(s) Enteral Tube every 6 hours PRN Mild Pain (1 - 3)  guaiFENesin    Syrup 200 milliGRAM(s) Oral every 6 hours PRN Cough         Vitals log        ICU Vital Signs Last 24 Hrs  T(C): 36.6 (15 Sep 2018 04:46), Max: 36.9 (14 Sep 2018 19:17)  T(F): 97.8 (15 Sep 2018 04:46), Max: 98.5 (14 Sep 2018 19:17)  HR: 76 (15 Sep 2018 07:15) (69 - 102)  BP: 95/69 (15 Sep 2018 04:46) (74/44 - 95/69)  BP(mean): --  ABP: --  ABP(mean): --  RR: 17 (15 Sep 2018 04:46) (17 - 19)  SpO2: 91% (15 Sep 2018 04:46) (91% - 100%)           Input and Output:  I&O's Detail    14 Sep 2018 07:01  -  15 Sep 2018 07:00  --------------------------------------------------------  IN:    dextrose 5% + sodium chloride 0.45%.: 950 mL    Enteral Tube Flush: 325 mL    Free Water: 180 mL    ns in tub fed  [id number]: 835 mL    Sodium Chloride 0.9% IV Bolus: 500 mL    Solution: 100 mL  Total IN: 2890 mL    OUT:    Indwelling Catheter - Urethral: 1050 mL  Total OUT: 1050 mL    Total NET: 1840 mL          Lab Data                        11.6   14.80 )-----------( 200      ( 15 Sep 2018 07:22 )             34.6     09-14    142  |  108  |  79<H>  ----------------------------<  125<H>  4.7   |  25  |  2.50<H>    Ca    9.1      14 Sep 2018 07:17  Phos  2.9     09-14  Mg     2.3     09-14              Review of Systems	      Objective     Physical Examination    heart s1s2  lung dec BS  abd soft  PEG in    Pertinent Lab findings & Imaging      Sherly:  NO   Adequate UO     I&O's Detail    14 Sep 2018 07:01  -  15 Sep 2018 07:00  --------------------------------------------------------  IN:    dextrose 5% + sodium chloride 0.45%.: 950 mL    Enteral Tube Flush: 325 mL    Free Water: 180 mL    ns in tub fed  [id number]: 835 mL    Sodium Chloride 0.9% IV Bolus: 500 mL    Solution: 100 mL  Total IN: 2890 mL    OUT:    Indwelling Catheter - Urethral: 1050 mL  Total OUT: 1050 mL    Total NET: 1840 mL               Discussed with:     Cultures:	        Radiology

## 2018-09-15 NOTE — PROGRESS NOTE ADULT - SUBJECTIVE AND OBJECTIVE BOX
Interval Events: s/p EGD with PEG placement  Pt admits to mild incisional pain when he coughs  PEG site c/d/i  Tolerating tube feeds   no bm today     MEDICATIONS  (STANDING):  ALBUTerol    0.083% 2.5 milliGRAM(s) Nebulizer every 6 hours  dextrose 5% + sodium chloride 0.45%. 1000 milliLiter(s) (50 mL/Hr) IV Continuous <Continuous>  digoxin  Injectable 0.25 milliGRAM(s) IV Push daily  guaiFENesin  milliGRAM(s) Oral every 12 hours  influenza   Vaccine 0.5 milliLiter(s) IntraMuscular once  metoprolol tartrate Injectable 5 milliGRAM(s) IV Push every 6 hours  morphine   Solution 2 milliGRAM(s) Oral every 6 hours  pantoprazole  Injectable 40 milliGRAM(s) IV Push daily  piperacillin/tazobactam IVPB. 3.375 Gram(s) IV Intermittent every 12 hours  timolol 0.5% Solution 1 Drop(s) Both EYES two times a day    MEDICATIONS  (PRN):      Allergies    No Known Allergies    Intolerances        Review of Systems:    General:  No wt loss, fevers, chills, night sweats,fatigue,   Eyes:  Good vision, no reported pain  ENT:  No sore throat, pain, runny nose, dysphagia  CV:  No pain, palpitations, hypo/hypertension  Resp:  No dyspnea, cough, tachypnea, wheezing  GI:  + mild incisional pain,  No nausea, No vomiting, No diarrhea, No constipation, No weight loss, No fever, No pruritis, No rectal bleeding, No melena, No dysphagia  :  No pain, bleeding, incontinence, nocturia  Muscle:  No pain, weakness  Neuro:  No weakness, tingling, memory problems  Psych:  No fatigue, insomnia, mood problems, depression  Endocrine:  No polyuria, polydypsia, cold/heat intolerance  Heme:  No petechiae, ecchymosis, easy bruisability  Skin:  No rash, tattoos, scars, edema      Vital Signs Last 24 Hrs  Vital Signs Last 24 Hrs  T(C): 36.5 (15 Sep 2018 08:16), Max: 36.9 (14 Sep 2018 19:17)  T(F): 97.7 (15 Sep 2018 08:16), Max: 98.5 (14 Sep 2018 19:17)  HR: 71 (15 Sep 2018 08:16) (69 - 102)  BP: 96/59 (15 Sep 2018 08:16) (74/44 - 96/59)  BP(mean): --  RR: 18 (15 Sep 2018 08:16) (17 - 19)  SpO2: 99% (15 Sep 2018 08:16) (91% - 100%)    PHYSICAL EXAM:    Constitutional: NAD, well-developed  HEENT: EOMI, throat clear  Neck: No LAD, supple  Respiratory: CTA and P  Cardiovascular: S1 and S2, RRR, no M  Gastrointestinal: BS+, soft, NT/ND, neg HSM, PEG c/d/i  Extremities: No peripheral edema, neg clubbing, cyanosis  Vascular: 2+ peripheral pulses  Neurological: A/O x 3, no focal deficits  Psychiatric: Normal mood, normal affect  Skin: No rashes      LABS:                                           11.6   14.80 )-----------( 200      ( 15 Sep 2018 07:22 )             34.6     09-15    141  |  108  |  77<H>  ----------------------------<  187<H>  4.3   |  25  |  2.30<H>    Ca    8.4<L>      15 Sep 2018 07:22  Phos  2.9     09-14  Mg     2.3     09-14                 RADIOLOGY & ADDITIONAL TESTS:

## 2018-09-15 NOTE — CHART NOTE - NSCHARTNOTEFT_GEN_A_CORE
Assessment: Pt seen for nutrition follow up. Per chart, pt is 86 y/o M with PMH of PAF on coumadin, CAD S/P remote PCI (1 stent), Luu's esophagus, Zenker's diverticulum, HTN, reflux, hypercholesterolemia, p/w weakness and dyspnea. Hospital course inclusive of failed MBS 9/11, PEG placement 9/13. Interviewed with wife at bedside. Pt denies N/V, diarrhea, constipation. Of note pt with gastric residuals on 9/14: 70 ml, 9/15: 40 ml during which tube feedings momentarily held for 30 mins. Feeds currently infusing at 60 ml/hr.    Factors impacting intake: [ ] none [ ] nausea  [ ] vomiting [ ] diarrhea [ ] constipation  [X]chewing problems [X] swallowing issues  [X] other: admitted with dysphagia S/P PEG placement 9/13    Diet: NPO with Tube Feed:   Tube Feeding Modality: Gastrostomy  Jevity 1.2 Rajendra  Total Volume for 24 Hours (mL): 1680  Continuous  Starting Tube Feed Rate {mL per Hour}: 10  Increase Tube Feed Rate by (mL): 10     Every hour  Until Goal Tube Feed Rate (mL per Hour): 70  Tube Feed Duration (in Hours): 24  Tube Feed Start Time: 10:00 (09-14-18 @ 09:49)    Intake: Jevity 1.2 via PEG currently infusing @ 60 ml/hr x 24 hours     Current Weight: 67.5 kg (9/13, bed wt)    Pertinent Medications: MEDICATIONS  (STANDING):  ALBUTerol    0.083% 2.5 milliGRAM(s) Nebulizer every 6 hours  atorvastatin 40 milliGRAM(s) Oral at bedtime  clopidogrel Tablet 75 milliGRAM(s) Oral daily  digoxin     Tablet 0.125 milliGRAM(s) Oral daily  influenza   Vaccine 0.5 milliLiter(s) IntraMuscular once  metoprolol tartrate 25 milliGRAM(s) Oral two times a day  pantoprazole  Injectable 40 milliGRAM(s) IV Push daily  piperacillin/tazobactam IVPB. 3.375 Gram(s) IV Intermittent every 12 hours  timolol 0.5% Solution 1 Drop(s) Both EYES two times a day    MEDICATIONS  (PRN):  acetaminophen    Suspension .. 650 milliGRAM(s) Enteral Tube every 6 hours PRN Mild Pain (1 - 3)  guaiFENesin    Syrup 200 milliGRAM(s) Oral every 6 hours PRN Cough    Pertinent Labs: 09-15 Na141 mmol/L Glu 187 mg/dL<H> K+ 4.3 mmol/L Cr  2.30 mg/dL<H> BUN 77 mg/dL<H> 09-14 Phos 2.9 mg/dL 09-10 Alb 3.0 g/dL<L>     CAPILLARY BLOOD GLUCOSE      Skin: No edema or pressure injuries noted    Estimated Needs:   [X] no change since previous assessment  [ ] recalculated:     Previous Nutrition Diagnosis:   [X] Malnutrition     Nutrition Diagnosis is [X] ongoing  - being addressed with enteral nutrition     New Nutrition Diagnosis: [X] not applicable       Interventions:   Recommend  [ ] Change Diet To:  [ ] Nutrition Supplement  [X] Nutrition Support - Continue tube feedings of Jevity 1.2 via PEG. Increase rate to 70 ml/hr x 24 hours as tolerated to provide 2016 kcal per day, 66 grams of protein) per current wt of 67.5 kg.  [X] Other: Monitor tube feeding tolerance and maintain aspiration precautions (elevate HOB).    Monitoring and Evaluation  [ ] PO intake [ x ] Tolerance to diet prescription [ x ] weights [ x ] labs[ x ] follow up per protocol  [X] other: RD remains available for further nutrition intervention as needed. Assessment: Pt seen for nutrition follow up. Per chart, pt is 84 y/o M with PMH of PAF on coumadin, CAD S/P remote PCI (1 stent), Luu's esophagus, Zenker's diverticulum, HTN, reflux, hypercholesterolemia, p/w weakness and dyspnea. Hospital course inclusive of failed MBS 9/11, PEG placement 9/13. Interviewed with wife at bedside. Pt denies N/V, diarrhea, constipation. Of note pt with gastric residuals on 9/14: 70 ml, 9/15: 40 ml during which tube feedings momentarily held for 30 mins. Feeds currently infusing at 60 ml/hr.    Factors impacting intake: [ ] none [ ] nausea  [ ] vomiting [ ] diarrhea [ ] constipation  [X]chewing problems [X] swallowing issues  [X] other: admitted with dysphagia S/P PEG placement 9/13    Diet: NPO with Tube Feed:   Tube Feeding Modality: Gastrostomy  Jevity 1.2 Rajendra  Total Volume for 24 Hours (mL): 1680  Continuous  Starting Tube Feed Rate {mL per Hour}: 10  Increase Tube Feed Rate by (mL): 10     Every hour  Until Goal Tube Feed Rate (mL per Hour): 70  Tube Feed Duration (in Hours): 24  Tube Feed Start Time: 10:00 (09-14-18 @ 09:49)    Intake: Jevity 1.2 via PEG currently infusing @ 60 ml/hr x 24 hours     Current Weight: 67.5 kg (9/13, bed wt)    Pertinent Medications: MEDICATIONS  (STANDING):  ALBUTerol    0.083% 2.5 milliGRAM(s) Nebulizer every 6 hours  atorvastatin 40 milliGRAM(s) Oral at bedtime  clopidogrel Tablet 75 milliGRAM(s) Oral daily  digoxin     Tablet 0.125 milliGRAM(s) Oral daily  influenza   Vaccine 0.5 milliLiter(s) IntraMuscular once  metoprolol tartrate 25 milliGRAM(s) Oral two times a day  pantoprazole  Injectable 40 milliGRAM(s) IV Push daily  piperacillin/tazobactam IVPB. 3.375 Gram(s) IV Intermittent every 12 hours  timolol 0.5% Solution 1 Drop(s) Both EYES two times a day    MEDICATIONS  (PRN):  acetaminophen    Suspension .. 650 milliGRAM(s) Enteral Tube every 6 hours PRN Mild Pain (1 - 3)  guaiFENesin    Syrup 200 milliGRAM(s) Oral every 6 hours PRN Cough    Pertinent Labs: 09-15 Na141 mmol/L Glu 187 mg/dL<H> K+ 4.3 mmol/L Cr  2.30 mg/dL<H> BUN 77 mg/dL<H> 09-14 Phos 2.9 mg/dL 09-10 Alb 3.0 g/dL<L>     CAPILLARY BLOOD GLUCOSE      Skin: No edema or pressure injuries noted    Estimated Needs:   [X] no change since previous assessment  [ ] recalculated:     Previous Nutrition Diagnosis:   [X] Malnutrition (severe, chronic)    Nutrition Diagnosis is [X] ongoing  - being addressed with enteral nutrition     New Nutrition Diagnosis: [X] not applicable       Interventions:   Recommend  [ ] Change Diet To:  [ ] Nutrition Supplement  [X] Nutrition Support - Continue tube feedings of Jevity 1.2 via PEG. Increase rate to 70 ml/hr x 24 hours as tolerated to provide 2016 kcal per day, 66 grams of protein) per current wt of 67.5 kg.  [X] Other: Monitor tube feeding tolerance and maintain aspiration precautions (elevate HOB).    Monitoring and Evaluation  [ ] PO intake [ x ] Tolerance to diet prescription [ x ] weights [ x ] labs[ x ] follow up per protocol  [X] other: RD remains available for further nutrition intervention as needed. Assessment: Pt seen for nutrition follow up. Per chart, pt is 84 y/o M with PMH of PAF on coumadin, CAD S/P remote PCI (1 stent), Luu's esophagus, Zenker's diverticulum, HTN, reflux, hypercholesterolemia, p/w weakness and dyspnea. Hospital course inclusive of failed MBS 9/11, PEG placement 9/13. Interviewed with wife at bedside. Pt denies N/V, diarrhea, constipation. Last BM per chart 9/14. Of note pt with gastric residuals on 9/14: 70 ml, 9/15: 40 ml during which tube feedings momentarily held for 30 mins. Feeds currently infusing at 60 ml/hr.    Factors impacting intake: [ ] none [ ] nausea  [ ] vomiting [ ] diarrhea [ ] constipation  [X]chewing problems [X] swallowing issues  [X] other: admitted with dysphagia S/P PEG placement 9/13    Diet: NPO with Tube Feed:   Tube Feeding Modality: Gastrostomy  Jevity 1.2 Rajendra  Total Volume for 24 Hours (mL): 1680  Continuous  Starting Tube Feed Rate {mL per Hour}: 10  Increase Tube Feed Rate by (mL): 10     Every hour  Until Goal Tube Feed Rate (mL per Hour): 70  Tube Feed Duration (in Hours): 24  Tube Feed Start Time: 10:00 (09-14-18 @ 09:49)    Intake: Jevity 1.2 via PEG currently infusing @ 60 ml/hr x 24 hours     Current Weight: 67.5 kg (9/13, bed wt)    Pertinent Medications: MEDICATIONS  (STANDING):  ALBUTerol    0.083% 2.5 milliGRAM(s) Nebulizer every 6 hours  atorvastatin 40 milliGRAM(s) Oral at bedtime  clopidogrel Tablet 75 milliGRAM(s) Oral daily  digoxin     Tablet 0.125 milliGRAM(s) Oral daily  influenza   Vaccine 0.5 milliLiter(s) IntraMuscular once  metoprolol tartrate 25 milliGRAM(s) Oral two times a day  pantoprazole  Injectable 40 milliGRAM(s) IV Push daily  piperacillin/tazobactam IVPB. 3.375 Gram(s) IV Intermittent every 12 hours  timolol 0.5% Solution 1 Drop(s) Both EYES two times a day    MEDICATIONS  (PRN):  acetaminophen    Suspension .. 650 milliGRAM(s) Enteral Tube every 6 hours PRN Mild Pain (1 - 3)  guaiFENesin    Syrup 200 milliGRAM(s) Oral every 6 hours PRN Cough    Pertinent Labs: 09-15 Na141 mmol/L Glu 187 mg/dL<H> K+ 4.3 mmol/L Cr  2.30 mg/dL<H> BUN 77 mg/dL<H> 09-14 Phos 2.9 mg/dL 09-10 Alb 3.0 g/dL<L>     CAPILLARY BLOOD GLUCOSE      Skin: No edema or pressure injuries noted    Estimated Needs:   [X] no change since previous assessment  [ ] recalculated:     Previous Nutrition Diagnosis:   [X] Malnutrition (severe, chronic)    Nutrition Diagnosis is [X] ongoing  - being addressed with enteral nutrition     New Nutrition Diagnosis: [X] not applicable       Interventions:   Recommend  [ ] Change Diet To:  [ ] Nutrition Supplement  [X] Nutrition Support - Continue tube feedings of Jevity 1.2 via PEG. Increase rate to 70 ml/hr x 24 hours as tolerated to provide 2016 kcal per day, 66 grams of protein) per current wt of 67.5 kg.  [X] Other: Monitor tube feeding tolerance and maintain aspiration precautions (elevate HOB).    Monitoring and Evaluation  [ ] PO intake [ x ] Tolerance to diet prescription [ x ] weights [ x ] labs[ x ] follow up per protocol  [X] other: RD remains available for further nutrition intervention as needed.

## 2018-09-15 NOTE — PROVIDER CONTACT NOTE (OTHER) - DATE AND TIME:
15-Sep-2018 06:45
11-Sep-2018 00:05
13-Sep-2018 09:50
14-Sep-2018 09:56
14-Sep-2018 12:24
14-Sep-2018 20:15

## 2018-09-15 NOTE — PROVIDER CONTACT NOTE (OTHER) - BACKGROUND
new peg placed 9/13;tube feedings started 9/14    jevity 1.2 at 60ml;goal 70ml
1st troponin .245, presently on LR at 75 cc/hr
bladder scan 459mlpt unable to void
pt has biv/acid

## 2018-09-15 NOTE — PROGRESS NOTE ADULT - ASSESSMENT
84 M of PAF AC, CAD s/p remote PCI Luu's esophagus, a repaired Zenker's diverticulum, hypertension, reflux, hypercholesterolemia, glaucoma, moderate NICM, HCM s/p biV ICD p/w dyspnea.    - CT scan not consistent with severe volume overload; small pericardial and pleural effusions are noted.  - Likely some component of aspiration  - Please continue to maintain strict I/Os, monitor daily weights, Cr, and K.  - Ned are elevated but likely from demand ischemia  -  plavix restarted, ( ok w/ GI as per notes)  - cont statin   - BP cont to be soft ( SBP 90's)   - Cont Metoprolol with parameters 2/2 soft BP  - Cont Digoxin, digoxin level 1.0 on 9/11  - Dose Coumadin today ( as per GI recs)  with goal inr 2-3.  Watch for bleeding   - Trend CBC as he is at risk for acute blood loss   - Failed barium swallow test  - Further cardiac workup will depend on clinical course.   - TIBURCIO 2/2 urinary obstruction with Dubois placed - renal following, creat improving down to 2.30 today  - All other workup per primary team. Will follow.  - D/C planning to [facility] as per primary team/CM    Erendira Aguirre, , NP-C  Cardiology 84 M of PAF AC, CAD s/p remote PCI Luu's esophagus, a repaired Zenker's diverticulum, hypertension, reflux, hypercholesterolemia, glaucoma, moderate NICM, HCM s/p biV ICD p/w dyspnea.  CT scan not consistent with severe volume overload; small pericardial and pleural effusions are noted.  Likely some component of aspiration  Ned are elevated but likely from demand ischemia;  BP cont to be soft ( SBP 90's)       Recommend:  - Please continue to maintain strict I/Os, monitor daily weights, Cr, and K.  -  plavix restarted, ( ok w/ GI as per notes)  - cont statin   - Cont Metoprolol with parameters 2/2 soft BP  - Cont Digoxin, digoxin level 1.0 on 9/11  - Dose Coumadin today ( as per GI recs)  with goal inr 2-3.  Watch for bleeding   - Trend CBC as he is at risk for acute blood loss   - Failed barium swallow test  - Further cardiac workup will depend on clinical course.   - TIBURCIO 2/2 urinary obstruction with Dubois placed - renal following, creat improving down to 2.30 today  - All other workup per primary team. Will follow.  - D/C planning to [facility] as per primary team/CM    Erendira Aguirre, , NP-C  Cardiology

## 2018-09-15 NOTE — PROGRESS NOTE ADULT - PROBLEM SELECTOR PLAN 10
Patient on home coumadin, restarted
Patient on home coumadin but is supratherapeutic at this time.   IMPROVE VTE Individual Risk Assessment        RISK                                                          Points  [  ] Previous VTE                                                3  [  ] Thrombophilia                                             2  [  ] Lower limb paralysis                                   2        (unable to hold up >15 seconds)    [x  ] Current Cancer                                             2         (within 6 months)  [  ] Immobilization > 24 hrs                              1  [  ] ICU/CCU stay > 24 hours                             1  [ x ] Age > 60                                                         1    IMPROVE VTE Score: 3
Patient on home coumadin but, held at this time.  IMPROVE VTE Individual Risk Assessment        RISK                                                          Points  [  ] Previous VTE                                                3  [  ] Thrombophilia                                             2  [  ] Lower limb paralysis                                   2        (unable to hold up >15 seconds)    [x  ] Current Cancer                                             2         (within 6 months)  [  ] Immobilization > 24 hrs                              1  [  ] ICU/CCU stay > 24 hours                             1  [ x ] Age > 60                                                         1    IMPROVE VTE Score: 3

## 2018-09-15 NOTE — PROVIDER CONTACT NOTE (OTHER) - ACTION/TREATMENT ORDERED:
meds given and iv started. repeat blood sugar after one hour
no new orders received
place butts and bolus ns 500ml x1
tylenol via peg
Dr. Milton to review chart with regards to low BP, call Dr. Powers about troponin, left message
md to come to bedside to evaluate pt

## 2018-09-15 NOTE — PROGRESS NOTE ADULT - PROBLEM SELECTOR PLAN 7
HTN, chronic.   - On home Aldactone- will hold as per cardio recs  - On home Metoprolol succinate 25mg BID.

## 2018-09-15 NOTE — PROVIDER CONTACT NOTE (OTHER) - ASSESSMENT
tube feeding HELD for 30minutes after meds given per Gtube  case reviewed with Dr Milton this am
spb 90s
denies chest pain, dizziness or palpitations
pt denied palpitations/bp low/pt asymptomatic to bp

## 2018-09-15 NOTE — PROGRESS NOTE ADULT - SUBJECTIVE AND OBJECTIVE BOX
[facility] -- Cory Fragoso, Aditya Bah, Alexei Tipton Savella  Office # [phone]      Follow Up:  AF, CHF, Dyspnea    Subjective/Observations:  Seen and examined.  Resting in bed comfortably.  NAD denies cp, sob or palpitations.  Tolerating TF via peg.  No signs of orthopnea or PND.  NAD.     REVIEW OF SYSTEMS: All other review of systems is negative unless indicated above    PAST MEDICAL & SURGICAL HISTORY:  AF (atrial fibrillation): Cardioversion 5/2012  GERD (gastroesophageal reflux disease)  CHF (congestive heart failure)  CAD (coronary artery disease)  Glaucoma  HTN (Hypertension)  S/P ICD (internal cardiac defibrillator) procedure  H/O excision of Zenker's diverticulum  S/P Cataract Surgery - bilateral  s/p Angioplasty with Stent - 2010  History of Rotator Cuff Surgery Right and Left  S/P Cholecystectomy  S/P TURP (Transurethral Resection of Prostate)  S/P Prostatectomy - 2005      MEDICATIONS  (STANDING):  ALBUTerol    0.083% 2.5 milliGRAM(s) Nebulizer every 6 hours  atorvastatin 40 milliGRAM(s) Oral at bedtime  digoxin     Tablet 0.125 milliGRAM(s) Oral daily  influenza   Vaccine 0.5 milliLiter(s) IntraMuscular once  metoprolol tartrate 25 milliGRAM(s) Oral two times a day  pantoprazole  Injectable 40 milliGRAM(s) IV Push daily  piperacillin/tazobactam IVPB. 3.375 Gram(s) IV Intermittent every 12 hours  timolol 0.5% Solution 1 Drop(s) Both EYES two times a day    MEDICATIONS  (PRN):  acetaminophen    Suspension .. 650 milliGRAM(s) Enteral Tube every 6 hours PRN Mild Pain (1 - 3)  guaiFENesin    Syrup 200 milliGRAM(s) Oral every 6 hours PRN Cough      Allergies    No Known Allergies    Intolerances            Vital Signs Last 24 Hrs  T(C): 36.5 (15 Sep 2018 08:16), Max: 36.9 (14 Sep 2018 19:17)  T(F): 97.7 (15 Sep 2018 08:16), Max: 98.5 (14 Sep 2018 19:17)  HR: 71 (15 Sep 2018 08:16) (69 - 102)  BP: 96/59 (15 Sep 2018 08:16) (74/44 - 96/59)  BP(mean): --  RR: 18 (15 Sep 2018 08:16) (17 - 19)  SpO2: 99% (15 Sep 2018 08:16) (91% - 100%)    I&O's Summary    14 Sep 2018 07:01  -  15 Sep 2018 07:00  --------------------------------------------------------  IN: 2890 mL / OUT: 1050 mL / NET: 1840 mL    15 Sep 2018 07:01  -  15 Sep 2018 10:37  --------------------------------------------------------  IN: 490 mL / OUT: 0 mL / NET: 490 mL          PHYSICAL EXAM:  TELE: Vpaced 70's, 10 beats of Vtach at 20:00 on 9/14- no further ectopy reported   Constitutional: NAD, awake and alert, well-developed  HEENT: Moist Mucous Membranes, Anicteric  Pulmonary: Non-labored, breath sounds are clear bilaterally, No wheezing, rales or rhonchi  Cardiovascular: Regular, S1 and S2, No murmurs, rubs, gallops or clicks  Gastrointestinal: Bowel Sounds present, soft, nontender. TF progressing   Lymph: No peripheral edema. No lymphadenopathy.  Skin: No visible rashes or ulcers.  Psych:  Mood & affect appropriate    LABS: All Labs Reviewed:                        11.6   14.80 )-----------( 200      ( 15 Sep 2018 07:22 )             34.6                         12.1   16.17 )-----------( 249      ( 14 Sep 2018 07:17 )             37.0                         12.7   13.34 )-----------( 260      ( 13 Sep 2018 07:29 )             37.5     15 Sep 2018 07:22    141    |  108    |  77     ----------------------------<  187    4.3     |  25     |  2.30   14 Sep 2018 07:17    142    |  108    |  79     ----------------------------<  125    4.7     |  25     |  2.50   13 Sep 2018 07:29    144    |  109    |  64     ----------------------------<  49     5.2     |  26     |  1.90     Ca    8.4        15 Sep 2018 07:22  Ca    9.1        14 Sep 2018 07:17  Ca    9.0        13 Sep 2018 07:29  Phos  2.9       14 Sep 2018 22:07  Phos  3.9       14 Sep 2018 07:17  Mg     2.3       14 Sep 2018 22:07  Mg     2.5       14 Sep 2018 07:17      PT/INR - ( 15 Sep 2018 07:22 )   PT: 17.8 sec;   INR: 1.62 ratio             < from: 12 Lead ECG (09.10.18 @ 17:39) >  Ventricular Rate 70 BPM    Atrial Rate 70 BPM    QRS Duration 154 ms    Q-T Interval 436 ms    QTC Calculation(Bezet) 470 ms    R Axis -53 degrees    T Axis -78 degrees    Diagnosis Line Ventricular-paced rhythm  Confirmed by SARITA TIPTON ([id number])on 9/11/2018 11:32:55 AM    < end of copied text >       < from: TTE Echo Doppler w/o Cont (05.20.15 @ 09:36) >   EXAM:  ECHO TTE W/O CON COMP W/DOPPLR           PROCEDURE DATE:  05/20/2015      INTERPRETATION:  Ordering Physician: PATITO MARTINEZ    Indication: Congestive heart failure    Technician: IGOR    Study Quality: Fair     Height: 190.5 cm  Weight: 95 kg  Blood Pressure: 102/60    MEASUREMENTS  IVS: 2.0 cm  PWT: 2.0 cm  LA: 4.2 cm  Aortic Root: 3.6 cm  LVIDd: 4.3 cm  LVIDs: 3.2 cm    LVEF: Approximately 40%    FINDINGS  Left Ventricle: Normal left ventricular size with moderate global   systolicdysfunction. LVEF estimated at 40%. Severe concentric left   ventricular hypertrophy.  Right Ventricle: Right ventricular enlargement with decreased function. A   pacing wire is seen in the right heart.  Left Atrium: Mild left atrial enlargement.  Right Atrium: Right atrial enlargement. The IVC is dilated and collapses   less than 50% with inspiration, suggesting elevated right atrial pressure.  Mitral Valve: Normal mitral valve. Mild mitral regurgitation.  Aortic Valve: Aortic valve sclerosis.  Tricuspid Valve: Normal tricuspid valve. Moderate tricuspid   regurgitation. Pulmonary artery systolic pressure estimated at 51 mmHg,   assuming a right atrial pressure of 15 mmHg, consistent with moderate   pulmonary hypertension.  Pulmonic Valve: Normal pulmonic valve.  Pericardium/Pleura: Small pericardial effusion. Bilateral pleural   effusions.      CONCLUSIONS:  1. Normal left ventricular size with moderate global systolic   dysfunction. LVEF estimated at 40%.   2. Severe concentric left ventricular hypertrophy.  3. Right ventricular enlargement with decreased function.   4. Biatrial enlargement.  5. Mild mitral regurgitation.  6. Moderate tricuspid regurgitation.   7. Moderate pulmonary hypertension.  8. Small pericardial effusion. Bilateral pleural effusions.              MAYITO CRAMER M.D., ATTENDING CARDIOLOGIST  This examination was interpreted on: May 21 2015 12:23P.  This document   has been electronically signed. May 21 2015 12:27P.    < end of copied text >        < from: CT Chest No Cont (09.11.18 @ 07:48) >  EXAM:  CT CHEST                            PROCEDURE DATE:  09/11/2018          INTERPRETATION:  CLINICAL INFORMATION: Dyspnea    COMPARISON: 2011    PROCEDURE:   CT of the Chest was performed without intravenous contrast.  Sagittal and coronal reformats were performed.      FINDINGS:    CHEST:     CHEST WALL AND LOWER NECK: Within normal limits  MEDIASTINUM AND DARINEL: Within normal limits  HEART: Cardiomegaly, pacemaker. Small pericardial effusion. Extensive   coronary calcifications. This workstation  VESSELS: Extensive calcifications.  LUNG AND LARGE AIRWAYS: Left lower lobe endobronchial mucoid impaction.   Nodular and tree-in-bud opacities left lower lobe, new from 2011. Small   pleural effusions, improved. Evidence of prior wedge resection right   lung. Bilateral segmental atelectasis.  VISUALIZED UPPER ABDOMEN: Within normal limits.  BONES: Spinal ankylosis.    IMPRESSION: Left lower lobe endobronchial mucoid impaction with   underlying airspace opacities, probably pneumonia, correlate for   aspiration. Follow-up to resolution.    Small pleural effusions. Small pericardial effusion.

## 2018-09-15 NOTE — PROVIDER CONTACT NOTE (OTHER) - RECOMMENDATIONS
Rn reviewed case and discussed with MD possibly starting Reglan to increase gastric motility
d50  half vial ivp and iv d5 .45ns  50cc/h
do not give morphine and place t/o for tylenol
none
orders for 12 lead ekg

## 2018-09-15 NOTE — PROGRESS NOTE ADULT - SUBJECTIVE AND OBJECTIVE BOX
HPI:  85 years old with history of PAF on coumadin (5mg x5 days and 2.5mg x2 days/week), CAD s/p remote PCI (1 stent), Luu's esophagus, a repaired Zenker's diverticulum, hypertension, reflux, hypercholesterolemia, glaucoma, moderate NICM, HCM s/p biV ICD p/w weakness and dyspnea. He has been complaining of more difficulty swallowing x a couple of days. He cannot swallow water at this point. He reports losing 18lbs since May but admits he has no appetite. He notes that he has gotten more dyspneic recently and can only walk a few feet. Despite using a cane, he reports difficulty walking. Recently he had his aldactone reduced by half because of weakness by Dr. Burgess. His PPM was recently interrogated (2 weeks ago). He has mild lower ext edema that is unchanged. Additionally, he reports that he has a small wound on his 2nd digit of right foot that is being managed by outpatient podiatrist. Denies any   palpitations, PND, orthopnea, near syncope, syncope,  stroke like symptoms, pain with swallowing.     INTERVAL EVENTS:   Patient seen and examined. No overnight events. Feels ok today, but complains some discomfort at new PEG site. He denies any chest pain, SOB, palpitations. No N/V/D.  tolertaing tube feeding.    REVIEW OF SYSTEMS:    CONSTITUTIONAL: No weakness, fevers or chills  EYES/ENT: No visual changes, no throat pain   RESPIRATORY: + cough, wheezing, hemoptysis; No shortness of breath  CARDIOVASCULAR: No chest pain or palpitations  GASTROINTESTINAL: No abdominal pain, nausea, vomiting, or hematemesis; No diarrhea or constipation. No melena or hematochezia.  GENITOURINARY: No dysuria, frequency or hematuria  NEUROLOGICAL: No dizziness, numbness, or weakness  SKIN: No itching, burning, rashes, or lesions   All other review of systems is negative unless indicated above.    Vital Signs Last 24 Hrs  T(C): 36.3 (15 Sep 2018 12:11), Max: 36.9 (14 Sep 2018 19:17)  T(F): 97.4 (15 Sep 2018 12:11), Max: 98.5 (14 Sep 2018 19:17)  HR: 84 (15 Sep 2018 13:05) (69 - 90)  BP: 91/53 (15 Sep 2018 12:11) (74/44 - 96/59)  BP(mean): --  RR: 18 (15 Sep 2018 12:11) (17 - 19)  SpO2: 97% (15 Sep 2018 12:11) (91% - 100%)    PHYSICAL EXAM:     GENERAL: no acute distress  HEENT: NC/AT, EOMI, neck supple, MMM, large wound with eschar on scalp.  RESPIRATORY: good air entry bilaterally, upper airway transmitted noise, no wheeze, decreased BS at bases.  CARDIOVASCULAR: irregular, no murmurs, gallops, rubs  ABDOMINAL: soft, non-tender, non-distended, positive bowel sounds, PEG+ with dressing  EXTREMITIES: no clubbing, cyanosis, or edema  NEUROLOGICAL: alert and oriented x 3, non-focal  SKIN: no rashes or lesions   MUSCULOSKELETAL: no gross joint deformity    LABS:                        11.6   14.80 )-----------( 200      ( 15 Sep 2018 07:22 )             34.6     15 Sep 2018 07:22    141    |  108    |  77     ----------------------------<  187    4.3     |  25     |  2.30     Ca    8.4        15 Sep 2018 07:22  Phos  2.9       14 Sep 2018 22:07  Mg     2.3       14 Sep 2018 22:07      PT/INR - ( 15 Sep 2018 07:22 )   PT: 17.8 sec;   INR: 1.62 ratio             CAPILLARY BLOOD GLUCOSE        UCx       RADIOLOGY & ADDITIONAL TESTS:          MEDICATIONS  (STANDING):  ALBUTerol    0.083% 2.5 milliGRAM(s) Nebulizer every 6 hours  dextrose 5% + sodium chloride 0.45%. 1000 milliLiter(s) (50 mL/Hr) IV Continuous <Continuous>  digoxin  Injectable 0.25 milliGRAM(s) IV Push daily  guaiFENesin  milliGRAM(s) Oral every 12 hours  influenza   Vaccine 0.5 milliLiter(s) IntraMuscular once  metoprolol tartrate Injectable 5 milliGRAM(s) IV Push every 6 hours  morphine   Solution 2 milliGRAM(s) Oral every 6 hours  pantoprazole  Injectable 40 milliGRAM(s) IV Push daily  piperacillin/tazobactam IVPB. 3.375 Gram(s) IV Intermittent every 12 hours  timolol 0.5% Solution 1 Drop(s) Both EYES two times a day    MEDICATIONS  (PRN):  acetaminophen    Suspension .. 650 milliGRAM(s) Enteral Tube every 6 hours PRN Mild Pain (1 - 3)

## 2018-09-15 NOTE — PROGRESS NOTE ADULT - PROBLEM SELECTOR PLAN 1
s/p EGD with PEG placement 9/14. Feeds ordered to start today  PEG care  monitor residuals   hx of zenyvonne sp remote repair  ct chest reviewed  ivf as needed  aspiration precautions, elevate hob  ppi iv qd  nutrition recs appreciated  further plan of care to be dw attg  can restart anticoagulation tomorrow 9/15  will follow

## 2018-09-15 NOTE — PROGRESS NOTE ADULT - ASSESSMENT
85 years old with history of HTN, CAD, CHF, PPM, AF now admitted with wt loss, difficulty swallowing.   Now he is S/P PEG and is on tube feeds. He developed TIBURCIO, which is most likely due to pre renal azotemia and/or Urinary retention.   he is now off of IVF  renal indices are slightly better. will follow.

## 2018-09-16 LAB
ANION GAP SERPL CALC-SCNC: 9 MMOL/L — SIGNIFICANT CHANGE UP (ref 5–17)
BUN SERPL-MCNC: 80 MG/DL — HIGH (ref 7–23)
CALCIUM SERPL-MCNC: 8.4 MG/DL — LOW (ref 8.5–10.1)
CHLORIDE SERPL-SCNC: 107 MMOL/L — SIGNIFICANT CHANGE UP (ref 96–108)
CO2 SERPL-SCNC: 26 MMOL/L — SIGNIFICANT CHANGE UP (ref 22–31)
CREAT SERPL-MCNC: 2.2 MG/DL — HIGH (ref 0.5–1.3)
GLUCOSE SERPL-MCNC: 106 MG/DL — HIGH (ref 70–99)
HCT VFR BLD CALC: 33.1 % — LOW (ref 39–50)
HGB BLD-MCNC: 11.2 G/DL — LOW (ref 13–17)
INR BLD: 2.35 RATIO — HIGH (ref 0.88–1.16)
MCHC RBC-ENTMCNC: 32.9 PG — SIGNIFICANT CHANGE UP (ref 27–34)
MCHC RBC-ENTMCNC: 33.8 GM/DL — SIGNIFICANT CHANGE UP (ref 32–36)
MCV RBC AUTO: 97.4 FL — SIGNIFICANT CHANGE UP (ref 80–100)
NRBC # BLD: 0 /100 WBCS — SIGNIFICANT CHANGE UP (ref 0–0)
PLATELET # BLD AUTO: 175 K/UL — SIGNIFICANT CHANGE UP (ref 150–400)
POTASSIUM SERPL-MCNC: 4 MMOL/L — SIGNIFICANT CHANGE UP (ref 3.5–5.3)
POTASSIUM SERPL-SCNC: 4 MMOL/L — SIGNIFICANT CHANGE UP (ref 3.5–5.3)
PROTHROM AB SERPL-ACNC: 26.1 SEC — HIGH (ref 9.8–12.7)
RBC # BLD: 3.4 M/UL — LOW (ref 4.2–5.8)
RBC # FLD: 14.8 % — HIGH (ref 10.3–14.5)
SODIUM SERPL-SCNC: 142 MMOL/L — SIGNIFICANT CHANGE UP (ref 135–145)
WBC # BLD: 15.67 K/UL — HIGH (ref 3.8–10.5)
WBC # FLD AUTO: 15.67 K/UL — HIGH (ref 3.8–10.5)

## 2018-09-16 PROCEDURE — 99232 SBSQ HOSP IP/OBS MODERATE 35: CPT

## 2018-09-16 PROCEDURE — 76775 US EXAM ABDO BACK WALL LIM: CPT | Mod: 26

## 2018-09-16 PROCEDURE — 99233 SBSQ HOSP IP/OBS HIGH 50: CPT

## 2018-09-16 RX ORDER — WARFARIN SODIUM 2.5 MG/1
2 TABLET ORAL ONCE
Qty: 0 | Refills: 0 | Status: COMPLETED | OUTPATIENT
Start: 2018-09-16 | End: 2018-09-16

## 2018-09-16 RX ORDER — SODIUM CHLORIDE 9 MG/ML
1000 INJECTION INTRAMUSCULAR; INTRAVENOUS; SUBCUTANEOUS
Qty: 0 | Refills: 0 | Status: DISCONTINUED | OUTPATIENT
Start: 2018-09-16 | End: 2018-09-16

## 2018-09-16 RX ADMIN — ALBUTEROL 2.5 MILLIGRAM(S): 90 AEROSOL, METERED ORAL at 20:34

## 2018-09-16 RX ADMIN — ALBUTEROL 2.5 MILLIGRAM(S): 90 AEROSOL, METERED ORAL at 07:09

## 2018-09-16 RX ADMIN — WARFARIN SODIUM 2 MILLIGRAM(S): 2.5 TABLET ORAL at 21:41

## 2018-09-16 RX ADMIN — Medication 0.12 MILLIGRAM(S): at 06:05

## 2018-09-16 RX ADMIN — Medication 650 MILLIGRAM(S): at 21:39

## 2018-09-16 RX ADMIN — ALBUTEROL 2.5 MILLIGRAM(S): 90 AEROSOL, METERED ORAL at 13:05

## 2018-09-16 RX ADMIN — Medication 650 MILLIGRAM(S): at 22:39

## 2018-09-16 RX ADMIN — PIPERACILLIN AND TAZOBACTAM 25 GRAM(S): 4; .5 INJECTION, POWDER, LYOPHILIZED, FOR SOLUTION INTRAVENOUS at 23:47

## 2018-09-16 RX ADMIN — CLOPIDOGREL BISULFATE 75 MILLIGRAM(S): 75 TABLET, FILM COATED ORAL at 12:59

## 2018-09-16 RX ADMIN — Medication 1 DROP(S): at 06:05

## 2018-09-16 RX ADMIN — SODIUM CHLORIDE 50 MILLILITER(S): 9 INJECTION INTRAMUSCULAR; INTRAVENOUS; SUBCUTANEOUS at 10:40

## 2018-09-16 RX ADMIN — Medication 200 MILLIGRAM(S): at 06:06

## 2018-09-16 RX ADMIN — Medication 1 DROP(S): at 17:24

## 2018-09-16 RX ADMIN — Medication 200 MILLIGRAM(S): at 12:59

## 2018-09-16 RX ADMIN — PIPERACILLIN AND TAZOBACTAM 25 GRAM(S): 4; .5 INJECTION, POWDER, LYOPHILIZED, FOR SOLUTION INTRAVENOUS at 12:59

## 2018-09-16 RX ADMIN — Medication 650 MILLIGRAM(S): at 06:03

## 2018-09-16 RX ADMIN — PANTOPRAZOLE SODIUM 40 MILLIGRAM(S): 20 TABLET, DELAYED RELEASE ORAL at 12:59

## 2018-09-16 RX ADMIN — ATORVASTATIN CALCIUM 40 MILLIGRAM(S): 80 TABLET, FILM COATED ORAL at 21:39

## 2018-09-16 NOTE — PROGRESS NOTE ADULT - ASSESSMENT
85 years old with history of HTN, CAD, CHF, PPM, AF now admitted with wt loss, difficulty swallowing.   Now he is S/P PEG and is on tube feeds. He developed TIBURCIO, which is most likely due to pre renal azotemia and/or Urinary retention.   Renal sonogram is normal.   will stop the  IVF. renal indices are slightly better. will follow.

## 2018-09-16 NOTE — PROGRESS NOTE ADULT - PROBLEM SELECTOR PLAN 3
PAF on home coumadin (5mg x5 days/week, 2.5mg x2 days/week)  c/w Coumadin as per GI. monitor INR  - Continue digoxin 0.125mcg.   - monitor on Tele  - cardio following

## 2018-09-16 NOTE — PROGRESS NOTE ADULT - PROBLEM SELECTOR PLAN 1
s/p EGD with PEG placement 9/14.   PEG care  tube feeds as tolerated  monitor residuals   hob > 30 degrees   hx of zenkers, sp remote repair  ct chest reviewed  ivf as needed  aspiration precautions, elevate hob  ppi iv qd  nutrition recs appreciated  will follow

## 2018-09-16 NOTE — PROGRESS NOTE ADULT - PROBLEM SELECTOR PLAN 1
pulm congestion  TIBURCIO CKD  atelectasis  s/p PEG  dysphagia  oral hygiene  skin care  asp prec  assist with ADL  nutrition via PEG  supportive medical care and assist  on emp ABX - Zosyn  cont NEBS  may benefit from mild diuresis when ok with Renal   am labs pending  replete lytes  out of bed as tolerated  overall prognosis guarded  will follow

## 2018-09-16 NOTE — PROGRESS NOTE ADULT - PROBLEM SELECTOR PLAN 6
HTN, chronic. has hypotension now   c/w Metoprolol succinate 25mg BID. with hold parameter.  Also on IVF, will monitor.

## 2018-09-16 NOTE — PROGRESS NOTE ADULT - PROBLEM SELECTOR PLAN 1
FTT, likely 2/2 poor PO intake, dysphagia, has severe malnutrition.  S/P PEG placement and on tube feeding, tolerating well.   Nutrition consult appreciated.

## 2018-09-16 NOTE — PROGRESS NOTE ADULT - ASSESSMENT
84 M of PAF AC, CAD s/p remote PCI Luu's esophagus, a repaired Zenker's diverticulum, hypertension, reflux, hypercholesterolemia, glaucoma, moderate NICM, HCM s/p biV ICD p/w dyspnea.  CT scan not consistent with severe volume overload; small pericardial and pleural effusions are noted.  Likely some component of aspiration  Ned are elevated but likely from demand ischemia;  BP cont to be soft ( SBP 90's)       Recommend:  - Please continue to maintain strict I/Os, monitor daily weights, Cr, and K.  - plavix restarted, ( ok w/ GI as per notes)  - cont statin   - Cont Metoprolol with parameters 2/2 soft BP not receiving.  Will cont tele to monitor for arrythmia.  Started IVF NS @50cc/hr for 1 liter watch volume status closely.    - Cont Digoxin, digoxin level 1.0 on 9/11  - Dose Coumadin today ( as per GI recs)  with goal inr 2-3.  Watch for bleeding    - Trend CBC as he is at risk for acute blood loss H/H trending down slightly watch closely  - Failed barium swallow test s/p PEG with TF ongoing tolerating  - Further cardiac workup will depend on clinical course.   - TIBURCIO 2/2 urinary obstruction with Dubois placed - renal following, creat improving down to 2.20 today  - All other workup per primary team. Will follow.  - D/C planning to [facility] as per primary team/CM    Candice Lovett NP-C  Cardiology

## 2018-09-16 NOTE — PROGRESS NOTE ADULT - SUBJECTIVE AND OBJECTIVE BOX
HPI:  85 years old with history of PAF on coumadin (5mg x5 days and 2.5mg x2 days/week), CAD s/p remote PCI (1 stent), Luu's esophagus, a repaired Zenker's diverticulum, hypertension, reflux, hypercholesterolemia, glaucoma, moderate NICM, HCM s/p biV ICD p/w weakness and dyspnea. He has been complaining of more difficulty swallowing x a couple of days. He cannot swallow water at this point. He reports losing 18lbs since May but admits he has no appetite. He notes that he has gotten more dyspneic recently and can only walk a few feet. Despite using a cane, he reports difficulty walking. Recently he had his aldactone reduced by half because of weakness by Dr. Burgess. His PPM was recently interrogated (2 weeks ago). He has mild lower ext edema that is unchanged. Additionally, he reports that he has a small wound on his 2nd digit of right foot that is being managed by outpatient podiatrist. Denies any   palpitations, PND, orthopnea, near syncope, syncope,  stroke like symptoms, pain with swallowing.     INTERVAL EVENTS:   Patient seen and examined. No overnight events. Feels tired and weak. He denies any chest pain, SOB, palpitations. No N/V/D.  tolertaing tube feeding.     REVIEW OF SYSTEMS:    CONSTITUTIONAL: No weakness, fevers or chills  EYES/ENT: No visual changes, no throat pain   RESPIRATORY: + cough, wheezing, hemoptysis; No shortness of breath  CARDIOVASCULAR: No chest pain or palpitations  GASTROINTESTINAL: No abdominal pain, nausea, vomiting, or hematemesis; No diarrhea or constipation. No melena or hematochezia.  GENITOURINARY: No dysuria, frequency or hematuria  NEUROLOGICAL: No dizziness, numbness, or weakness  SKIN: No itching, burning, rashes, or lesions   All other review of systems is negative unless indicated above.    Vital Signs Last 24 Hrs  T(C): 36.4 (16 Sep 2018 11:09), Max: 36.7 (15 Sep 2018 20:40)  T(F): 97.5 (16 Sep 2018 11:09), Max: 98.1 (15 Sep 2018 20:40)  HR: 70 (16 Sep 2018 11:09) (68 - 90)  BP: 91/52 (16 Sep 2018 11:09) (87/45 - 100/60)  BP(mean): --  RR: 16 (16 Sep 2018 11:09) (16 - 20)  SpO2: 99% (16 Sep 2018 11:09) (95% - 100%)  PHYSICAL EXAM:     GENERAL: no acute distress  HEENT: NC/AT, EOMI, neck supple, MMM, large wound with eschar on scalp.  RESPIRATORY: good air entry bilaterally, upper airway transmitted noise, no wheeze, decreased BS at bases.  CARDIOVASCULAR: irregular, no murmurs, gallops, rubs  ABDOMINAL: soft, non-tender, non-distended, positive bowel sounds, PEG+ with dressing  EXTREMITIES: no clubbing, cyanosis, or edema  NEUROLOGICAL: alert and oriented x 3, non-focal  SKIN: no rashes or lesions   MUSCULOSKELETAL: no gross joint deformity    LABS:                        11.2   15.67 )-----------( 175      ( 16 Sep 2018 07:33 )             33.1     16 Sep 2018 07:33    142    |  107    |  80     ----------------------------<  106    4.0     |  26     |  2.20     Ca    8.4        16 Sep 2018 07:33      PT/INR - ( 16 Sep 2018 07:33 )   PT: 26.1 sec;   INR: 2.35 ratio             CAPILLARY BLOOD GLUCOSE        UCx       RADIOLOGY & ADDITIONAL TESTS:            MEDICATIONS  (STANDING):  ALBUTerol    0.083% 2.5 milliGRAM(s) Nebulizer every 6 hours  atorvastatin 40 milliGRAM(s) Oral at bedtime  clopidogrel Tablet 75 milliGRAM(s) Oral daily  digoxin     Tablet 0.125 milliGRAM(s) Oral daily  influenza   Vaccine 0.5 milliLiter(s) IntraMuscular once  metoprolol tartrate 25 milliGRAM(s) Oral two times a day  pantoprazole  Injectable 40 milliGRAM(s) IV Push daily  piperacillin/tazobactam IVPB. 3.375 Gram(s) IV Intermittent every 12 hours  sodium chloride 0.9%. 1000 milliLiter(s) (50 mL/Hr) IV Continuous <Continuous>  timolol 0.5% Solution 1 Drop(s) Both EYES two times a day    MEDICATIONS  (PRN):  acetaminophen    Suspension .. 650 milliGRAM(s) Enteral Tube every 6 hours PRN Mild Pain (1 - 3)  guaiFENesin    Syrup 200 milliGRAM(s) Oral every 6 hours PRN Cough

## 2018-09-16 NOTE — PROGRESS NOTE ADULT - SUBJECTIVE AND OBJECTIVE BOX
[facility] -- Cory Fragoso, Aditya Bah, Alexei Tipton Savella  Office # [phone]      Follow Up:  AF, CHF, Dyspnea    Subjective/Observations: Seen and examined.  Sitting up resting comfortably.  Denies cp, sob or palpitations.  Still states having some discomfort at the incisional site of PEG but otherwise no n/v/d and tolerating feed.  No signs of orthopnea or PND.        REVIEW OF SYSTEMS: All other review of systems is negative unless indicated above    PAST MEDICAL & SURGICAL HISTORY:  AF (atrial fibrillation): Cardioversion 5/2012  GERD (gastroesophageal reflux disease)  CHF (congestive heart failure)  CAD (coronary artery disease)  Glaucoma  HTN (Hypertension)  S/P ICD (internal cardiac defibrillator) procedure  H/O excision of Zenker's diverticulum  S/P Cataract Surgery - bilateral  s/p Angioplasty with Stent - 2010  History of Rotator Cuff Surgery Right and Left  S/P Cholecystectomy  S/P TURP (Transurethral Resection of Prostate)  S/P Prostatectomy - 2005      MEDICATIONS  (STANDING):  ALBUTerol    0.083% 2.5 milliGRAM(s) Nebulizer every 6 hours  atorvastatin 40 milliGRAM(s) Oral at bedtime  clopidogrel Tablet 75 milliGRAM(s) Oral daily  digoxin     Tablet 0.125 milliGRAM(s) Oral daily  influenza   Vaccine 0.5 milliLiter(s) IntraMuscular once  metoprolol tartrate 25 milliGRAM(s) Oral two times a day  pantoprazole  Injectable 40 milliGRAM(s) IV Push daily  piperacillin/tazobactam IVPB. 3.375 Gram(s) IV Intermittent every 12 hours  sodium chloride 0.9%. 1000 milliLiter(s) (50 mL/Hr) IV Continuous <Continuous>  timolol 0.5% Solution 1 Drop(s) Both EYES two times a day    MEDICATIONS  (PRN):  acetaminophen    Suspension .. 650 milliGRAM(s) Enteral Tube every 6 hours PRN Mild Pain (1 - 3)  guaiFENesin    Syrup 200 milliGRAM(s) Oral every 6 hours PRN Cough      Allergies    No Known Allergies    Intolerances            Vital Signs Last 24 Hrs  T(C): 36.5 (16 Sep 2018 07:26), Max: 36.7 (15 Sep 2018 20:40)  T(F): 97.7 (16 Sep 2018 07:26), Max: 98.1 (15 Sep 2018 20:40)  HR: 70 (16 Sep 2018 07:26) (68 - 90)  BP: 87/46 (16 Sep 2018 07:26) (87/45 - 100/60)  BP(mean): --  RR: 17 (16 Sep 2018 07:26) (17 - 20)  SpO2: 100% (16 Sep 2018 07:26) (95% - 100%)    I&O's Summary    15 Sep 2018 07:01  -  16 Sep 2018 07:00  --------------------------------------------------------  IN: 2750 mL / OUT: 1000 mL / NET: 1750 mL          PHYSICAL EXAM:  TELE:  V paced @70 no events.    Constitutional: NAD, awake and alert, frail, cachectic  HEENT: dry Mucous Membranes, Anicteric  Pulmonary: Non-labored, breath sounds are clear bilaterally, No wheezing, rales or rhonchi  Cardiovascular: Regular, S1 and S2, No murmurs, rubs, gallops or clicks  Gastrointestinal: Bowel Sounds present, soft, nontender.  Peg site unremarkable TF ongoing  Lymph: No peripheral edema. No lymphadenopathy.  Skin: No visible rashes or ulcers.  Psych:  Mood & affect appropriate    LABS: All Labs Reviewed:                        11.2   15.67 )-----------( 175      ( 16 Sep 2018 07:33 )             33.1                         11.6   14.80 )-----------( 200      ( 15 Sep 2018 07:22 )             34.6                         12.1   16.17 )-----------( 249      ( 14 Sep 2018 07:17 )             37.0     16 Sep 2018 07:33    142    |  107    |  80     ----------------------------<  106    4.0     |  26     |  2.20   15 Sep 2018 07:22    141    |  108    |  77     ----------------------------<  187    4.3     |  25     |  2.30   14 Sep 2018 07:17    142    |  108    |  79     ----------------------------<  125    4.7     |  25     |  2.50     Ca    8.4        16 Sep 2018 07:33  Ca    8.4        15 Sep 2018 07:22  Ca    9.1        14 Sep 2018 07:17  Phos  2.9       14 Sep 2018 22:07  Phos  3.9       14 Sep 2018 07:17  Mg     2.3       14 Sep 2018 22:07  Mg     2.5       14 Sep 2018 07:17      PT/INR - ( 16 Sep 2018 07:33 )   PT: 26.1 sec;   INR: 2.35 ratio      < from: 12 Lead ECG (09.10.18 @ 17:39) >  Ventricular Rate 70 BPM    Atrial Rate 70 BPM    QRS Duration 154 ms    Q-T Interval 436 ms    QTC Calculation(Bezet) 470 ms    R Axis -53 degrees    T Axis -78 degrees    Diagnosis Line Ventricular-paced rhythm  Confirmed by SARITA TIPTON ([id number])on 9/11/2018 11:32:55 AM    < end of copied text >  < from: TTE Echo Doppler w/o Cont (05.20.15 @ 09:36) >   EXAM:  ECHO TTE W/O CON COMP W/DOPPLR           PROCEDURE DATE:  05/20/2015      INTERPRETATION:  Ordering Physician: PATITO MARTINEZ    Indication: Congestive heart failure    Technician: IGOR    Study Quality: Fair     Height: 190.5 cm  Weight: 95 kg  Blood Pressure: 102/60    MEASUREMENTS  IVS: 2.0 cm  PWT: 2.0 cm  LA: 4.2 cm  Aortic Root: 3.6 cm  LVIDd: 4.3 cm  LVIDs: 3.2 cm    LVEF: Approximately 40%    FINDINGS  Left Ventricle: Normal left ventricular size with moderate global   systolicdysfunction. LVEF estimated at 40%. Severe concentric left   ventricular hypertrophy.  Right Ventricle: Right ventricular enlargement with decreased function. A   pacing wire is seen in the right heart.  Left Atrium: Mild left atrial enlargement.  Right Atrium: Right atrial enlargement. The IVC is dilated and collapses   less than 50% with inspiration, suggesting elevated right atrial pressure.  Mitral Valve: Normal mitral valve. Mild mitral regurgitation.  Aortic Valve: Aortic valve sclerosis.  Tricuspid Valve: Normal tricuspid valve. Moderate tricuspid   regurgitation. Pulmonary artery systolic pressure estimated at 51 mmHg,   assuming a right atrial pressure of 15 mmHg, consistent with moderate   pulmonary hypertension.  Pulmonic Valve: Normal pulmonic valve.  Pericardium/Pleura: Small pericardial effusion. Bilateral pleural   effusions.      CONCLUSIONS:  1. Normal left ventricular size with moderate global systolic   dysfunction. LVEF estimated at 40%.   2. Severe concentric left ventricular hypertrophy.  3. Right ventricular enlargement with decreased function.   4. Biatrial enlargement.  5. Mild mitral regurgitation.  6. Moderate tricuspid regurgitation.   7. Moderate pulmonary hypertension.  8. Small pericardial effusion. Bilateral pleural effusions.              MAYITO CRAMER M.D., ATTENDING CARDIOLOGIST  This examination was interpreted on: May 21 2015 12:23P.  This document   has been electronically signed. May 21 2015 12:27P.          < end of copied text >    < from: Ira Modified Barium Swallow (09.11.18 @ 10:40) >  EXAM:  SWALLOWING FUNCTION W VIDEO                            PROCEDURE DATE:  09/11/2018          INTERPRETATION:  CLINICAL INFORMATION: Dysphagia.    PROCEDURE: Fluoroscopy provided to speech-language pathology service for   evaluation of dysphagia.    FINDINGS/  IMPRESSION:    Please see speech/language pathology report for further details.                  BEVERLEY ORTIZ M.D., ATTENDING RADIOLOGIST  This document has been electronically signed. Sep 11 2018 11:10AM          < end of copied text >

## 2018-09-16 NOTE — PROGRESS NOTE ADULT - PROBLEM SELECTOR PLAN 7
HFpEF (EF 40%). Does not appear overloaded at this time.   lasix on hold now for TIBURCIO and hypotension  Monitor volume status closely.    strict Is and Os

## 2018-09-16 NOTE — PROGRESS NOTE ADULT - SUBJECTIVE AND OBJECTIVE BOX
Interval Events:   s/p EGD with PEG placement  PEG site c/d/i  Tolerating tube feeds   + bm, offers no complaints     MEDICATIONS  (STANDING):  ALBUTerol    0.083% 2.5 milliGRAM(s) Nebulizer every 6 hours  dextrose 5% + sodium chloride 0.45%. 1000 milliLiter(s) (50 mL/Hr) IV Continuous <Continuous>  digoxin  Injectable 0.25 milliGRAM(s) IV Push daily  guaiFENesin  milliGRAM(s) Oral every 12 hours  influenza   Vaccine 0.5 milliLiter(s) IntraMuscular once  metoprolol tartrate Injectable 5 milliGRAM(s) IV Push every 6 hours  morphine   Solution 2 milliGRAM(s) Oral every 6 hours  pantoprazole  Injectable 40 milliGRAM(s) IV Push daily  piperacillin/tazobactam IVPB. 3.375 Gram(s) IV Intermittent every 12 hours  timolol 0.5% Solution 1 Drop(s) Both EYES two times a day    MEDICATIONS  (PRN):      Allergies    No Known Allergies    Intolerances        Review of Systems:    General:  No wt loss, fevers, chills, night sweats,fatigue,   Eyes:  Good vision, no reported pain  ENT:  No sore throat, pain, runny nose, dysphagia  CV:  No pain, palpitations, hypo/hypertension  Resp:  No dyspnea, cough, tachypnea, wheezing  GI:  + mild incisional pain,  No nausea, No vomiting, No diarrhea, No constipation, No weight loss, No fever, No pruritis, No rectal bleeding, No melena, No dysphagia  :  No pain, bleeding, incontinence, nocturia  Muscle:  No pain, weakness  Neuro:  No weakness, tingling, memory problems  Psych:  No fatigue, insomnia, mood problems, depression  Endocrine:  No polyuria, polydypsia, cold/heat intolerance  Heme:  No petechiae, ecchymosis, easy bruisability  Skin:  No rash, tattoos, scars, edema      Vital Signs Last 24 Hrs  T(C): 36.5 (16 Sep 2018 07:26), Max: 36.7 (15 Sep 2018 20:40)  T(F): 97.7 (16 Sep 2018 07:26), Max: 98.1 (15 Sep 2018 20:40)  HR: 70 (16 Sep 2018 07:26) (68 - 90)  BP: 87/46 (16 Sep 2018 07:26) (87/45 - 100/60)  BP(mean): --  RR: 17 (16 Sep 2018 07:26) (17 - 20)  SpO2: 100% (16 Sep 2018 07:26) (95% - 100%)  PHYSICAL EXAM:    Constitutional: NAD, well-developed  HEENT: EOMI, throat clear  Neck: No LAD, supple  Respiratory: CTA and P  Cardiovascular: S1 and S2, RRR, no M  Gastrointestinal: BS+, soft, NT/ND, neg HSM, PEG c/d/i  Extremities: No peripheral edema, neg clubbing, cyanosis  Vascular: 2+ peripheral pulses  Neurological: A/O x 3, no focal deficits  Psychiatric: Normal mood, normal affect  Skin: No rashes      LABS:                                                               11.2   15.67 )-----------( 175      ( 16 Sep 2018 07:33 )             33.1   09-16    142  |  107  |  80<H>  ----------------------------<  106<H>  4.0   |  26  |  2.20<H>    Ca    8.4<L>      16 Sep 2018 07:33  Phos  2.9     09-14  Mg     2.3     09-14                     RADIOLOGY & ADDITIONAL TESTS:

## 2018-09-16 NOTE — PROGRESS NOTE ADULT - ATTENDING COMMENTS
- fall precautions  - PT consult recommending HCPT originally, much weaker and low BP today. PT to re-eval.  -Seen by cardiothoracic surgery Shailesh, who recommended PEG placement for now and outpatient follow-up with eventual surgical intervention for Zenker's diverticulum.  -Advanced directive and planning d/w Pt and wife. pt wishes to be DNR

## 2018-09-16 NOTE — PROGRESS NOTE ADULT - SUBJECTIVE AND OBJECTIVE BOX
Date/Time Patient Seen:  		  Referring MD:   Data Reviewed	       Patient is a 85y old  Male who presents with a chief complaint of sob, dyspnea, difficulty walking, dysphagia (15 Sep 2018 17:25)  in bed  seen and examined  vs and meds reviewed      Subjective/HPI     PAST MEDICAL & SURGICAL HISTORY:  AF (atrial fibrillation): Cardioversion 5/2012  GERD (gastroesophageal reflux disease)  CHF (congestive heart failure)  CAD (coronary artery disease)  Glaucoma  HTN (Hypertension)  S/P ICD (internal cardiac defibrillator) procedure  H/O excision of Zenker's diverticulum  S/P Cataract Surgery - bilateral  s/p Angioplasty with Stent - 2010  History of Rotator Cuff Surgery Right and Left  S/P Cholecystectomy  S/P TURP (Transurethral Resection of Prostate)  S/P Prostatectomy - 2005  S/P Prostatectomy  S/P Prostatectomy        Medication list         MEDICATIONS  (STANDING):  ALBUTerol    0.083% 2.5 milliGRAM(s) Nebulizer every 6 hours  atorvastatin 40 milliGRAM(s) Oral at bedtime  clopidogrel Tablet 75 milliGRAM(s) Oral daily  digoxin     Tablet 0.125 milliGRAM(s) Oral daily  influenza   Vaccine 0.5 milliLiter(s) IntraMuscular once  metoprolol tartrate 25 milliGRAM(s) Oral two times a day  pantoprazole  Injectable 40 milliGRAM(s) IV Push daily  piperacillin/tazobactam IVPB. 3.375 Gram(s) IV Intermittent every 12 hours  timolol 0.5% Solution 1 Drop(s) Both EYES two times a day    MEDICATIONS  (PRN):  acetaminophen    Suspension .. 650 milliGRAM(s) Enteral Tube every 6 hours PRN Mild Pain (1 - 3)  guaiFENesin    Syrup 200 milliGRAM(s) Oral every 6 hours PRN Cough         Vitals log        ICU Vital Signs Last 24 Hrs  T(C): 36.6 (16 Sep 2018 04:01), Max: 36.7 (15 Sep 2018 20:40)  T(F): 97.8 (16 Sep 2018 04:01), Max: 98.1 (15 Sep 2018 20:40)  HR: 70 (16 Sep 2018 04:01) (68 - 90)  BP: 87/45 (16 Sep 2018 04:01) (87/45 - 100/60)  BP(mean): --  ABP: --  ABP(mean): --  RR: 18 (16 Sep 2018 04:01) (18 - 20)  SpO2: 99% (16 Sep 2018 04:01) (95% - 100%)           Input and Output:  I&O's Detail    14 Sep 2018 07:01  -  15 Sep 2018 07:00  --------------------------------------------------------  IN:    dextrose 5% + sodium chloride 0.45%.: 950 mL    Enteral Tube Flush: 325 mL    Free Water: 180 mL    ns in tub fed  [id number]: 835 mL    Sodium Chloride 0.9% IV Bolus: 500 mL    Solution: 100 mL  Total IN: 2890 mL    OUT:    Indwelling Catheter - Urethral: 1050 mL  Total OUT: 1050 mL    Total NET: 1840 mL      15 Sep 2018 07:01  -  16 Sep 2018 06:39  --------------------------------------------------------  IN:    dextrose 5% + sodium chloride 0.45%.: 150 mL    Enteral Tube Flush: 600 mL    Free Water: 290 mL    ns in tub fed  [id number]: 1610 mL    Solution: 100 mL  Total IN: 2750 mL    OUT:    Indwelling Catheter - Urethral: 1000 mL  Total OUT: 1000 mL    Total NET: 1750 mL          Lab Data                        11.6   14.80 )-----------( 200      ( 15 Sep 2018 07:22 )             34.6     09-15    141  |  108  |  77<H>  ----------------------------<  187<H>  4.3   |  25  |  2.30<H>    Ca    8.4<L>      15 Sep 2018 07:22  Phos  2.9     09-14  Mg     2.3     09-14              Review of Systems	      Objective     Physical Examination    heart s1s2  lung dec BS  abd soft  PEG in place      Pertinent Lab findings & Imaging      Sherly:  NO   Adequate UO     I&O's Detail    14 Sep 2018 07:01  -  15 Sep 2018 07:00  --------------------------------------------------------  IN:    dextrose 5% + sodium chloride 0.45%.: 950 mL    Enteral Tube Flush: 325 mL    Free Water: 180 mL    ns in tub fed  [id number]: 835 mL    Sodium Chloride 0.9% IV Bolus: 500 mL    Solution: 100 mL  Total IN: 2890 mL    OUT:    Indwelling Catheter - Urethral: 1050 mL  Total OUT: 1050 mL    Total NET: 1840 mL      15 Sep 2018 07:01  -  16 Sep 2018 06:39  --------------------------------------------------------  IN:    dextrose 5% + sodium chloride 0.45%.: 150 mL    Enteral Tube Flush: 600 mL    Free Water: 290 mL    ns in tub fed  [id number]: 1610 mL    Solution: 100 mL  Total IN: 2750 mL    OUT:    Indwelling Catheter - Urethral: 1000 mL  Total OUT: 1000 mL    Total NET: 1750 mL               Discussed with:     Cultures:	        Radiology

## 2018-09-16 NOTE — PROGRESS NOTE ADULT - SUBJECTIVE AND OBJECTIVE BOX
KATE ZAFAR  85y  Male    Patient is a 85y old  Male who presents with a chief complaint of sob, dyspnea, difficulty walking, dysphagia (16 Sep 2018 12:18)      comfortable but complains of some cough and wheezing.      PAST MEDICAL & SURGICAL HISTORY:  AF (atrial fibrillation): Cardioversion 5/2012  GERD (gastroesophageal reflux disease)  CHF (congestive heart failure)  CAD (coronary artery disease)  Glaucoma  HTN (Hypertension)  S/P ICD (internal cardiac defibrillator) procedure  H/O excision of Zenker's diverticulum  S/P Cataract Surgery - bilateral  s/p Angioplasty with Stent - 2010  History of Rotator Cuff Surgery Right and Left  S/P Cholecystectomy  S/P TURP (Transurethral Resection of Prostate)  S/P Prostatectomy - 2005      PHYSICAL EXAM:    T(C): 36.4 (09-16-18 @ 11:09), Max: 36.7 (09-15-18 @ 20:40)  HR: 72 (09-16-18 @ 13:05) (68 - 82)  BP: 91/52 (09-16-18 @ 11:09) (87/45 - 100/60)  RR: 16 (09-16-18 @ 11:09) (16 - 20)  SpO2: 99% (09-16-18 @ 11:09) (95% - 100%)  Wt(kg): --    I&O's Detail    15 Sep 2018 07:01  -  16 Sep 2018 07:00  --------------------------------------------------------  IN:    dextrose 5% + sodium chloride 0.45%.: 150 mL    Enteral Tube Flush: 600 mL    Free Water: 290 mL    ns in tub fed  [id number]: 1610 mL    Solution: 100 mL  Total IN: 2750 mL    OUT:    Indwelling Catheter - Urethral: 1000 mL  Total OUT: 1000 mL    Total NET: 1750 mL    Respiratory: bilateral rhonchi  Cardiovascular: S1 S2  Gastrointestinal: soft NT ND +BS  Extremities: one plus edema   Neuro: Awake and alert    MEDICATIONS  (STANDING):  ALBUTerol    0.083% 2.5 milliGRAM(s) Nebulizer every 6 hours  atorvastatin 40 milliGRAM(s) Oral at bedtime  clopidogrel Tablet 75 milliGRAM(s) Oral daily  digoxin     Tablet 0.125 milliGRAM(s) Oral daily  influenza   Vaccine 0.5 milliLiter(s) IntraMuscular once  metoprolol tartrate 25 milliGRAM(s) Oral two times a day  pantoprazole  Injectable 40 milliGRAM(s) IV Push daily  piperacillin/tazobactam IVPB. 3.375 Gram(s) IV Intermittent every 12 hours  sodium chloride 0.9%. 1000 milliLiter(s) (50 mL/Hr) IV Continuous <Continuous>  timolol 0.5% Solution 1 Drop(s) Both EYES two times a day  warfarin 2 milliGRAM(s) Oral once    MEDICATIONS  (PRN):  acetaminophen    Suspension .. 650 milliGRAM(s) Enteral Tube every 6 hours PRN Mild Pain (1 - 3)  guaiFENesin    Syrup 200 milliGRAM(s) Oral every 6 hours PRN Cough                            11.2   15.67 )-----------( 175      ( 16 Sep 2018 07:33 )             33.1       09-16    142  |  107  |  80<H>  ----------------------------<  106<H>  4.0   |  26  |  2.20<H>    Ca    8.4<L>      16 Sep 2018 07:33  Phos  2.9     09-14  Mg     2.3     09-14    < from: US Renal (09.16.18 @ 10:28) >    EXAM:  US KIDNEY(S)                            PROCEDURE DATE:  09/16/2018          INTERPRETATION:  History: Renal failure.  Bilateral renal ultrasound.  Right kidney 10.3 left 10.6 cm long dimension. No hydronephrosis   shadowing calculus or solid/suspicious renal mass. There is a solitary   simple exophytic cortical cyst interpolar left kidney 3 x 3 cm. Bladder   with Dubois. Dental right pleural effusion.    Impression: No hydronephrosis.

## 2018-09-17 DIAGNOSIS — J18.9 PNEUMONIA, UNSPECIFIED ORGANISM: ICD-10-CM

## 2018-09-17 LAB
ANION GAP SERPL CALC-SCNC: 8 MMOL/L — SIGNIFICANT CHANGE UP (ref 5–17)
BUN SERPL-MCNC: 88 MG/DL — HIGH (ref 7–23)
CALCIUM SERPL-MCNC: 8 MG/DL — LOW (ref 8.5–10.1)
CHLORIDE SERPL-SCNC: 105 MMOL/L — SIGNIFICANT CHANGE UP (ref 96–108)
CO2 SERPL-SCNC: 26 MMOL/L — SIGNIFICANT CHANGE UP (ref 22–31)
CREAT SERPL-MCNC: 2.1 MG/DL — HIGH (ref 0.5–1.3)
GLUCOSE SERPL-MCNC: 116 MG/DL — HIGH (ref 70–99)
HCT VFR BLD CALC: 29.9 % — LOW (ref 39–50)
HGB BLD-MCNC: 9.8 G/DL — LOW (ref 13–17)
INR BLD: 3.5 RATIO — HIGH (ref 0.88–1.16)
MCHC RBC-ENTMCNC: 31.9 PG — SIGNIFICANT CHANGE UP (ref 27–34)
MCHC RBC-ENTMCNC: 32.8 GM/DL — SIGNIFICANT CHANGE UP (ref 32–36)
MCV RBC AUTO: 97.4 FL — SIGNIFICANT CHANGE UP (ref 80–100)
NRBC # BLD: 0 /100 WBCS — SIGNIFICANT CHANGE UP (ref 0–0)
PLATELET # BLD AUTO: 161 K/UL — SIGNIFICANT CHANGE UP (ref 150–400)
POTASSIUM SERPL-MCNC: 4.2 MMOL/L — SIGNIFICANT CHANGE UP (ref 3.5–5.3)
POTASSIUM SERPL-SCNC: 4.2 MMOL/L — SIGNIFICANT CHANGE UP (ref 3.5–5.3)
PROTHROM AB SERPL-ACNC: 39.1 SEC — HIGH (ref 9.8–12.7)
RBC # BLD: 3.07 M/UL — LOW (ref 4.2–5.8)
RBC # FLD: 14.8 % — HIGH (ref 10.3–14.5)
SODIUM SERPL-SCNC: 139 MMOL/L — SIGNIFICANT CHANGE UP (ref 135–145)
WBC # BLD: 12.92 K/UL — HIGH (ref 3.8–10.5)
WBC # FLD AUTO: 12.92 K/UL — HIGH (ref 3.8–10.5)

## 2018-09-17 PROCEDURE — 99233 SBSQ HOSP IP/OBS HIGH 50: CPT | Mod: GC

## 2018-09-17 PROCEDURE — 99232 SBSQ HOSP IP/OBS MODERATE 35: CPT

## 2018-09-17 RX ORDER — MIDODRINE HYDROCHLORIDE 2.5 MG/1
2.5 TABLET ORAL
Qty: 0 | Refills: 0 | Status: DISCONTINUED | OUTPATIENT
Start: 2018-09-17 | End: 2018-09-20

## 2018-09-17 RX ADMIN — PANTOPRAZOLE SODIUM 40 MILLIGRAM(S): 20 TABLET, DELAYED RELEASE ORAL at 11:41

## 2018-09-17 RX ADMIN — Medication 1 DROP(S): at 05:42

## 2018-09-17 RX ADMIN — MIDODRINE HYDROCHLORIDE 2.5 MILLIGRAM(S): 2.5 TABLET ORAL at 17:09

## 2018-09-17 RX ADMIN — CLOPIDOGREL BISULFATE 75 MILLIGRAM(S): 75 TABLET, FILM COATED ORAL at 11:41

## 2018-09-17 RX ADMIN — Medication 650 MILLIGRAM(S): at 13:30

## 2018-09-17 RX ADMIN — PIPERACILLIN AND TAZOBACTAM 25 GRAM(S): 4; .5 INJECTION, POWDER, LYOPHILIZED, FOR SOLUTION INTRAVENOUS at 12:14

## 2018-09-17 RX ADMIN — ATORVASTATIN CALCIUM 40 MILLIGRAM(S): 80 TABLET, FILM COATED ORAL at 21:21

## 2018-09-17 RX ADMIN — ALBUTEROL 2.5 MILLIGRAM(S): 90 AEROSOL, METERED ORAL at 19:50

## 2018-09-17 RX ADMIN — Medication 1 DROP(S): at 17:10

## 2018-09-17 RX ADMIN — ALBUTEROL 2.5 MILLIGRAM(S): 90 AEROSOL, METERED ORAL at 07:53

## 2018-09-17 RX ADMIN — Medication 0.12 MILLIGRAM(S): at 05:42

## 2018-09-17 RX ADMIN — Medication 650 MILLIGRAM(S): at 12:58

## 2018-09-17 RX ADMIN — Medication 200 MILLIGRAM(S): at 05:42

## 2018-09-17 RX ADMIN — ALBUTEROL 2.5 MILLIGRAM(S): 90 AEROSOL, METERED ORAL at 13:18

## 2018-09-17 NOTE — PROGRESS NOTE ADULT - PROBLEM SELECTOR PLAN 6
-HTN, chronic. has hypotension now   -started on low dose midodrine as per nephro for hypotension  -c/w Metoprolol succinate 25mg BID. with hold parameter.  -s/p IVF, will monitor

## 2018-09-17 NOTE — PROGRESS NOTE ADULT - ATTENDING COMMENTS
Advance care planning d/w pt in detail. He is aware of his poor condition and multiple medical problems.  C/W current treatment  Plliative care

## 2018-09-17 NOTE — PROGRESS NOTE ADULT - PROBLEM SELECTOR PLAN 7
-HFpEF (EF 40%). Does not appear overloaded at this time.   -lasix on hold now for TIBURCIO and hypotension  -Monitor volume status closely.    -strict Is and Os

## 2018-09-17 NOTE — PROGRESS NOTE ADULT - PROBLEM SELECTOR PLAN 1
-FTT, likely 2/2 poor PO intake, dysphagia, has severe malnutrition  -S/P PEG placement and on tube feeding, tolerating well

## 2018-09-17 NOTE — PROGRESS NOTE ADULT - ASSESSMENT
·	TIBURCIO, CKD 3  ·	Anemia  ·	Urinary retention  ·	Hypotension    Improving renal function trend. Pt with chronic hypotension as per family at bedside. Continue tube feeds.   No need for IVF. Will try low dose midodrine. Dubois drainage. ?  evaluation. Monitor h/h trend.   D/w pt's family at bedside.

## 2018-09-17 NOTE — PROGRESS NOTE ADULT - SUBJECTIVE AND OBJECTIVE BOX
Patient is a 85y old  Male who presents with a chief complaint of sob, dyspnea, difficulty walking, dysphagia (17 Sep 2018 12:11)      Patient seen in follow up for TIBURCIO. On tube feeds. BP low.     PAST MEDICAL HISTORY:  AF (atrial fibrillation)  GERD (gastroesophageal reflux disease)  CHF (congestive heart failure)  CAD (coronary artery disease)  Glaucoma  HTN (Hypertension)    MEDICATIONS  (STANDING):  ALBUTerol    0.083% 2.5 milliGRAM(s) Nebulizer every 6 hours  atorvastatin 40 milliGRAM(s) Oral at bedtime  clopidogrel Tablet 75 milliGRAM(s) Oral daily  digoxin     Tablet 0.125 milliGRAM(s) Oral daily  influenza   Vaccine 0.5 milliLiter(s) IntraMuscular once  metoprolol tartrate 25 milliGRAM(s) Oral two times a day  pantoprazole  Injectable 40 milliGRAM(s) IV Push daily  piperacillin/tazobactam IVPB. 3.375 Gram(s) IV Intermittent every 12 hours  timolol 0.5% Solution 1 Drop(s) Both EYES two times a day    MEDICATIONS  (PRN):  acetaminophen    Suspension .. 650 milliGRAM(s) Enteral Tube every 6 hours PRN Mild Pain (1 - 3)  guaiFENesin    Syrup 200 milliGRAM(s) Oral every 6 hours PRN Cough    T(C): 36.6 (09-17-18 @ 11:32), Max: 36.7 (09-17-18 @ 00:52)  HR: 70 (09-17-18 @ 11:32) (69 - 78)  BP: 113/65 (09-17-18 @ 11:32) (87/45 - 113/65)  RR: 18 (09-17-18 @ 11:32) (16 - 24)  SpO2: 98% (09-17-18 @ 11:32) (95% - 100%)  Wt(kg): --  I&O's Detail    16 Sep 2018 07:01  -  17 Sep 2018 07:00  --------------------------------------------------------  IN:    Enteral Tube Flush: 600 mL    Free Water: 360 mL    ns in tub fed  [id number]: 1680 mL    sodium chloride 0.9%: 250 mL    Solution: 200 mL  Total IN: 3090 mL    OUT:    Indwelling Catheter - Urethral: 550 mL  Total OUT: 550 mL    Total NET: 2540 mL      17 Sep 2018 07:01  -  17 Sep 2018 13:55  --------------------------------------------------------  IN:    Enteral Tube Flush: 150 mL    ns in tub fed  [id number]: 420 mL  Total IN: 570 mL    OUT:  Total OUT: 0 mL    Total NET: 570 mL      PHYSICAL EXAM:  General: NAD  Respiratory: b/l air entry  Cardiovascular: S1 S2  Gastrointestinal: soft, s/p PEG  Extremities:  no edema                          9.8    12.92 )-----------( 161      ( 17 Sep 2018 07:28 )             29.9     09-17    139  |  105  |  88<H>  ----------------------------<  116<H>  4.2   |  26  |  2.10<H>    Ca    8.0<L>      17 Sep 2018 07:28        Sodium, Serum: 139 (09-17 @ 07:28)  Sodium, Serum: 142 (09-16 @ 07:33)  Sodium, Serum: 141 (09-15 @ 07:22)  Sodium, Serum: 142 (09-14 @ 07:17)    Creatinine, Serum: 2.10 (09-17 @ 07:28)  Creatinine, Serum: 2.20 (09-16 @ 07:33)  Creatinine, Serum: 2.30 (09-15 @ 07:22)  Creatinine, Serum: 2.50 (09-14 @ 07:17)    Potassium, Serum: 4.2 (09-17 @ 07:28)  Potassium, Serum: 4.0 (09-16 @ 07:33)  Potassium, Serum: 4.3 (09-15 @ 07:22)  Potassium, Serum: 4.7 (09-14 @ 07:17)    Hemoglobin: 9.8 (09-17 @ 07:28)  Hemoglobin: 11.2 (09-16 @ 07:33)  Hemoglobin: 11.6 (09-15 @ 07:22)  Hemoglobin: 12.1 (09-14 @ 07:17)

## 2018-09-17 NOTE — PROGRESS NOTE ADULT - SUBJECTIVE AND OBJECTIVE BOX
Resident Progress Note    Patient is a 85y old Male who presents with a chief complaint of sob, dyspnea, difficulty walking, dysphagia (17 Sep 2018 13:55)    HPI: Patient seen and examined at bedside.  Patient was complaining of "chest pain" but when asked to point, he pointed to LUQ over lower ribs on left side.  As per patient and wife, patient has been coughing a lot recently and complained of similar pain last week.  Pain was rated 5/10, not radiating, reproducible with palpation and movement.  Tele strip was reviewed, no events noted, v-pacing with movement noted as patient was with PT at time of "chest pain".  Most likely pain was MSK related.  Will continue to monitor.  No other acute complaints.    ROS:  CONSTITUTIONAL: No weakness, fevers or chills  EYES/ENT: No visual changes, no throat pain   RESPIRATORY: + cough, wheezing, denies hemoptysis, shortness of breath  CARDIOVASCULAR: No chest pain or palpitations  GASTROINTESTINAL: admits to abdominal pain over LUQ ribs 8-10, denies nausea, vomiting, or hematemesis; No diarrhea or constipation. No melena or hematochezia.  GENITOURINARY: No dysuria, frequency or hematuria  NEUROLOGICAL: No dizziness, numbness, or weakness  All other review of systems is negative unless indicated above.    Vital Signs Last 24 Hrs  T(C): 36.6 (09-17-18 @ 11:32), Max: 36.7 (09-17-18 @ 00:52)  T(F): 97.8 (09-17-18 @ 11:32), Max: 98.1 (09-17-18 @ 00:52)  HR: 70 (09-17-18 @ 11:32) (69 - 74)  BP: 113/65 (09-17-18 @ 11:32) (89/50 - 113/65)  RR: 18 (09-17-18 @ 11:32) (18 - 24)  SpO2: 98% (09-17-18 @ 11:32) (95% - 100%)    Physical Exam:  HEENT: NC/AT, EOMI, neck supple, MMM, large wound with eschar on scalp  RESPIRATORY: CTA b/l, diminished breath sounds at the bases b/l  CARDIOVASCULAR: RRR, no murmurs, gallops, rubs  ABDOMINAL: soft, non-tender, non-distended, positive bowel sounds, PEG+ with dressing, c/d/i  EXTREMITIES: no clubbing, cyanosis, or edema    LABS:                        9.8    12.92 )-----------( 161      ( 17 Sep 2018 07:28 )             29.9     17 Sep 2018 07:28    139    |  105    |  88     ----------------------------<  116    4.2     |  26     |  2.10     Ca    8.0        17 Sep 2018 07:28      PT/INR - ( 17 Sep 2018 07:28 )   PT: 39.1 sec;   INR: 3.50 ratio      MEDICATIONS  (STANDING):  ALBUTerol    0.083% 2.5 milliGRAM(s) Nebulizer every 6 hours  atorvastatin 40 milliGRAM(s) Oral at bedtime  clopidogrel Tablet 75 milliGRAM(s) Oral daily  digoxin     Tablet 0.125 milliGRAM(s) Oral daily  influenza   Vaccine 0.5 milliLiter(s) IntraMuscular once  metoprolol tartrate 25 milliGRAM(s) Oral two times a day  midodrine 2.5 milliGRAM(s) Oral <User Schedule>  pantoprazole  Injectable 40 milliGRAM(s) IV Push daily  piperacillin/tazobactam IVPB. 3.375 Gram(s) IV Intermittent every 12 hours  timolol 0.5% Solution 1 Drop(s) Both EYES two times a day    MEDICATIONS  (PRN):  acetaminophen    Suspension .. 650 milliGRAM(s) Enteral Tube every 6 hours PRN Mild Pain (1 - 3)  guaiFENesin    Syrup 200 milliGRAM(s) Oral every 6 hours PRN Cough    Allergies  No Known Allergies

## 2018-09-17 NOTE — PROGRESS NOTE ADULT - PROBLEM SELECTOR PLAN 1
s/p PEG  aspiration remains high  HOB elev  on ABX  off IVF  monitor for volume overload  NEBS  suction PRN  chest PT  monitor vs and HD and Sat  on room air  will follow  increase activity  overall prognosis guarded

## 2018-09-17 NOTE — PROGRESS NOTE ADULT - SUBJECTIVE AND OBJECTIVE BOX
Date/Time Patient Seen:  		  Referring MD:   Data Reviewed	       Patient is a 85y old  Male who presents with a chief complaint of sob, dyspnea, difficulty walking, dysphagia (16 Sep 2018 14:45)  in bed  seen and examined  vs and meds reviewed      Subjective/HPI     PAST MEDICAL & SURGICAL HISTORY:  AF (atrial fibrillation): Cardioversion 5/2012  GERD (gastroesophageal reflux disease)  CHF (congestive heart failure)  CAD (coronary artery disease)  Glaucoma  HTN (Hypertension)  S/P ICD (internal cardiac defibrillator) procedure  H/O excision of Zenker's diverticulum  S/P Cataract Surgery - bilateral  s/p Angioplasty with Stent - 2010  History of Rotator Cuff Surgery Right and Left  S/P Cholecystectomy  S/P TURP (Transurethral Resection of Prostate)  S/P Prostatectomy - 2005  S/P Prostatectomy  S/P Prostatectomy        Medication list         MEDICATIONS  (STANDING):  ALBUTerol    0.083% 2.5 milliGRAM(s) Nebulizer every 6 hours  atorvastatin 40 milliGRAM(s) Oral at bedtime  clopidogrel Tablet 75 milliGRAM(s) Oral daily  digoxin     Tablet 0.125 milliGRAM(s) Oral daily  influenza   Vaccine 0.5 milliLiter(s) IntraMuscular once  metoprolol tartrate 25 milliGRAM(s) Oral two times a day  pantoprazole  Injectable 40 milliGRAM(s) IV Push daily  piperacillin/tazobactam IVPB. 3.375 Gram(s) IV Intermittent every 12 hours  timolol 0.5% Solution 1 Drop(s) Both EYES two times a day    MEDICATIONS  (PRN):  acetaminophen    Suspension .. 650 milliGRAM(s) Enteral Tube every 6 hours PRN Mild Pain (1 - 3)  guaiFENesin    Syrup 200 milliGRAM(s) Oral every 6 hours PRN Cough         Vitals log        ICU Vital Signs Last 24 Hrs  T(C): 36.6 (17 Sep 2018 04:00), Max: 36.7 (17 Sep 2018 00:52)  T(F): 97.9 (17 Sep 2018 04:00), Max: 98.1 (17 Sep 2018 00:52)  HR: 70 (17 Sep 2018 05:24) (69 - 78)  BP: 89/50 (17 Sep 2018 05:24) (87/46 - 109/63)  BP(mean): --  ABP: --  ABP(mean): --  RR: 18 (17 Sep 2018 05:24) (16 - 24)  SpO2: 98% (17 Sep 2018 05:24) (97% - 100%)           Input and Output:  I&O's Detail    15 Sep 2018 07:01  -  16 Sep 2018 07:00  --------------------------------------------------------  IN:    dextrose 5% + sodium chloride 0.45%.: 150 mL    Enteral Tube Flush: 600 mL    Free Water: 290 mL    ns in tub fed  [id number]: 1610 mL    Solution: 100 mL  Total IN: 2750 mL    OUT:    Indwelling Catheter - Urethral: 1000 mL  Total OUT: 1000 mL    Total NET: 1750 mL      16 Sep 2018 07:01  -  17 Sep 2018 06:55  --------------------------------------------------------  IN:    Enteral Tube Flush: 575 mL    Free Water: 360 mL    ns in tub fed  [id number]: 1610 mL    sodium chloride 0.9%: 250 mL    Solution: 200 mL  Total IN: 2995 mL    OUT:    Indwelling Catheter - Urethral: 550 mL  Total OUT: 550 mL    Total NET: 2445 mL          Lab Data                        11.2   15.67 )-----------( 175      ( 16 Sep 2018 07:33 )             33.1     09-16    142  |  107  |  80<H>  ----------------------------<  106<H>  4.0   |  26  |  2.20<H>    Ca    8.4<L>      16 Sep 2018 07:33              Review of Systems	      Objective     Physical Examination    heart s1s2  lung dec BS  abd soft      Pertinent Lab findings & Imaging      Sherly:  NO   Adequate UO     I&O's Detail    15 Sep 2018 07:01  -  16 Sep 2018 07:00  --------------------------------------------------------  IN:    dextrose 5% + sodium chloride 0.45%.: 150 mL    Enteral Tube Flush: 600 mL    Free Water: 290 mL    ns in tub fed  [id number]: 1610 mL    Solution: 100 mL  Total IN: 2750 mL    OUT:    Indwelling Catheter - Urethral: 1000 mL  Total OUT: 1000 mL    Total NET: 1750 mL      16 Sep 2018 07:01  -  17 Sep 2018 06:55  --------------------------------------------------------  IN:    Enteral Tube Flush: 575 mL    Free Water: 360 mL    ns in tub fed  [id number]: 1610 mL    sodium chloride 0.9%: 250 mL    Solution: 200 mL  Total IN: 2995 mL    OUT:    Indwelling Catheter - Urethral: 550 mL  Total OUT: 550 mL    Total NET: 2445 mL               Discussed with:     Cultures:	        Radiology

## 2018-09-17 NOTE — PROGRESS NOTE ADULT - SUBJECTIVE AND OBJECTIVE BOX
[facility] -- Cory Fragoso, Aditya Bah Pannella, Patel, Savella  Office # [phone]    Follow Up: Dyspnea    Subjective/Observations: Sitting on the chair with nasal cannula.  Patient c/o PRADO and productive cough    REVIEW OF SYSTEMS: All other review of systems is negative unless indicated above    PAST MEDICAL & SURGICAL HISTORY:  AF (atrial fibrillation): Cardioversion 5/2012  GERD (gastroesophageal reflux disease)  CHF (congestive heart failure)  CAD (coronary artery disease)  Glaucoma  HTN (Hypertension)  S/P ICD (internal cardiac defibrillator) procedure  H/O excision of Zenker's diverticulum  S/P Cataract Surgery - bilateral  s/p Angioplasty with Stent - 2010  History of Rotator Cuff Surgery Right and Left  S/P Cholecystectomy  S/P TURP (Transurethral Resection of Prostate)  S/P Prostatectomy - 2005    MEDICATIONS  (STANDING):  ALBUTerol    0.083% 2.5 milliGRAM(s) Nebulizer every 6 hours  atorvastatin 40 milliGRAM(s) Oral at bedtime  clopidogrel Tablet 75 milliGRAM(s) Oral daily  digoxin     Tablet 0.125 milliGRAM(s) Oral daily  influenza   Vaccine 0.5 milliLiter(s) IntraMuscular once  metoprolol tartrate 25 milliGRAM(s) Oral two times a day  pantoprazole  Injectable 40 milliGRAM(s) IV Push daily  piperacillin/tazobactam IVPB. 3.375 Gram(s) IV Intermittent every 12 hours  timolol 0.5% Solution 1 Drop(s) Both EYES two times a day    MEDICATIONS  (PRN):  acetaminophen    Suspension .. 650 milliGRAM(s) Enteral Tube every 6 hours PRN Mild Pain (1 - 3)  guaiFENesin    Syrup 200 milliGRAM(s) Oral every 6 hours PRN Cough    Allergies    No Known Allergies    Intolerances    Vital Signs Last 24 Hrs  T(C): 36.6 (17 Sep 2018 11:32), Max: 36.7 (17 Sep 2018 00:52)  T(F): 97.8 (17 Sep 2018 11:32), Max: 98.1 (17 Sep 2018 00:52)  HR: 70 (17 Sep 2018 11:32) (69 - 78)  BP: 113/65 (17 Sep 2018 11:32) (89/50 - 113/65)  BP(mean): --  RR: 18 (17 Sep 2018 11:32) (18 - 24)  SpO2: 98% (17 Sep 2018 11:32) (95% - 100%)    I&O's Summary    16 Sep 2018 07:01  -  17 Sep 2018 07:00  --------------------------------------------------------  IN: 3090 mL / OUT: 550 mL / NET: 2540 mL    17 Sep 2018 07:01  -  17 Sep 2018 12:12  --------------------------------------------------------  IN: 285 mL / OUT: 0 mL / NET: 285 mL    PHYSICAL EXAM:  TELE: Vpaced at 70  Constitutional: NAD, awake and alert, well-developed  HEENT: Moist Mucous Membranes, Anicteric  Pulmonary: Non-labored, breath sounds are clear bilaterally, No wheezing, rales or rhonchi  Cardiovascular: Regular, S1 and S2, No murmurs, rubs, gallops or clicks  Gastrointestinal: Bowel Sounds present, soft, nontender.   Lymph: No peripheral edema. No lymphadenopathy.  Skin: scab on parietal area  Psych:  Mood & affect appropriate    LABS: All Labs Reviewed:                        9.8    12.92 )-----------( 161      ( 17 Sep 2018 07:28 )             29.9                         11.2   15.67 )-----------( 175      ( 16 Sep 2018 07:33 )             33.1                         11.6   14.80 )-----------( 200      ( 15 Sep 2018 07:22 )             34.6     17 Sep 2018 07:28    139    |  105    |  88     ----------------------------<  116    4.2     |  26     |  2.10   16 Sep 2018 07:33    142    |  107    |  80     ----------------------------<  106    4.0     |  26     |  2.20   15 Sep 2018 07:22    141    |  108    |  77     ----------------------------<  187    4.3     |  25     |  2.30     Ca    8.0        17 Sep 2018 07:28  Ca    8.4        16 Sep 2018 07:33  Ca    8.4        15 Sep 2018 07:22  Phos  2.9       14 Sep 2018 22:07  Mg     2.3       14 Sep 2018 22:07  PT/INR - ( 17 Sep 2018 07:28 )   PT: 39.1 sec;   INR: 3.50 ratio      < from: TTE Echo Doppler w/o Cont (05.20.15 @ 09:36) >     EXAM:  ECHO TTE W/O CON COMP W/DOPPLR           PROCEDURE DATE:  05/20/2015      INTERPRETATION:  Ordering Physician: PATITO MARTINEZ    Indication: Congestive heart failure    Technician: IGOR    Study Quality: Fair     Height: 190.5 cm  Weight: 95 kg  Blood Pressure: 102/60    MEASUREMENTS  IVS: 2.0 cm  PWT: 2.0 cm  LA: 4.2 cm  Aortic Root: 3.6 cm  LVIDd: 4.3 cm  LVIDs: 3.2 cm    LVEF: Approximately 40%    FINDINGS  Left Ventricle: Normal left ventricular size with moderate global   systolicdysfunction. LVEF estimated at 40%. Severe concentric left   ventricular hypertrophy.  Right Ventricle: Right ventricular enlargement with decreased function. A   pacing wire is seen in the right heart.  Left Atrium: Mild left atrial enlargement.  Right Atrium: Right atrial enlargement. The IVC is dilated and collapses   less than 50% with inspiration, suggesting elevated right atrial pressure.  Mitral Valve: Normal mitral valve. Mild mitral regurgitation.  Aortic Valve: Aortic valve sclerosis.  Tricuspid Valve: Normal tricuspid valve. Moderate tricuspid   regurgitation. Pulmonary artery systolic pressure estimated at 51 mmHg,   assuming a right atrial pressure of 15 mmHg, consistent with moderate   pulmonary hypertension.  Pulmonic Valve: Normal pulmonic valve.  Pericardium/Pleura: Small pericardial effusion. Bilateral pleural   effusions.      CONCLUSIONS:  1. Normal left ventricular size with moderate global systolic   dysfunction. LVEF estimated at 40%.   2. Severe concentric left ventricular hypertrophy.  3. Right ventricular enlargement with decreased function.   4. Biatrial enlargement.  5. Mild mitral regurgitation.  6. Moderate tricuspid regurgitation.   7. Moderate pulmonary hypertension.  8. Small pericardial effusion. Bilateral pleural effusions.    MAYITO CRAMER M.D., ATTENDING CARDIOLOGIST  This examination was interpreted on: May 21 2015 12:23P.  This document   has been electronically signed. May 21 2015 12:27P.      < end of copied text >    < from: CT Chest No Cont (09.11.18 @ 07:48) >    EXAM:  CT CHEST                            PROCEDURE DATE:  09/11/2018          INTERPRETATION:  CLINICAL INFORMATION: Dyspnea    COMPARISON: 2011    PROCEDURE:   CT of the Chest was performed without intravenous contrast.  Sagittal and coronal reformats were performed.      FINDINGS:    CHEST:     CHEST WALL AND LOWER NECK: Within normal limits  MEDIASTINUM AND DARINEL: Within normal limits  HEART: Cardiomegaly, pacemaker. Small pericardial effusion. Extensive   coronary calcifications. This workstation  VESSELS: Extensive calcifications.  LUNG AND LARGE AIRWAYS: Left lower lobe endobronchial mucoid impaction.   Nodular and tree-in-bud opacities left lower lobe, new from 2011. Small   pleural effusions, improved. Evidence of prior wedge resection right   lung. Bilateral segmental atelectasis.  VISUALIZED UPPER ABDOMEN: Within normal limits.  BONES: Spinal ankylosis.    IMPRESSION: Left lower lobe endobronchial mucoid impaction with   underlying airspace opacities, probably pneumonia, correlate for   aspiration. Follow-up to resolution.    Small pleural effusions. Small pericardial effusion.    MONTSE LOVE M.D., ATTENDING RADIOLOGIST  This document has been electronically signed. Sep 11 2018  8:30AM      < end of copied text >

## 2018-09-17 NOTE — PROGRESS NOTE ADULT - ASSESSMENT
85 years old with history of PAF on coumadin (5mg x5 days and 2.5mg x2 days/week), CAD s/p remote PCI (1 stent), Luu's esophagus, a repaired Zenker's diverticulum, hypertension, reflux, hypercholesterolemia, glaucoma, moderate NICM, HCM s/p biV ICD p/w weakness and dyspnea complaining of difficulty swallowing x a couple of days, admitted for FTT, dysphagia, elevated troponins, and TIBURCIO s/p PEG placement on 9/13.

## 2018-09-17 NOTE — PROGRESS NOTE ADULT - SUBJECTIVE AND OBJECTIVE BOX
Interval Events: Pt complains of mild incisional soreness. PEG site c/d/i  Pt is tolerating PEG feeds, no N/V.  + bm today    MEDICATIONS  (STANDING):  ALBUTerol    0.083% 2.5 milliGRAM(s) Nebulizer every 6 hours  atorvastatin 40 milliGRAM(s) Oral at bedtime  clopidogrel Tablet 75 milliGRAM(s) Oral daily  digoxin     Tablet 0.125 milliGRAM(s) Oral daily  influenza   Vaccine 0.5 milliLiter(s) IntraMuscular once  metoprolol tartrate 25 milliGRAM(s) Oral two times a day  pantoprazole  Injectable 40 milliGRAM(s) IV Push daily  piperacillin/tazobactam IVPB. 3.375 Gram(s) IV Intermittent every 12 hours  timolol 0.5% Solution 1 Drop(s) Both EYES two times a day    MEDICATIONS  (PRN):  acetaminophen    Suspension .. 650 milliGRAM(s) Enteral Tube every 6 hours PRN Mild Pain (1 - 3)  guaiFENesin    Syrup 200 milliGRAM(s) Oral every 6 hours PRN Cough      Allergies    No Known Allergies    Intolerances        Review of Systems:    General:  No wt loss, fevers, chills, night sweats,fatigue,   Eyes:  Good vision, no reported pain  ENT:  No sore throat, pain, runny nose, dysphagia  CV:  No pain, palpitations, hypo/hypertension  Resp:  No dyspnea, cough, tachypnea, wheezing  GI:  + mild incisional pain,  No nausea, No vomiting, No diarrhea, No constipation, No weight loss, No fever, No pruritis, No rectal bleeding, No melena, No dysphagia  :  No pain, bleeding, incontinence, nocturia  Muscle:  No pain, weakness  Neuro:  No weakness, tingling, memory problems  Psych:  No fatigue, insomnia, mood problems, depression  Endocrine:  No polyuria, polydypsia, cold/heat intolerance  Heme:  No petechiae, ecchymosis, easy bruisability  Skin:  No rash, tattoos, scars, edema        Vital Signs Last 24 Hrs  T(C): 36.7 (17 Sep 2018 08:27), Max: 36.7 (17 Sep 2018 00:52)  T(F): 98.1 (17 Sep 2018 08:27), Max: 98.1 (17 Sep 2018 00:52)  HR: 70 (17 Sep 2018 08:27) (69 - 78)  BP: 99/61 (17 Sep 2018 08:27) (89/50 - 109/63)  BP(mean): --  RR: 18 (17 Sep 2018 08:27) (16 - 24)  SpO2: 100% (17 Sep 2018 08:27) (95% - 100%)    PHYSICAL EXAM:    Constitutional: NAD, well-developed  HEENT: EOMI, throat clear  Neck: No LAD, supple  Respiratory: CTA and P  Cardiovascular: S1 and S2, RRR, no M  Gastrointestinal: BS+, soft, NT/ND, neg HSM, PEG c/d/i  Extremities: No peripheral edema, neg clubbing, cyanosis  Vascular: 2+ peripheral pulses  Neurological: A/O x 3, no focal deficits  Psychiatric: Normal mood, normal affect  Skin: No rashes      LABS:                        9.8    12.92 )-----------( 161      ( 17 Sep 2018 07:28 )             29.9     09-17    139  |  105  |  88<H>  ----------------------------<  116<H>  4.2   |  26  |  2.10<H>    Ca    8.0<L>      17 Sep 2018 07:28      PT/INR - ( 17 Sep 2018 07:28 )   PT: 39.1 sec;   INR: 3.50 ratio               RADIOLOGY & ADDITIONAL TESTS:

## 2018-09-17 NOTE — PROGRESS NOTE ADULT - ASSESSMENT
84 M of PAF AC, CAD s/p remote PCI Luu's esophagus, a repaired Zenker's diverticulum, hypertension, reflux, hypercholesterolemia, glaucoma, moderate NICM, HCM s/p biV ICD p/w dyspnea.  CT scan not consistent with severe volume overload; small pericardial and pleural effusions are noted.  Likely some component of aspiration  Ned are elevated but likely from demand ischemia;  BP cont to be soft ( SBP 90's)     # 1 Dyspnea  - No evidence of vascular congestion on CT chest.  However, result is evident of Pna  - Patient remains dyspneic on exertion with copious mucus production  - Continue Anx per Primary/Pulm  - continue bronchodilators  - Recommend incentive spirometer  - Chest PT    #2 CAD  - stable CAD  - Continue Plavix, BB, and statin    #3 PAF  - Continue BB and digoxin for rate control  - On daily Coumadin.  Received 2 mg last night, however, INR today=3.5.  Can hold dose today and monitor daily INR.  Dose Coumadin to keep INR 2-3    #4 TIBURCIO  - Creatinine stablizing  - Avoid nephrotoxics  - Renal following    Further cardiac workup as case unfolds  Will continue to follow    Cheryl Armstrong NP  Cardiology 84 M of PAF AC, CAD s/p remote PCI Luu's esophagus, a repaired Zenker's diverticulum, hypertension, reflux, hypercholesterolemia, glaucoma, moderate NICM, HCM s/p biV ICD p/w dyspnea.  CT scan not consistent with severe volume overload; small pericardial and pleural effusions are noted.  Likely some component of aspiration  Ned are elevated but likely from demand ischemia;  BP cont to be soft ( SBP 90's)     # 1 Dyspnea  - No evidence of vascular congestion on CT chest.  However, result is evident of Pna  - Patient remains dyspneic on exertion with copious mucus production  - Continue Anx per Primary/Pulm  - continue bronchodilators  - Recommend incentive spirometer  - Chest PT    #2 CAD  - stable CAD  - Continue Plavix, BB, and statin    #3 PAF  - Continue BB and digoxin for rate control  - On daily Coumadin.  Received 2 mg last night, however, INR today=3.5.  Can hold dose today and monitor daily INR.  Dose Coumadin to keep INR 2-3    #4 TIBURCIO  - Creatinine stablizing  - Avoid nephrotoxics  - Renal following    #5 Anemia  - H/H dropping.  Presented with H/H 12.5/35 on 9/10, today = 9.8/29.9  - Workup per primary    Further cardiac workup as case unfolds  Will continue to follow    Cheryl Armstrong NP  Cardiology 84 M of PAF AC, CAD s/p remote PCI Luu's esophagus, a repaired Zenker's diverticulum, hypertension, reflux, hypercholesterolemia, glaucoma, moderate NICM, HCM s/p biV ICD p/w dyspnea.  CT scan not consistent with severe volume overload; small pericardial and pleural effusions are noted.  Likely some component of aspiration  Ned are elevated but likely from demand ischemia;  BP cont to be soft ( SBP 90's)     # 1 Dyspnea  - No evidence of vascular congestion on CT chest.  However, result is evident of Pna  - Patient remains dyspneic on exertion with copious mucus production  - Continue Abx per Primary/Pulm  - continue bronchodilators  - Recommend incentive spirometer  - Chest PT    #2 CAD  - stable CAD  - Continue Plavix, BB, and statin    #3 PAF  - Continue BB and digoxin for rate control  - On daily Coumadin.  Received 2 mg last night, however, INR today=3.5.  Can hold dose today and monitor daily INR.  Dose Coumadin to keep INR 2-3  - Monitor closely as he is at risk for acute blood loss anemia    #4 TIBURCIO  - Creatinine stablizing  - Avoid nephrotoxics  - Renal following    #5 Anemia  - H/H dropping.  Presented with H/H 12.5/35 on 9/10, today = 9.8/29.9  - Workup per primary    Further cardiac workup as case unfolds  Will continue to follow    Cheryl Armstrong NP  Cardiology

## 2018-09-18 LAB
ANION GAP SERPL CALC-SCNC: 9 MMOL/L — SIGNIFICANT CHANGE UP (ref 5–17)
BUN SERPL-MCNC: 85 MG/DL — HIGH (ref 7–23)
CALCIUM SERPL-MCNC: 8.2 MG/DL — LOW (ref 8.5–10.1)
CHLORIDE SERPL-SCNC: 103 MMOL/L — SIGNIFICANT CHANGE UP (ref 96–108)
CO2 SERPL-SCNC: 26 MMOL/L — SIGNIFICANT CHANGE UP (ref 22–31)
CREAT SERPL-MCNC: 2 MG/DL — HIGH (ref 0.5–1.3)
GLUCOSE SERPL-MCNC: 121 MG/DL — HIGH (ref 70–99)
HCT VFR BLD CALC: 30.6 % — LOW (ref 39–50)
HGB BLD-MCNC: 10.4 G/DL — LOW (ref 13–17)
INR BLD: 3 RATIO — HIGH (ref 0.88–1.16)
MCHC RBC-ENTMCNC: 32.7 PG — SIGNIFICANT CHANGE UP (ref 27–34)
MCHC RBC-ENTMCNC: 34 GM/DL — SIGNIFICANT CHANGE UP (ref 32–36)
MCV RBC AUTO: 96.2 FL — SIGNIFICANT CHANGE UP (ref 80–100)
NRBC # BLD: 0 /100 WBCS — SIGNIFICANT CHANGE UP (ref 0–0)
OB PNL STL: NEGATIVE — SIGNIFICANT CHANGE UP
PLATELET # BLD AUTO: 158 K/UL — SIGNIFICANT CHANGE UP (ref 150–400)
POTASSIUM SERPL-MCNC: 4.4 MMOL/L — SIGNIFICANT CHANGE UP (ref 3.5–5.3)
POTASSIUM SERPL-SCNC: 4.4 MMOL/L — SIGNIFICANT CHANGE UP (ref 3.5–5.3)
PROTHROM AB SERPL-ACNC: 33.4 SEC — HIGH (ref 9.8–12.7)
RBC # BLD: 3.18 M/UL — LOW (ref 4.2–5.8)
RBC # FLD: 15.1 % — HIGH (ref 10.3–14.5)
SODIUM SERPL-SCNC: 138 MMOL/L — SIGNIFICANT CHANGE UP (ref 135–145)
WBC # BLD: 15.13 K/UL — HIGH (ref 3.8–10.5)
WBC # FLD AUTO: 15.13 K/UL — HIGH (ref 3.8–10.5)

## 2018-09-18 PROCEDURE — 99232 SBSQ HOSP IP/OBS MODERATE 35: CPT

## 2018-09-18 PROCEDURE — 99497 ADVNCD CARE PLAN 30 MIN: CPT | Mod: 25

## 2018-09-18 PROCEDURE — 99233 SBSQ HOSP IP/OBS HIGH 50: CPT | Mod: GC

## 2018-09-18 RX ORDER — WARFARIN SODIUM 2.5 MG/1
2 TABLET ORAL ONCE
Qty: 0 | Refills: 0 | Status: COMPLETED | OUTPATIENT
Start: 2018-09-18 | End: 2018-09-18

## 2018-09-18 RX ORDER — METOPROLOL TARTRATE 50 MG
25 TABLET ORAL
Qty: 0 | Refills: 0 | Status: DISCONTINUED | OUTPATIENT
Start: 2018-09-18 | End: 2018-09-20

## 2018-09-18 RX ADMIN — ATORVASTATIN CALCIUM 40 MILLIGRAM(S): 80 TABLET, FILM COATED ORAL at 21:49

## 2018-09-18 RX ADMIN — PIPERACILLIN AND TAZOBACTAM 25 GRAM(S): 4; .5 INJECTION, POWDER, LYOPHILIZED, FOR SOLUTION INTRAVENOUS at 12:58

## 2018-09-18 RX ADMIN — MIDODRINE HYDROCHLORIDE 2.5 MILLIGRAM(S): 2.5 TABLET ORAL at 17:05

## 2018-09-18 RX ADMIN — Medication 1 DROP(S): at 05:24

## 2018-09-18 RX ADMIN — ALBUTEROL 2.5 MILLIGRAM(S): 90 AEROSOL, METERED ORAL at 19:32

## 2018-09-18 RX ADMIN — ALBUTEROL 2.5 MILLIGRAM(S): 90 AEROSOL, METERED ORAL at 13:30

## 2018-09-18 RX ADMIN — PIPERACILLIN AND TAZOBACTAM 25 GRAM(S): 4; .5 INJECTION, POWDER, LYOPHILIZED, FOR SOLUTION INTRAVENOUS at 23:18

## 2018-09-18 RX ADMIN — WARFARIN SODIUM 2 MILLIGRAM(S): 2.5 TABLET ORAL at 21:49

## 2018-09-18 RX ADMIN — PIPERACILLIN AND TAZOBACTAM 25 GRAM(S): 4; .5 INJECTION, POWDER, LYOPHILIZED, FOR SOLUTION INTRAVENOUS at 00:00

## 2018-09-18 RX ADMIN — ALBUTEROL 2.5 MILLIGRAM(S): 90 AEROSOL, METERED ORAL at 07:46

## 2018-09-18 RX ADMIN — PANTOPRAZOLE SODIUM 40 MILLIGRAM(S): 20 TABLET, DELAYED RELEASE ORAL at 12:58

## 2018-09-18 RX ADMIN — CLOPIDOGREL BISULFATE 75 MILLIGRAM(S): 75 TABLET, FILM COATED ORAL at 13:01

## 2018-09-18 RX ADMIN — Medication 1 DROP(S): at 17:05

## 2018-09-18 RX ADMIN — MIDODRINE HYDROCHLORIDE 2.5 MILLIGRAM(S): 2.5 TABLET ORAL at 05:24

## 2018-09-18 RX ADMIN — Medication 0.12 MILLIGRAM(S): at 05:24

## 2018-09-18 NOTE — PROGRESS NOTE ADULT - PROBLEM SELECTOR PLAN 1
-FTT, likely 2/2 poor PO intake, dysphagia, has severe malnutrition  -S/P PEG placement and on tube feeding, tolerating well -FTT, likely 2/2 poor PO intake, dysphagia, has severe malnutrition  -S/P PEG placement and on tube feeding, tolerating well  -FOBT negative (patient's Hb was decreasing past few days)

## 2018-09-18 NOTE — PROGRESS NOTE ADULT - SUBJECTIVE AND OBJECTIVE BOX
Date/Time Patient Seen:  		  Referring MD:   Data Reviewed	       Patient is a 85y old  Male who presents with a chief complaint of sob, dyspnea, difficulty walking, dysphagia (17 Sep 2018 13:59)  in bed  seen and examined  vs and meds reviewed      Subjective/HPI     PAST MEDICAL & SURGICAL HISTORY:  AF (atrial fibrillation): Cardioversion 5/2012  GERD (gastroesophageal reflux disease)  CHF (congestive heart failure)  CAD (coronary artery disease)  Glaucoma  HTN (Hypertension)  S/P ICD (internal cardiac defibrillator) procedure  H/O excision of Zenker's diverticulum  S/P Cataract Surgery - bilateral  s/p Angioplasty with Stent - 2010  History of Rotator Cuff Surgery Right and Left  S/P Cholecystectomy  S/P TURP (Transurethral Resection of Prostate)  S/P Prostatectomy - 2005  S/P Prostatectomy  S/P Prostatectomy        Medication list         MEDICATIONS  (STANDING):  ALBUTerol    0.083% 2.5 milliGRAM(s) Nebulizer every 6 hours  atorvastatin 40 milliGRAM(s) Oral at bedtime  clopidogrel Tablet 75 milliGRAM(s) Oral daily  digoxin     Tablet 0.125 milliGRAM(s) Oral daily  influenza   Vaccine 0.5 milliLiter(s) IntraMuscular once  metoprolol tartrate 25 milliGRAM(s) Oral two times a day  midodrine 2.5 milliGRAM(s) Oral <User Schedule>  pantoprazole  Injectable 40 milliGRAM(s) IV Push daily  piperacillin/tazobactam IVPB. 3.375 Gram(s) IV Intermittent every 12 hours  timolol 0.5% Solution 1 Drop(s) Both EYES two times a day    MEDICATIONS  (PRN):  acetaminophen    Suspension .. 650 milliGRAM(s) Enteral Tube every 6 hours PRN Mild Pain (1 - 3)  guaiFENesin    Syrup 200 milliGRAM(s) Oral every 6 hours PRN Cough         Vitals log        ICU Vital Signs Last 24 Hrs  T(C): 36.5 (18 Sep 2018 04:21), Max: 36.7 (17 Sep 2018 08:27)  T(F): 97.7 (18 Sep 2018 04:21), Max: 98.1 (17 Sep 2018 08:27)  HR: 70 (18 Sep 2018 04:21) (68 - 74)  BP: 97/62 (18 Sep 2018 04:21) (92/54 - 113/65)  BP(mean): --  ABP: --  ABP(mean): --  RR: 17 (18 Sep 2018 04:21) (17 - 18)  SpO2: 97% (18 Sep 2018 04:21) (94% - 100%)           Input and Output:  I&O's Detail    16 Sep 2018 07:01  -  17 Sep 2018 07:00  --------------------------------------------------------  IN:    Enteral Tube Flush: 600 mL    Free Water: 360 mL    ns in tub fed  [id number]: 1680 mL    sodium chloride 0.9%: 250 mL    Solution: 200 mL  Total IN: 3090 mL    OUT:    Indwelling Catheter - Urethral: 550 mL  Total OUT: 550 mL    Total NET: 2540 mL      17 Sep 2018 07:01  -  18 Sep 2018 06:58  --------------------------------------------------------  IN:    Enteral Tube Flush: 625 mL    Free Water: 120 mL    ns in tub fed  [id number]: 1750 mL    Solution: 200 mL  Total IN: 2695 mL    OUT:    Indwelling Catheter - Urethral: 700 mL  Total OUT: 700 mL    Total NET: 1995 mL          Lab Data                        10.4   15.13 )-----------( 158      ( 18 Sep 2018 06:44 )             30.6     09-17    139  |  105  |  88<H>  ----------------------------<  116<H>  4.2   |  26  |  2.10<H>    Ca    8.0<L>      17 Sep 2018 07:28              Review of Systems	      Objective     Physical Examination    heart s1s2  lung dec BS  abd soft      Pertinent Lab findings & Imaging      Sherly:  NO   Adequate UO     I&O's Detail    16 Sep 2018 07:01  -  17 Sep 2018 07:00  --------------------------------------------------------  IN:    Enteral Tube Flush: 600 mL    Free Water: 360 mL    ns in tub fed  [id number]: 1680 mL    sodium chloride 0.9%: 250 mL    Solution: 200 mL  Total IN: 3090 mL    OUT:    Indwelling Catheter - Urethral: 550 mL  Total OUT: 550 mL    Total NET: 2540 mL      17 Sep 2018 07:01  -  18 Sep 2018 06:58  --------------------------------------------------------  IN:    Enteral Tube Flush: 625 mL    Free Water: 120 mL    ns in tub fed  [id number]: 1750 mL    Solution: 200 mL  Total IN: 2695 mL    OUT:    Indwelling Catheter - Urethral: 700 mL  Total OUT: 700 mL    Total NET: 1995 mL               Discussed with:     Cultures:	        Radiology

## 2018-09-18 NOTE — PROGRESS NOTE ADULT - PROBLEM SELECTOR PLAN 1
tolerating room air  oral and skin care  asp prec  HOB elev  keep sat > 88 pct  on emp ABX for poss asp pna  on NEBS  chest pt as needed  s/p PEG  TF and nutritional support  remains high risk for aspiration events  may need diuresis, defer to cardio  serial labs  serial PE  poss dc to [facility]  will follow and monitor

## 2018-09-18 NOTE — PROGRESS NOTE ADULT - PROBLEM SELECTOR PLAN 6
-HTN, chronic. has hypotension now   -started on low dose midodrine as per nephro for hypotension  -c/w Metoprolol succinate 25mg BID. with hold parameter. -HTN, chronic. has hypotension now   -c/w midodrine for hypotension  -c/w Metoprolol succinate 25mg BID. with hold parameter

## 2018-09-18 NOTE — PROGRESS NOTE ADULT - SUBJECTIVE AND OBJECTIVE BOX
Interval Events: Pt continues to tolerate PEG feeds, he denies N/V. Incisional soreness around PEG site is overall improved.     MEDICATIONS  (STANDING):  ALBUTerol    0.083% 2.5 milliGRAM(s) Nebulizer every 6 hours  atorvastatin 40 milliGRAM(s) Oral at bedtime  clopidogrel Tablet 75 milliGRAM(s) Oral daily  digoxin     Tablet 0.125 milliGRAM(s) Oral daily  influenza   Vaccine 0.5 milliLiter(s) IntraMuscular once  metoprolol tartrate 25 milliGRAM(s) Oral two times a day  midodrine 2.5 milliGRAM(s) Oral <User Schedule>  pantoprazole  Injectable 40 milliGRAM(s) IV Push daily  piperacillin/tazobactam IVPB. 3.375 Gram(s) IV Intermittent every 12 hours  timolol 0.5% Solution 1 Drop(s) Both EYES two times a day  warfarin 2 milliGRAM(s) Oral once    MEDICATIONS  (PRN):  acetaminophen    Suspension .. 650 milliGRAM(s) Enteral Tube every 6 hours PRN Mild Pain (1 - 3)  guaiFENesin    Syrup 200 milliGRAM(s) Oral every 6 hours PRN Cough      Allergies    No Known Allergies    Intolerances        Review of Systems:    General:  No wt loss, fevers, chills, night sweats,fatigue,   Eyes:  Good vision, no reported pain  ENT:  No sore throat, pain, runny nose, dysphagia  CV:  No pain, palpitations, hypo/hypertension  Resp:  No dyspnea, cough, tachypnea, wheezing  GI:  + mild incisional pain,  No nausea, No vomiting, No diarrhea, No constipation, No weight loss, No fever, No pruritis, No rectal bleeding, No melena, No dysphagia  :  No pain, bleeding, incontinence, nocturia  Muscle:  No pain, weakness  Neuro:  No weakness, tingling, memory problems  Psych:  No fatigue, insomnia, mood problems, depression  Endocrine:  No polyuria, polydypsia, cold/heat intolerance  Heme:  No petechiae, ecchymosis, easy bruisability  Skin:  No rash, tattoos, scars, edema      Vital Signs Last 24 Hrs  T(C): 36.5 (18 Sep 2018 07:17), Max: 36.7 (17 Sep 2018 21:00)  T(F): 97.7 (18 Sep 2018 07:17), Max: 98.1 (17 Sep 2018 21:00)  HR: 70 (18 Sep 2018 07:17) (68 - 74)  BP: 108/68 (18 Sep 2018 07:17) (93/60 - 113/65)  BP(mean): --  RR: 17 (18 Sep 2018 07:17) (17 - 18)  SpO2: 95% (18 Sep 2018 07:17) (94% - 99%)    PHYSICAL EXAM:    Constitutional: NAD, well-developed  HEENT: EOMI, throat clear  Neck: No LAD, supple  Respiratory: CTA and P  Cardiovascular: S1 and S2, RRR, no M  Gastrointestinal: BS+, soft, NT/ND, neg HSM, PEG c/d/i  Extremities: No peripheral edema, neg clubbing, cyanosis  Vascular: 2+ peripheral pulses  Neurological: A/O x 3, no focal deficits  Psychiatric: Normal mood, normal affect  Skin: No rashes      LABS:                        10.4   15.13 )-----------( 158      ( 18 Sep 2018 06:44 )             30.6     09-18    138  |  103  |  85<H>  ----------------------------<  121<H>  4.4   |  26  |  2.00<H>    Ca    8.2<L>      18 Sep 2018 06:44      PT/INR - ( 18 Sep 2018 06:44 )   PT: 33.4 sec;   INR: 3.00 ratio               RADIOLOGY & ADDITIONAL TESTS:

## 2018-09-18 NOTE — CHART NOTE - NSCHARTNOTEFT_GEN_A_CORE
Assessment:  patient tolerating tube feeding PEG jevity 1.2 [id number] hr (2016kcals) per RN with water flush 25ml hr.     Factors impacting intake: [ ] none [ ] nausea  [ ] vomiting [ ] diarrhea [ ] constipation  [ ]chewing problems [ x] swallowing issues  [ ] other: PEG feeding at this time    Diet Presciption: Diet, NPO with Tube Feed:   Tube Feeding Modality: Gastrostomy  Jevity 1.2 Rajendra  Total Volume for 24 Hours (mL): 1680  Continuous  Starting Tube Feed Rate {mL per Hour}: 10  Increase Tube Feed Rate by (mL): 10     Every hour  Until Goal Tube Feed Rate (mL per Hour): 70  Tube Feed Duration (in Hours): 24  Tube Feed Start Time: 10:00 (09-14-18 @ 09:49)        Current Weight: 9/17 wt 163.1#      Pertinent Medications: MEDICATIONS  (STANDING):  ALBUTerol    0.083% 2.5 milliGRAM(s) Nebulizer every 6 hours  atorvastatin 40 milliGRAM(s) Oral at bedtime  clopidogrel Tablet 75 milliGRAM(s) Oral daily  digoxin     Tablet 0.125 milliGRAM(s) Oral daily  influenza   Vaccine 0.5 milliLiter(s) IntraMuscular once  metoprolol tartrate 25 milliGRAM(s) Oral two times a day  midodrine 2.5 milliGRAM(s) Oral <User Schedule>  pantoprazole  Injectable 40 milliGRAM(s) IV Push daily  piperacillin/tazobactam IVPB. 3.375 Gram(s) IV Intermittent every 12 hours  timolol 0.5% Solution 1 Drop(s) Both EYES two times a day  warfarin 2 milliGRAM(s) Oral once      Pertinent Labs: 09-18 Na138 mmol/L Glu 121 mg/dL<H> K+ 4.4 mmol/L Cr  2.00 mg/dL<H> BUN 85 mg/dL<H> 09-14 Phos 2.9 mg/dL     Skin: deborah 16 no pressure injury    Estimated Needs:   [x ] no change since previous assessment  [ ] recalculated:     Previous Nutrition Diagnosis:    [x ] Malnutrition severe chronic     Nutrition Diagnosis is [x ] ongoing  [ ] resolved [ ] not applicable     New Nutrition Diagnosis: [x ] not applicable       Interventions:   Recommend  [ ] Change Diet To:  [ ] Nutrition Supplement  [ ] Nutrition Support  [x ] Other: continue feeding as ordered jevity 1.2 70ml hrx24 hr suggest add MVI    Monitoring and Evaluation: [ x ] weights [ x ] labs[ x ] follow up per protocol  [x ] other: follow tube feeding tolerance

## 2018-09-18 NOTE — PROGRESS NOTE ADULT - ASSESSMENT
85 years old with history of HTN, CAD, CHF, PPM, AF now admitted with wt loss, difficulty swallowing.   now abdominal pain on LUQ. Will have CT scan of the abdomen and pelvis non contrast.   Now he is S/P PEG and is on tube feeds. He developed TIBURCIO, which is most likely due to pre renal azotemia and/or Urinary retention.   Renal sonogram is normal. will stop the  IVF. renal indices are slightly better. will follow, spoke to family at bed side in detail.

## 2018-09-18 NOTE — PROGRESS NOTE ADULT - ASSESSMENT
84 M of PAF AC, CAD s/p remote PCI Luu's esophagus, a repaired Zenker's diverticulum, hypertension, reflux, hypercholesterolemia, glaucoma, moderate NICM, HCM s/p biV ICD p/w dyspnea.  CT scan not consistent with severe volume overload; small pericardial and pleural effusions are noted.  Likely some component of aspiration  Ned are elevated but likely from demand ischemia;  BP cont to be soft ( SBP 's)     # 1 Dyspnea  - No evidence of vascular congestion on CT chest.  However, result is evident of Pna  - Patient remains dyspneic on exertion with copious mucus production  - Continue Abx per Primary/Pulm  - continue bronchodilators  - Recommend incentive spirometer  - Chest PT    #2 CAD  - stable CAD  - Continue Plavix, BB, and statin    #3 PAF  - Continue BB and digoxin for rate control  - On daily Coumadin, now therapeutic at 3. Dose Coumadin to keep INR 2-3  - Monitor closely as he is at risk for acute blood loss anemia    #4 TIBURCIO  - Creatinine stablizing and improving  - Avoid nephrotoxics  - Renal following    #5 Anemia  - H/H has been dropping, but improved.  - Workup per primary    Further cardiac workup as case unfolds  Will continue to follow

## 2018-09-18 NOTE — PROGRESS NOTE ADULT - ATTENDING COMMENTS
Advance care planning d/w pt in detail. He is aware of his poor condition and multiple medical problems.   Pt is DNR/DNI and molst form in chart  C/W current treatment  Palliative care

## 2018-09-18 NOTE — PROGRESS NOTE ADULT - SUBJECTIVE AND OBJECTIVE BOX
[facility] - Cory Fragoso, Niurka, Aditya, Ermelinda, Davin Linda  Office Number:  [phone]    Patient resting comfortably in bed in NAD.  Laying flat with no respiratory distress.  No complaints of chest pain, dyspnea, palpitations, PND, or orthopnea.  Still weak.    ROS: negative unless otherwise mentioned.    Telemetry:      MEDICATIONS  (STANDING):  ALBUTerol    0.083% 2.5 milliGRAM(s) Nebulizer every 6 hours  atorvastatin 40 milliGRAM(s) Oral at bedtime  clopidogrel Tablet 75 milliGRAM(s) Oral daily  digoxin     Tablet 0.125 milliGRAM(s) Oral daily  influenza   Vaccine 0.5 milliLiter(s) IntraMuscular once  metoprolol tartrate 25 milliGRAM(s) Oral two times a day  midodrine 2.5 milliGRAM(s) Oral <User Schedule>  pantoprazole  Injectable 40 milliGRAM(s) IV Push daily  piperacillin/tazobactam IVPB. 3.375 Gram(s) IV Intermittent every 12 hours  timolol 0.5% Solution 1 Drop(s) Both EYES two times a day  warfarin 2 milliGRAM(s) Oral once    MEDICATIONS  (PRN):  acetaminophen    Suspension .. 650 milliGRAM(s) Enteral Tube every 6 hours PRN Mild Pain (1 - 3)  guaiFENesin    Syrup 200 milliGRAM(s) Oral every 6 hours PRN Cough      Allergies    No Known Allergies    Intolerances        Vital Signs Last 24 Hrs  T(C): 36.5 (18 Sep 2018 07:17), Max: 36.7 (17 Sep 2018 21:00)  T(F): 97.7 (18 Sep 2018 07:17), Max: 98.1 (17 Sep 2018 21:00)  HR: 70 (18 Sep 2018 07:17) (68 - 74)  BP: 108/68 (18 Sep 2018 07:17) (93/60 - 108/68)  BP(mean): --  RR: 17 (18 Sep 2018 07:17) (17 - 18)  SpO2: 95% (18 Sep 2018 07:17) (94% - 99%)    I&O's Summary    17 Sep 2018 07:01  -  18 Sep 2018 07:00  --------------------------------------------------------  IN: 2695 mL / OUT: 700 mL / NET: 1995 mL        ON EXAM:    Constitutional: NAD, awake and alert, well-developed  HEENT: Moist Mucous Membranes, Anicteric  Pulmonary: Non-labored, breath sounds are clear bilaterally, No wheezing, rales or rhonchi  Cardiovascular: Regular, S1 and S2, No murmurs, rubs, gallops or clicks  Gastrointestinal: Bowel Sounds present, soft, nontender.   Lymph: No peripheral edema. No lymphadenopathy.  Skin: scab on parietal area  Psych:  Mood & affect appropriate      LABS: All Labs Reviewed:                        10.4   15.13 )-----------( 158      ( 18 Sep 2018 06:44 )             30.6                         9.8    12.92 )-----------( 161      ( 17 Sep 2018 07:28 )             29.9                         11.2   15.67 )-----------( 175      ( 16 Sep 2018 07:33 )             33.1     18 Sep 2018 06:44    138    |  103    |  85     ----------------------------<  121    4.4     |  26     |  2.00   17 Sep 2018 07:28    139    |  105    |  88     ----------------------------<  116    4.2     |  26     |  2.10   16 Sep 2018 07:33    142    |  107    |  80     ----------------------------<  106    4.0     |  26     |  2.20     Ca    8.2        18 Sep 2018 06:44  Ca    8.0        17 Sep 2018 07:28  Ca    8.4        16 Sep 2018 07:33      PT/INR - ( 18 Sep 2018 06:44 )   PT: 33.4 sec;   INR: 3.00 ratio               Blood Culture:

## 2018-09-18 NOTE — PROGRESS NOTE ADULT - SUBJECTIVE AND OBJECTIVE BOX
KATE ZAFAR  85y  Male    Patient is a 85y old  Male who presents with a chief complaint of sob, dyspnea, difficulty walking, dysphagia (18 Sep 2018 12:47)      out of bed to chair.   denies any chest pain but has severe abdominal pain.       PAST MEDICAL & SURGICAL HISTORY:  AF (atrial fibrillation): Cardioversion 5/2012  GERD (gastroesophageal reflux disease)  CHF (congestive heart failure)  CAD (coronary artery disease)  Glaucoma  HTN (Hypertension)  S/P ICD (internal cardiac defibrillator) procedure  H/O excision of Zenker's diverticulum  S/P Cataract Surgery - bilateral  s/p Angioplasty with Stent - 2010  History of Rotator Cuff Surgery Right and Left  S/P Cholecystectomy  S/P TURP (Transurethral Resection of Prostate)  S/P Prostatectomy - 2005          PHYSICAL EXAM:    T(C): 36.5 (09-18-18 @ 11:20), Max: 36.7 (09-17-18 @ 21:00)  HR: 72 (09-18-18 @ 13:30) (68 - 74)  BP: 96/58 (09-18-18 @ 11:20) (93/60 - 108/68)  RR: 18 (09-18-18 @ 11:20) (17 - 18)  SpO2: 98% (09-18-18 @ 13:30) (94% - 100%)  Wt(kg): --    I&O's Detail    17 Sep 2018 07:01  -  18 Sep 2018 07:00  --------------------------------------------------------  IN:    Enteral Tube Flush: 625 mL    Free Water: 120 mL    ns in tub fed  [id number]: 1750 mL    Solution: 200 mL  Total IN: 2695 mL    OUT:    Indwelling Catheter - Urethral: 700 mL  Total OUT: 700 mL    Total NET: 1995 mL      18 Sep 2018 07:01  -  18 Sep 2018 15:44  --------------------------------------------------------  IN:    Enteral Tube Flush: 175 mL    Free Water: 50 mL    ns in tub fed  [id number]: 492 mL    Solution: 75 mL  Total IN: 792 mL    OUT:    Indwelling Catheter - Urethral: 350 mL  Total OUT: 350 mL    Total NET: 442 mL          Respiratory: clear anteriorly, decreased BS at bases  Cardiovascular: S1 S2  Gastrointestinal: soft but tender LUQ  Extremities: trace edema   Neuro: Awake and alert    MEDICATIONS  (STANDING):  ALBUTerol    0.083% 2.5 milliGRAM(s) Nebulizer every 6 hours  atorvastatin 40 milliGRAM(s) Oral at bedtime  clopidogrel Tablet 75 milliGRAM(s) Oral daily  digoxin     Tablet 0.125 milliGRAM(s) Oral daily  influenza   Vaccine 0.5 milliLiter(s) IntraMuscular once  metoprolol tartrate 25 milliGRAM(s) Oral two times a day  midodrine 2.5 milliGRAM(s) Oral <User Schedule>  pantoprazole  Injectable 40 milliGRAM(s) IV Push daily  piperacillin/tazobactam IVPB. 3.375 Gram(s) IV Intermittent every 12 hours  timolol 0.5% Solution 1 Drop(s) Both EYES two times a day  warfarin 2 milliGRAM(s) Oral once    MEDICATIONS  (PRN):  acetaminophen    Suspension .. 650 milliGRAM(s) Enteral Tube every 6 hours PRN Mild Pain (1 - 3)  guaiFENesin    Syrup 200 milliGRAM(s) Oral every 6 hours PRN Cough                            10.4   15.13 )-----------( 158      ( 18 Sep 2018 06:44 )             30.6       09-18    138  |  103  |  85<H>  ----------------------------<  121<H>  4.4   |  26  |  2.00<H>    Ca    8.2<L>      18 Sep 2018 06:44

## 2018-09-18 NOTE — PROGRESS NOTE ADULT - SUBJECTIVE AND OBJECTIVE BOX
Resident Progress Note    Patient is a 85y old  Male who presents with a chief complaint of sob, dyspnea, difficulty walking, dysphagia (18 Sep 2018 10:09)    HPI: Patient seen and examined at bedside. No acute events overnight. Patient states he feels weak and continues to endorse lower abdominal pain over lower ribs likely MSK related 2/2 cough.  No other acute complaints.     ROS:  CONSTITUTIONAL: No weakness, fevers or chills  EYES/ENT: No visual changes, no throat pain   RESPIRATORY: + cough, wheezing, denies hemoptysis, shortness of breath  CARDIOVASCULAR: No chest pain or palpitations  GASTROINTESTINAL: admits to abdominal pain over LUQ ribs 8-10, denies nausea, vomiting, or hematemesis; No diarrhea or constipation. No melena or hematochezia.  GENITOURINARY: No dysuria, frequency or hematuria  NEUROLOGICAL: No dizziness, numbness, or weakness  All other review of systems is negative unless indicated above.    Vital Signs Last 24 Hrs  T(C): 36.5 (09-18-18 @ 07:17), Max: 36.7 (09-17-18 @ 21:00)  T(F): 97.7 (09-18-18 @ 07:17), Max: 98.1 (09-17-18 @ 21:00)  HR: 70 (09-18-18 @ 07:17) (68 - 74)  BP: 108/68 (09-18-18 @ 07:17) (93/60 - 113/65)  RR: 17 (09-18-18 @ 07:17) (17 - 18)  SpO2: 95% (09-18-18 @ 07:17) (94% - 99%)    Physical Exam:  HEENT: NC/AT, EOMI, neck supple, MMM, large wound with eschar on scalp  RESPIRATORY: CTA b/l, diminished breath sounds at the bases b/l  CARDIOVASCULAR: RRR, no murmurs, gallops, rubs  ABDOMINAL: soft, non-tender, non-distended, positive bowel sounds, PEG+ with dressing, c/d/i  EXTREMITIES: no clubbing, cyanosis, or edema    LABS:                        10.4   15.13 )-----------( 158      ( 18 Sep 2018 06:44 )             30.6     18 Sep 2018 06:44    138    |  103    |  85     ----------------------------<  121    4.4     |  26     |  2.00     Ca    8.2        18 Sep 2018 06:44      PT/INR - ( 18 Sep 2018 06:44 )   PT: 33.4 sec;   INR: 3.00 ratio      MEDICATIONS  (STANDING):  ALBUTerol    0.083% 2.5 milliGRAM(s) Nebulizer every 6 hours  atorvastatin 40 milliGRAM(s) Oral at bedtime  clopidogrel Tablet 75 milliGRAM(s) Oral daily  digoxin     Tablet 0.125 milliGRAM(s) Oral daily  influenza   Vaccine 0.5 milliLiter(s) IntraMuscular once  metoprolol tartrate 25 milliGRAM(s) Oral two times a day  midodrine 2.5 milliGRAM(s) Oral <User Schedule>  pantoprazole  Injectable 40 milliGRAM(s) IV Push daily  piperacillin/tazobactam IVPB. 3.375 Gram(s) IV Intermittent every 12 hours  timolol 0.5% Solution 1 Drop(s) Both EYES two times a day  warfarin 2 milliGRAM(s) Oral once    MEDICATIONS  (PRN):  acetaminophen    Suspension .. 650 milliGRAM(s) Enteral Tube every 6 hours PRN Mild Pain (1 - 3)  guaiFENesin    Syrup 200 milliGRAM(s) Oral every 6 hours PRN Cough    Allergies  No Known Allergies

## 2018-09-18 NOTE — PROGRESS NOTE ADULT - PROBLEM SELECTOR PLAN 3
-PAF on home coumadin (5mg x5 days/week, 2.5mg x2 days/week)  -Continue digoxin 0.125mcg  -Coumadin 2mg today as INR was 3  -cardio (Ermelinda) recs appreciated

## 2018-09-18 NOTE — PROGRESS NOTE ADULT - PROBLEM SELECTOR PLAN 7
-HFpEF (EF 40%). Does not appear overloaded at this time.   -lasix on hold now for TIBURCIO and hypotension  -Monitor volume status closely  -strict Is and Os

## 2018-09-19 LAB
ANION GAP SERPL CALC-SCNC: 8 MMOL/L — SIGNIFICANT CHANGE UP (ref 5–17)
BUN SERPL-MCNC: 81 MG/DL — HIGH (ref 7–23)
CALCIUM SERPL-MCNC: 8.4 MG/DL — LOW (ref 8.5–10.1)
CHLORIDE SERPL-SCNC: 105 MMOL/L — SIGNIFICANT CHANGE UP (ref 96–108)
CO2 SERPL-SCNC: 27 MMOL/L — SIGNIFICANT CHANGE UP (ref 22–31)
CREAT SERPL-MCNC: 1.7 MG/DL — HIGH (ref 0.5–1.3)
GLUCOSE SERPL-MCNC: 90 MG/DL — SIGNIFICANT CHANGE UP (ref 70–99)
HCT VFR BLD CALC: 31.2 % — LOW (ref 39–50)
HGB BLD-MCNC: 10.6 G/DL — LOW (ref 13–17)
INR BLD: 1.91 RATIO — HIGH (ref 0.88–1.16)
MCHC RBC-ENTMCNC: 32.3 PG — SIGNIFICANT CHANGE UP (ref 27–34)
MCHC RBC-ENTMCNC: 34 GM/DL — SIGNIFICANT CHANGE UP (ref 32–36)
MCV RBC AUTO: 95.1 FL — SIGNIFICANT CHANGE UP (ref 80–100)
NRBC # BLD: 0 /100 WBCS — SIGNIFICANT CHANGE UP (ref 0–0)
PLATELET # BLD AUTO: 170 K/UL — SIGNIFICANT CHANGE UP (ref 150–400)
POTASSIUM SERPL-MCNC: 4.6 MMOL/L — SIGNIFICANT CHANGE UP (ref 3.5–5.3)
POTASSIUM SERPL-SCNC: 4.6 MMOL/L — SIGNIFICANT CHANGE UP (ref 3.5–5.3)
PROCALCITONIN SERPL-MCNC: 0.11 NG/ML — HIGH (ref 0–0.04)
PROTHROM AB SERPL-ACNC: 21.1 SEC — HIGH (ref 9.8–12.7)
RBC # BLD: 3.28 M/UL — LOW (ref 4.2–5.8)
RBC # FLD: 15.2 % — HIGH (ref 10.3–14.5)
SODIUM SERPL-SCNC: 140 MMOL/L — SIGNIFICANT CHANGE UP (ref 135–145)
WBC # BLD: 13.35 K/UL — HIGH (ref 3.8–10.5)
WBC # FLD AUTO: 13.35 K/UL — HIGH (ref 3.8–10.5)

## 2018-09-19 PROCEDURE — 99232 SBSQ HOSP IP/OBS MODERATE 35: CPT

## 2018-09-19 PROCEDURE — 74176 CT ABD & PELVIS W/O CONTRAST: CPT | Mod: 26

## 2018-09-19 PROCEDURE — 71045 X-RAY EXAM CHEST 1 VIEW: CPT | Mod: 26

## 2018-09-19 PROCEDURE — 99233 SBSQ HOSP IP/OBS HIGH 50: CPT

## 2018-09-19 RX ORDER — WARFARIN SODIUM 2.5 MG/1
5 TABLET ORAL ONCE
Qty: 0 | Refills: 0 | Status: COMPLETED | OUTPATIENT
Start: 2018-09-19 | End: 2018-09-19

## 2018-09-19 RX ADMIN — Medication 650 MILLIGRAM(S): at 11:04

## 2018-09-19 RX ADMIN — WARFARIN SODIUM 5 MILLIGRAM(S): 2.5 TABLET ORAL at 22:51

## 2018-09-19 RX ADMIN — Medication 650 MILLIGRAM(S): at 11:34

## 2018-09-19 RX ADMIN — PIPERACILLIN AND TAZOBACTAM 25 GRAM(S): 4; .5 INJECTION, POWDER, LYOPHILIZED, FOR SOLUTION INTRAVENOUS at 11:04

## 2018-09-19 RX ADMIN — MIDODRINE HYDROCHLORIDE 2.5 MILLIGRAM(S): 2.5 TABLET ORAL at 05:24

## 2018-09-19 RX ADMIN — ATORVASTATIN CALCIUM 40 MILLIGRAM(S): 80 TABLET, FILM COATED ORAL at 22:51

## 2018-09-19 RX ADMIN — CLOPIDOGREL BISULFATE 75 MILLIGRAM(S): 75 TABLET, FILM COATED ORAL at 11:04

## 2018-09-19 RX ADMIN — ALBUTEROL 2.5 MILLIGRAM(S): 90 AEROSOL, METERED ORAL at 14:12

## 2018-09-19 RX ADMIN — Medication 0.12 MILLIGRAM(S): at 05:24

## 2018-09-19 RX ADMIN — Medication 1 DROP(S): at 17:34

## 2018-09-19 RX ADMIN — ALBUTEROL 2.5 MILLIGRAM(S): 90 AEROSOL, METERED ORAL at 20:45

## 2018-09-19 RX ADMIN — PIPERACILLIN AND TAZOBACTAM 25 GRAM(S): 4; .5 INJECTION, POWDER, LYOPHILIZED, FOR SOLUTION INTRAVENOUS at 23:59

## 2018-09-19 RX ADMIN — PANTOPRAZOLE SODIUM 40 MILLIGRAM(S): 20 TABLET, DELAYED RELEASE ORAL at 11:06

## 2018-09-19 RX ADMIN — MIDODRINE HYDROCHLORIDE 2.5 MILLIGRAM(S): 2.5 TABLET ORAL at 17:34

## 2018-09-19 RX ADMIN — Medication 1 DROP(S): at 05:24

## 2018-09-19 NOTE — PROGRESS NOTE ADULT - SUBJECTIVE AND OBJECTIVE BOX
CHIEF COMPLAINT: Patient is a 85y old  Male who presents with a chief complaint of sob, dyspnea, difficulty walking, dysphagia (19 Sep 2018 11:49)      HPI:  85 years old with history of PAF on coumadin (5mg x5 days and 2.5mg x2 days/week), CAD s/p remote PCI (1 stent), Luu's esophagus, a repaired Zenker's diverticulum, hypertension, reflux, hypercholesterolemia, glaucoma, moderate NICM, HCM s/p biV ICD p/w weakness and dyspnea. He has been complaining of more difficulty swallowing x a couple of days. He cannot swallow water at this point. He reports losing 18lbs since May but admits he has no appetite. He notes that he has gotten more dyspneic recently and can only walk a few feet. Despite using a cane, he reports difficulty walking. Recently he had his aldactone reduced by half because of weakness by Dr. Burgess. His PPM was recently interrogated (2 weeks ago). He has mild lower ext edema that is unchanged. Additionally, he reports that he has a small wound on his 2nd digit of right foot that is being managed by outpatient podiatrist. Denies any   palpitations, PND, orthopnea, near syncope, syncope,  stroke like symptoms, pain with swallowing.     In the ED, the patient's vital signs were T: 98, HR 60, /70, R: 16, SpO2 98% on RA. CBC WNL. PT/INR/PTT 43.1/3.85/47.0. CMP significant for BUN/Cr 52/1.80. Albumin 3.0. CE's significant for Troponin 0.245, CKMB 3.4. Serum BNP 13167. CXR: Stable cardiomegaly with atherosclerotic aorta. Left cardiac pacer reidentified in situ. Hyperinflated lungs. Fibrotic atelectatic changes right mid and lower lung field similar to prior. New small right and small-to-moderate left pleural effusion. Suture anchors project over the bilateral shoulders. EKG: . Dr. Linda evaluated patient in ED. UA pending. (10 Sep 2018 19:01)      Follow Up: Dyspnea    Interval History: Patient seen and examined,  resting comfortably in bed in NAD.  Laying flat with no respiratory distress.  No complaints of chest pain, dyspnea, palpitations, PND, or orthopnea. Still c/o of feeling weak.    EKG:  < from: 12 Lead ECG (09.10.18 @ 17:39) >  Ventricular Rate 70 BPM    Atrial Rate 70 BPM    QRS Duration 154 ms    Q-T Interval 436 ms    QTC Calculation(Bezet) 470 ms    R Axis -53 degrees    T Axis -78 degrees    Diagnosis Line Ventricular-paced rhythm      \REVIEW OF SYSTEMS:    CONSTITUTIONAL: + weakness, fevers or chills  EYES/ENT: No visual changes;  No vertigo or throat pain   NECK: No pain or stiffness  RESPIRATORY: No cough, wheezing, hemoptysis; No shortness of breath  CARDIOVASCULAR: No chest pain or palpitations  GASTROINTESTINAL: No abdominal or epigastric pain. No nausea, vomiting, or hematemesis; No diarrhea or constipation. No melena or hematochezia.  GENITOURINARY: No dysuria, frequency or hematuria  NEUROLOGICAL: No numbness or weakness  SKIN: No itching, rashes      PAST MEDICAL & SURGICAL HISTORY:  AF (atrial fibrillation): Cardioversion 5/2012  GERD (gastroesophageal reflux disease)  CHF (congestive heart failure)  CAD (coronary artery disease)  Glaucoma  HTN (Hypertension)  S/P ICD (internal cardiac defibrillator) procedure  H/O excision of Zenker's diverticulum  S/P Cataract Surgery - bilateral  s/p Angioplasty with Stent - 2010  History of Rotator Cuff Surgery Right and Left  S/P Cholecystectomy  S/P TURP (Transurethral Resection of Prostate)  S/P Prostatectomy - 2005      SOCIAL HISTORY:  No tobacco, ethanol, or drug abuse.    FAMILY HISTORY:  No pertinent family history in first degree relatives    No family history of acute MI or sudden cardiac death.    MEDICATIONS  (STANDING):  ALBUTerol    0.083% 2.5 milliGRAM(s) Nebulizer every 6 hours  atorvastatin 40 milliGRAM(s) Oral at bedtime  clopidogrel Tablet 75 milliGRAM(s) Oral daily  digoxin     Tablet 0.125 milliGRAM(s) Oral daily  influenza   Vaccine 0.5 milliLiter(s) IntraMuscular once  metoprolol tartrate 25 milliGRAM(s) Oral two times a day  midodrine 2.5 milliGRAM(s) Oral <User Schedule>  pantoprazole  Injectable 40 milliGRAM(s) IV Push daily  piperacillin/tazobactam IVPB. 3.375 Gram(s) IV Intermittent every 12 hours  timolol 0.5% Solution 1 Drop(s) Both EYES two times a day  warfarin 5 milliGRAM(s) Oral once    MEDICATIONS  (PRN):  acetaminophen    Suspension .. 650 milliGRAM(s) Enteral Tube every 6 hours PRN Mild Pain (1 - 3)  guaiFENesin    Syrup 200 milliGRAM(s) Oral every 6 hours PRN Cough      Allergies    No Known Allergies    Intolerances      Home meds:  Home Medications:  atorvastatin 40 mg oral tablet: 1 tab(s) orally once a day (at bedtime) (10 Sep 2018 20:24)  clopidogrel 75 mg oral tablet: 1 tab(s) orally once a day (10 Sep 2018 20:24)  digoxin 125 mcg (0.125 mg) oral tablet: 1 tab(s) orally once a day (10 Sep 2018 20:24)  Lasix 40 mg oral tablet: 1 tab(s) orally once a day (10 Sep 2018 20:24)  metoprolol succinate 25 mg oral tablet, extended release: orally 2 times a day (10 Sep 2018 20:24)  spironolactone 25 mg oral tablet: orally once a day (10 Sep 2018 20:24)  timolol hemihydrate 0.5% ophthalmic solution: 1 drop(s) to each affected eye 2 times a day (10 Sep 2018 20:24)  warfarin 2.5 mg oral tablet: 1 tab(s) orally 2 times a week (10 Sep 2018 20:24)  warfarin 5 mg oral tablet: orally 5 times a week (10 Sep 2018 20:24)        VITAL SIGNS:   Vital Signs Last 24 Hrs  T(C): 36.4 (19 Sep 2018 11:40), Max: 36.9 (19 Sep 2018 05:09)  T(F): 97.5 (19 Sep 2018 11:40), Max: 98.5 (19 Sep 2018 05:09)  HR: 70 (19 Sep 2018 14:15) (68 - 76)  BP: 84/54 (19 Sep 2018 11:40) (84/54 - 109/65)  BP(mean): --  RR: 17 (19 Sep 2018 11:40) (17 - 18)  SpO2: 97% (19 Sep 2018 14:15) (96% - 99%)    I&O's Summary    18 Sep 2018 07:01  -  19 Sep 2018 07:00  --------------------------------------------------------  IN: 1917 mL / OUT: 925 mL / NET: 992 mL    19 Sep 2018 07:01  -  19 Sep 2018 14:58  --------------------------------------------------------  IN: 380 mL / OUT: 400 mL / NET: -20 mL      On Exam:  TELE: V Paced, triplets in pairs, @ 0450 8 beats wct  Constitutional: NAD, awake and alert, well-developed  HEENT: Moist Mucous Membranes, Anicteric  Pulmonary: Non-labored, breath sounds are clear bilaterally, No wheezing, rales or rhonchi  Cardiovascular: Regular, S1 and S2, No murmurs, rubs, gallops or clicks  Gastrointestinal: Bowel Sounds present, soft, nontender.   Lymph: No peripheral edema. No lymphadenopathy.  Skin: No visible rashes or ulcers.  Psych:  Mood & affect appropriate      LABS: All Labs Reviewed:                        10.6   13.35 )-----------( 170      ( 19 Sep 2018 06:28 )             31.2                         10.4   15.13 )-----------( 158      ( 18 Sep 2018 06:44 )             30.6                         9.8    12.92 )-----------( 161      ( 17 Sep 2018 07:28 )             29.9     19 Sep 2018 06:28    140    |  105    |  81     ----------------------------<  90     4.6     |  27     |  1.70   18 Sep 2018 06:44    138    |  103    |  85     ----------------------------<  121    4.4     |  26     |  2.00   17 Sep 2018 07:28    139    |  105    |  88     ----------------------------<  116    4.2     |  26     |  2.10     Ca    8.4        19 Sep 2018 06:28  Ca    8.2        18 Sep 2018 06:44  Ca    8.0        17 Sep 2018 07:28      PT/INR - ( 19 Sep 2018 06:28 )   PT: 21.1 sec;   INR: 1.91 ratio        Blood Culture:     ECHO:  < from: TTE Echo Doppler w/o Cont (05.20.15 @ 09:36) >  CONCLUSIONS:  1. Normal left ventricular size with moderate global systolic   dysfunction. LVEF estimated at 40%.   2. Severe concentric left ventricular hypertrophy.  3. Right ventricular enlargement with decreased function.   4. Biatrial enlargement.  5. Mild mitral regurgitation.  6. Moderate tricuspid regurgitation.   7. Moderate pulmonary hypertension.  8. Small pericardial effusion. Bilateral pleural effusions.      RADIOLOGY:  < from: Xray Chest 1 View- PORTABLE-Routine (09.19.18 @ 10:02) >    There is significant worsening in bibasilar airspace disease. Correlate   for pneumonia and/or pulmonary edema. Small right pleural effusion   similar to prior. Moderate left pleural effusion slightly increased.   Otherwise no change.    < from: CT Abdomen and Pelvis No Cont (09.19.18 @ 07:39) >  IMPRESSION: No bowel obstruction or bowel inflammation.    Small pericardial effusion.    Other chronic nonemergent findings, as discussed in the body of the   report.

## 2018-09-19 NOTE — CONSULT NOTE ADULT - SUBJECTIVE AND OBJECTIVE BOX
HPI:  85 years old with history of PAF on coumadin (5mg x5 days and 2.5mg x2 days/week), CAD s/p   PCI Luu's esophagus, Zenker's diverticulum sp repair, HTN HLD CHF with biV ICD scalp SCC  p/w weakness and PRADO. Denies fever chills n/v/d. Also with c/o dysphagia. Pt is sp PEG 9/13/18.  Has been on Zosyn since 9/13/18 for poss asp pna. Pt complained of Left upper abd pressure  CXR showed worsening infiltrates and CT A/P showed effusion and atelectasis.    Infectious Disease consult was called to evaluate pt and for antibiotic management.    Past Medical & Surgical Hx:  PAST MEDICAL & SURGICAL HISTORY:  AF (atrial fibrillation): Cardioversion 5/2012  GERD (gastroesophageal reflux disease)  CHF (congestive heart failure)  CAD (coronary artery disease)  Glaucoma  HTN (Hypertension)  S/P ICD (internal cardiac defibrillator) procedure  H/O excision of Zenker's diverticulum  S/P Cataract Surgery - bilateral  s/p Angioplasty with Stent - 2010  History of Rotator Cuff Surgery Right and Left  S/P Cholecystectomy  S/P TURP (Transurethral Resection of Prostate)  S/P Prostatectomy - 2005      Social History--  EtOH: denies   Tobacco: denies   Drug Use: denies       FAMILY HISTORY:  No pertinent family history in first degree relatives    Allergies  No Known Allergies    Home Medications:  atorvastatin 40 mg oral tablet: 1 tab(s) orally once a day (at bedtime) (10 Sep 2018 20:24)  clopidogrel 75 mg oral tablet: 1 tab(s) orally once a day (10 Sep 2018 20:24)  digoxin 125 mcg (0.125 mg) oral tablet: 1 tab(s) orally once a day (10 Sep 2018 20:24)  Lasix 40 mg oral tablet: 1 tab(s) orally once a day (10 Sep 2018 20:24)  metoprolol succinate 25 mg oral tablet, extended release: orally 2 times a day (10 Sep 2018 20:24)  spironolactone 25 mg oral tablet: orally once a day (10 Sep 2018 20:24)  timolol hemihydrate 0.5% ophthalmic solution: 1 drop(s) to each affected eye 2 times a day (10 Sep 2018 20:24)  warfarin 2.5 mg oral tablet: 1 tab(s) orally 2 times a week (10 Sep 2018 20:24)  warfarin 5 mg oral tablet: orally 5 times a week (10 Sep 2018 20:24)      Current Inpatient Medications :    ANTIBIOTICS:   piperacillin/tazobactam IVPB. 3.375 Gram(s) IV Intermittent every 12 hours      OTHER RELEVANT MEDICATIONS :  acetaminophen    Suspension .. 650 milliGRAM(s) Enteral Tube every 6 hours PRN  ALBUTerol    0.083% 2.5 milliGRAM(s) Nebulizer every 6 hours  atorvastatin 40 milliGRAM(s) Oral at bedtime  clopidogrel Tablet 75 milliGRAM(s) Oral daily  digoxin     Tablet 0.125 milliGRAM(s) Oral daily  guaiFENesin    Syrup 200 milliGRAM(s) Oral every 6 hours PRN  influenza   Vaccine 0.5 milliLiter(s) IntraMuscular once  metoprolol tartrate 25 milliGRAM(s) Oral two times a day  midodrine 2.5 milliGRAM(s) Oral <User Schedule>  pantoprazole  Injectable 40 milliGRAM(s) IV Push daily  timolol 0.5% Solution 1 Drop(s) Both EYES two times a day  warfarin 5 milliGRAM(s) Oral once    ROS:  CONSTITUTIONAL:  Negative fever or chills  EYES:  Negative  blurry vision or double vision  CARDIOVASCULAR:  Negative for chest pain or palpitations  RESPIRATORY:  Negative for cough, wheezing, or SOB   GASTROINTESTINAL:  Negative for nausea, vomiting, diarrhea, constipation, or abdominal pain  GENITOURINARY:  Negative frequency, urgency , dysuria or hematuria   NEUROLOGIC:  No headache, confusion, dizziness, lightheadedness  All other systems were reviewed and are negative      I&O's Detail    18 Sep 2018 07:01  -  19 Sep 2018 07:00  --------------------------------------------------------  IN:    Enteral Tube Flush: 425 mL    Free Water: 100 mL    ns in tub fed  [id number]: 1192 mL    Solution: 200 mL  Total IN: 1917 mL    OUT:    Indwelling Catheter - Urethral: 925 mL  Total OUT: 925 mL    Total NET: 992 mL      19 Sep 2018 07:01  -  19 Sep 2018 17:35  --------------------------------------------------------  IN:    Enteral Tube Flush: 250 mL    ns in tub fed  [id number]: 700 mL    Solution: 100 mL  Total IN: 1050 mL    OUT:    Voided: 400 mL  Total OUT: 400 mL    Total NET: 650 mL    Physical Exam:  Vital Signs Last 24 Hrs  T(C): 36.4 (19 Sep 2018 16:05), Max: 36.9 (19 Sep 2018 05:09)  T(F): 97.6 (19 Sep 2018 16:05), Max: 98.5 (19 Sep 2018 05:09)  HR: 69 (19 Sep 2018 16:05) (68 - 76)  BP: 94/54 (19 Sep 2018 16:05) (84/54 - 104/66)  BP(mean): --  RR: 19 (19 Sep 2018 16:05) (17 - 19)  SpO2: 98% (19 Sep 2018 16:05) (96% - 99%)    General: in no acute distress frontal scalp with squamous cell ca noted  Eyes: sclera anicteric, pupils equal and reactive to light  ENMT: buccal mucosa moist, pharynx not injected  Neck: supple, trachea midline  Lungs: Decreased , no wheeze/rhonchi  Cardiovascular: regular rate and rhythm, S1 S2  Abdomen: soft, nontender, +PEG, bowel sounds normal  Neurological:  alert and oriented x3, Cranial Nerves II-XII grossly intact  Skin:no increased ecchymosis/petechiae/purpura  Lymph Nodes: no palpable cervical/supraclavicular lymph nodes enlargements  Extremities: no cyanosis/clubbing/edema    Labs:                         10.6   13.35 )-----------( 170      ( 19 Sep 2018 06:28 )             31.2   09-19    140  |  105  |  81<H>  ----------------------------<  90  4.6   |  27  |  1.70<H>    Ca    8.4<L>      19 Sep 2018 06:28    RECENT CULTURES:  Culture - Blood (05.19.15 @ 09:52)    Specimen Source: .Blood Blood    Culture Results:   No growth at 5 days.    Culture - Blood (05.19.15 @ 09:51)    Specimen Source: .Blood Blood    Culture Results:   No growth at 5 days.    RADIOLOGY & ADDITIONAL STUDIES:    EXAM:  CT ABDOMEN AND PELVIS                            PROCEDURE DATE:  09/19/2018          INTERPRETATION:  .    CLINICAL INFORMATION: Left upper quadrant pain and tenderness.    TECHNIQUE: Helical axial images were obtained from the domes of the  diaphragm through the pubic symphysis without the administration of IV or   oral contrast. Sagittal and coronal reformatted images were obtained from   the source data.    COMPARISON: No prior CT examinations of the abdomen and pelvis are   available for comparison.     FINDINGS: Evaluation of the heart, vascular structures, and   intra-abdominal organs is limited without the administration of IV   contrast. The heart size is enlarged. A small pericardial effusion is   notable. There are atherosclerotic calcifications of the imaged coronary   arteries, aorta, and branch vessels. The imaged portions of the aorta are   normal in caliber.    Moderate-sized bilateral pleural effusions are notable with adjacent   atelectasis. Tiny calcified granulomas are noted within the left lower   lobe.    The unenhanced appearance to the liver, spleen, pancreas, and adrenal   glands appear unremarkable. The patient is status post cholecystectomy.    An exophytic left-sided renal cyst is noted. There is nonspecific   bilateral perinephric stranding. There is no hydroureteronephrosis. The   patient is status post Dubois catheter placement with the Dubois balloon   inside a decompressed urinary bladder. Intraluminal urinary bladder air   is compatible with Dubois catheter placement.    There is no abdominal or pelvic lymphadenopathy.    The patient is status post percutaneous gastrostomy tube placement with   the gastrostomy balloon inside the lumen of the stomach. There is no   bowel obstruction. High density contrast material is notable within the   large bowel loops from a prior contrast study. Colonic diverticulosis is   noted. The appendix is normal.    The prostate gland and seminal vesicles appear unremarkable.     A left-sided total hip arthroplasty is noted. There is diffuse   osteopenia. Multilevel degenerative changes are noted within the imaged   potions of the spine.    Trace anasarca is notable. There is symmetric bilateral gynecomastia.    IMPRESSION: No bowel obstruction or bowel inflammation.    Small pericardial effusion.    Other chronic nonemergent findings, as discussed in the body of the   report.      EXAM:  XR CHEST PORTABLE ROUTINE 1V                            PROCEDURE DATE:  09/19/2018        INTERPRETATION:  Coughing.    AP chest. Prior 9/10/2018.    There is significant worsening in bibasilar airspace disease. Correlate   for pneumonia and/or pulmonary edema. Small right pleural effusion   similar to prior. Moderate left pleural effusion slightly increased.   Otherwise no change.    Impression: As above    Assessment :   85 years old with history of PAF on coumadin (5mg x5 days and 2.5mg x2 days/week), CAD s/p   PCI Luu's esophagus, Zenker's diverticulum sp repair, HTN HLD CHF with biV ICD scalp SCC  p/w weakness and dysphagia sp PEG  Asp pna stable and improved  Doubt worsening pna likely CHF with atelectasis  TIBURCIO on CKD  +troponins      Plan :   Finish course of Zosyn day  6/7  Asp precautions  Diurese per cardiology  Asp precautions      Continue with present regime .  Approptiate use of antibiotics and adverse effects reviewed.      I have discussed the above plan of care with patient/family in detail. They expressed understanding of the treatment plan . Risks, benefits and alternatives discussed in detail. I have asked if they have any questions or concerns and appropriately addressed them to the best of my ability .      > 45 minutes spent in direct patient care reviewing  the notes, lab data/ imaging , discussion with multidisciplinary team. All questions were addressed and answered to the best of my capacity .    Thank you for allowing me to participate in the care of your patient .      Katie Mason MD  Infectious Disease  [phone] HPI:  85 years old with history of PAF on coumadin (5mg x5 days and 2.5mg x2 days/week), CAD s/p   PCI Luu's esophagus, Zenker's diverticulum sp repair, HTN HLD CHF with biV ICD scalp SCC  p/w weakness and PRADO. Denies fever chills n/v/d. Also with c/o dysphagia. Pt is sp PEG 9/13/18.  Has been on Zosyn since 9/13/18 for poss asp pna. Pt complained of Left upper abd pressure  CXR showed worsening infiltrates and CT A/P showed effusion and atelectasis.    Infectious Disease consult was called to evaluate pt and for antibiotic management.    Past Medical & Surgical Hx:  PAST MEDICAL & SURGICAL HISTORY:  AF (atrial fibrillation): Cardioversion 5/2012  GERD (gastroesophageal reflux disease)  CHF (congestive heart failure)  CAD (coronary artery disease)  Glaucoma  HTN (Hypertension)  S/P ICD (internal cardiac defibrillator) procedure  H/O excision of Zenker's diverticulum  S/P Cataract Surgery - bilateral  s/p Angioplasty with Stent - 2010  History of Rotator Cuff Surgery Right and Left  S/P Cholecystectomy  S/P TURP (Transurethral Resection of Prostate)  S/P Prostatectomy - 2005      Social History--  EtOH: denies   Tobacco: denies   Drug Use: denies       FAMILY HISTORY:  No pertinent family history in first degree relatives    Allergies  No Known Allergies    Home Medications:  atorvastatin 40 mg oral tablet: 1 tab(s) orally once a day (at bedtime) (10 Sep 2018 20:24)  clopidogrel 75 mg oral tablet: 1 tab(s) orally once a day (10 Sep 2018 20:24)  digoxin 125 mcg (0.125 mg) oral tablet: 1 tab(s) orally once a day (10 Sep 2018 20:24)  Lasix 40 mg oral tablet: 1 tab(s) orally once a day (10 Sep 2018 20:24)  metoprolol succinate 25 mg oral tablet, extended release: orally 2 times a day (10 Sep 2018 20:24)  spironolactone 25 mg oral tablet: orally once a day (10 Sep 2018 20:24)  timolol hemihydrate 0.5% ophthalmic solution: 1 drop(s) to each affected eye 2 times a day (10 Sep 2018 20:24)  warfarin 2.5 mg oral tablet: 1 tab(s) orally 2 times a week (10 Sep 2018 20:24)  warfarin 5 mg oral tablet: orally 5 times a week (10 Sep 2018 20:24)      Current Inpatient Medications :    ANTIBIOTICS:   piperacillin/tazobactam IVPB. 3.375 Gram(s) IV Intermittent every 12 hours      OTHER RELEVANT MEDICATIONS :  acetaminophen    Suspension .. 650 milliGRAM(s) Enteral Tube every 6 hours PRN  ALBUTerol    0.083% 2.5 milliGRAM(s) Nebulizer every 6 hours  atorvastatin 40 milliGRAM(s) Oral at bedtime  clopidogrel Tablet 75 milliGRAM(s) Oral daily  digoxin     Tablet 0.125 milliGRAM(s) Oral daily  guaiFENesin    Syrup 200 milliGRAM(s) Oral every 6 hours PRN  influenza   Vaccine 0.5 milliLiter(s) IntraMuscular once  metoprolol tartrate 25 milliGRAM(s) Oral two times a day  midodrine 2.5 milliGRAM(s) Oral <User Schedule>  pantoprazole  Injectable 40 milliGRAM(s) IV Push daily  timolol 0.5% Solution 1 Drop(s) Both EYES two times a day  warfarin 5 milliGRAM(s) Oral once    ROS:  CONSTITUTIONAL:  Negative fever or chills  EYES:  Negative  blurry vision or double vision  CARDIOVASCULAR:  Negative for chest pain or palpitations  RESPIRATORY:  Negative for cough, wheezing, or SOB   GASTROINTESTINAL:  Negative for nausea, vomiting, diarrhea, constipation, or abdominal pain  GENITOURINARY:  Negative frequency, urgency , dysuria or hematuria   NEUROLOGIC:  No headache, confusion, dizziness, lightheadedness  All other systems were reviewed and are negative      I&O's Detail    18 Sep 2018 07:01  -  19 Sep 2018 07:00  --------------------------------------------------------  IN:    Enteral Tube Flush: 425 mL    Free Water: 100 mL    ns in tub fed  [id number]: 1192 mL    Solution: 200 mL  Total IN: 1917 mL    OUT:    Indwelling Catheter - Urethral: 925 mL  Total OUT: 925 mL    Total NET: 992 mL      19 Sep 2018 07:01  -  19 Sep 2018 17:35  --------------------------------------------------------  IN:    Enteral Tube Flush: 250 mL    ns in tub fed  [id number]: 700 mL    Solution: 100 mL  Total IN: 1050 mL    OUT:    Voided: 400 mL  Total OUT: 400 mL    Total NET: 650 mL    Physical Exam:  Vital Signs Last 24 Hrs  T(C): 36.4 (19 Sep 2018 16:05), Max: 36.9 (19 Sep 2018 05:09)  T(F): 97.6 (19 Sep 2018 16:05), Max: 98.5 (19 Sep 2018 05:09)  HR: 69 (19 Sep 2018 16:05) (68 - 76)  BP: 94/54 (19 Sep 2018 16:05) (84/54 - 104/66)  BP(mean): --  RR: 19 (19 Sep 2018 16:05) (17 - 19)  SpO2: 98% (19 Sep 2018 16:05) (96% - 99%)    General: in no acute distress frontal scalp with squamous cell ca noted  Eyes: sclera anicteric, pupils equal and reactive to light  ENMT: buccal mucosa moist, pharynx not injected  Neck: supple, trachea midline  Lungs: Decreased , no wheeze/rhonchi  Cardiovascular: regular rate and rhythm, S1 S2  Abdomen: soft, nontender, +PEG, bowel sounds normal  Neurological:  alert and oriented x3, Cranial Nerves II-XII grossly intact  Skin:no increased ecchymosis/petechiae/purpura  Lymph Nodes: no palpable cervical/supraclavicular lymph nodes enlargements  Extremities: no cyanosis/clubbing/edema    Labs:                         10.6   13.35 )-----------( 170      ( 19 Sep 2018 06:28 )             31.2   09-19    140  |  105  |  81<H>  ----------------------------<  90  4.6   |  27  |  1.70<H>    Ca    8.4<L>      19 Sep 2018 06:28    RECENT CULTURES:  Culture - Blood (05.19.15 @ 09:52)    Specimen Source: .Blood Blood    Culture Results:   No growth at 5 days.    Culture - Blood (05.19.15 @ 09:51)    Specimen Source: .Blood Blood    Culture Results:   No growth at 5 days.    RADIOLOGY & ADDITIONAL STUDIES:    EXAM:  CT ABDOMEN AND PELVIS                            PROCEDURE DATE:  09/19/2018          INTERPRETATION:  .    CLINICAL INFORMATION: Left upper quadrant pain and tenderness.    TECHNIQUE: Helical axial images were obtained from the domes of the  diaphragm through the pubic symphysis without the administration of IV or   oral contrast. Sagittal and coronal reformatted images were obtained from   the source data.    COMPARISON: No prior CT examinations of the abdomen and pelvis are   available for comparison.     FINDINGS: Evaluation of the heart, vascular structures, and   intra-abdominal organs is limited without the administration of IV   contrast. The heart size is enlarged. A small pericardial effusion is   notable. There are atherosclerotic calcifications of the imaged coronary   arteries, aorta, and branch vessels. The imaged portions of the aorta are   normal in caliber.    Moderate-sized bilateral pleural effusions are notable with adjacent   atelectasis. Tiny calcified granulomas are noted within the left lower   lobe.    The unenhanced appearance to the liver, spleen, pancreas, and adrenal   glands appear unremarkable. The patient is status post cholecystectomy.    An exophytic left-sided renal cyst is noted. There is nonspecific   bilateral perinephric stranding. There is no hydroureteronephrosis. The   patient is status post Dubois catheter placement with the Dubois balloon   inside a decompressed urinary bladder. Intraluminal urinary bladder air   is compatible with Dubois catheter placement.    There is no abdominal or pelvic lymphadenopathy.    The patient is status post percutaneous gastrostomy tube placement with   the gastrostomy balloon inside the lumen of the stomach. There is no   bowel obstruction. High density contrast material is notable within the   large bowel loops from a prior contrast study. Colonic diverticulosis is   noted. The appendix is normal.    The prostate gland and seminal vesicles appear unremarkable.     A left-sided total hip arthroplasty is noted. There is diffuse   osteopenia. Multilevel degenerative changes are noted within the imaged   potions of the spine.    Trace anasarca is notable. There is symmetric bilateral gynecomastia.    IMPRESSION: No bowel obstruction or bowel inflammation.    Small pericardial effusion.    Other chronic nonemergent findings, as discussed in the body of the   report.      EXAM:  XR CHEST PORTABLE ROUTINE 1V                            PROCEDURE DATE:  09/19/2018        INTERPRETATION:  Coughing.    AP chest. Prior 9/10/2018.    There is significant worsening in bibasilar airspace disease. Correlate   for pneumonia and/or pulmonary edema. Small right pleural effusion   similar to prior. Moderate left pleural effusion slightly increased.   Otherwise no change.    Impression: As above    Assessment :   85 years old with history of PAF on coumadin (5mg x5 days and 2.5mg x2 days/week), CAD s/p   PCI Luu's esophagus, Zenker's diverticulum sp repair, HTN HLD CHF with biV ICD scalp SCC  p/w weakness and dysphagia sp PEG  Asp pna stable and improved  Doubt worsening pna likely CHF with atelectasis  TIBURCIO on CKD  +troponins  Leukocytosis multifactorial    Plan :   Finish course of Zosyn day  6/7  Trend wbc  Asp precautions  Diurese per cardiology      Continue with present regiment .  Approptiate use of antibiotics and adverse effects reviewed.      I have discussed the above plan of care with patient/family in detail. They expressed understanding of the treatment plan . Risks, benefits and alternatives discussed in detail. I have asked if they have any questions or concerns and appropriately addressed them to the best of my ability .      > 45 minutes spent in direct patient care reviewing  the notes, lab data/ imaging , discussion with multidisciplinary team. All questions were addressed and answered to the best of my capacity .    Thank you for allowing me to participate in the care of your patient .      Katie Mason MD  Infectious Disease  [phone]

## 2018-09-19 NOTE — PROGRESS NOTE ADULT - PROBLEM SELECTOR PLAN 6
-HTN, chronic. has hypotension now   -c/w midodrine for hypotension  -c/w Metoprolol succinate 25mg BID. with hold parameter

## 2018-09-19 NOTE — CONSULT NOTE ADULT - REASON FOR ADMISSION
sob, dyspnea, difficulty walking, dysphagia

## 2018-09-19 NOTE — PROGRESS NOTE ADULT - PROBLEM SELECTOR PLAN 3
-PAF on home coumadin (5mg x5 days/week, 2.5mg x2 days/week)  -Continue digoxin 0.125mcg  -Coumadin 5mg today as INR was 1.91  -cardio recs appreciated

## 2018-09-19 NOTE — PROGRESS NOTE ADULT - ATTENDING COMMENTS
Advance care planning d/w pt in detail. He is aware of his poor condition and multiple medical problems.   Pt is DNR/DNI and molst form in chart  C/W current treatment  Palliative care. repeat s/s eval

## 2018-09-19 NOTE — PROGRESS NOTE ADULT - PROBLEM SELECTOR PLAN 1
pulm congestion  s/p PEG  oral and skin care  ABX as per ID  NEBS to help mobilize secretions  chest pt  suction PRN  keep sat > 88 pct  tolerating room air  increase activity as tolerated  overall prognosis guarded  remains high risk for aspiration  will follow and monitor

## 2018-09-19 NOTE — PROGRESS NOTE ADULT - SUBJECTIVE AND OBJECTIVE BOX
Interval Events: Pt seen and examined. He denies abdominal pain, N/V/D/C, brbpr/melena. Pt is having bms. Tolerating PEG feeds well.     MEDICATIONS  (STANDING):  ALBUTerol    0.083% 2.5 milliGRAM(s) Nebulizer every 6 hours  atorvastatin 40 milliGRAM(s) Oral at bedtime  clopidogrel Tablet 75 milliGRAM(s) Oral daily  digoxin     Tablet 0.125 milliGRAM(s) Oral daily  influenza   Vaccine 0.5 milliLiter(s) IntraMuscular once  metoprolol tartrate 25 milliGRAM(s) Oral two times a day  midodrine 2.5 milliGRAM(s) Oral <User Schedule>  pantoprazole  Injectable 40 milliGRAM(s) IV Push daily  piperacillin/tazobactam IVPB. 3.375 Gram(s) IV Intermittent every 12 hours  timolol 0.5% Solution 1 Drop(s) Both EYES two times a day  warfarin 5 milliGRAM(s) Oral once    MEDICATIONS  (PRN):  acetaminophen    Suspension .. 650 milliGRAM(s) Enteral Tube every 6 hours PRN Mild Pain (1 - 3)  guaiFENesin    Syrup 200 milliGRAM(s) Oral every 6 hours PRN Cough      Allergies    No Known Allergies    Intolerances        Review of Systems:    General:  No wt loss, fevers, chills, night sweats,fatigue,   Eyes:  Good vision, no reported pain  ENT:  No sore throat, pain, runny nose, dysphagia  CV:  No pain, palpitations, hypo/hypertension  Resp:  No dyspnea, cough, tachypnea, wheezing  GI:  No pain, No nausea, No vomiting, No diarrhea, No constipation, No weight loss, No fever, No pruritis, No rectal bleeding, No melena, No dysphagia  :  No pain, bleeding, incontinence, nocturia  Muscle:  No pain, weakness  Neuro:  No weakness, tingling, memory problems  Psych:  No fatigue, insomnia, mood problems, depression  Endocrine:  No polyuria, polydypsia, cold/heat intolerance  Heme:  No petechiae, ecchymosis, easy bruisability  Skin:  No rash, tattoos, scars, edema      Vital Signs Last 24 Hrs  T(C): 36.4 (19 Sep 2018 08:05), Max: 36.9 (19 Sep 2018 05:09)  T(F): 97.6 (19 Sep 2018 08:05), Max: 98.5 (19 Sep 2018 05:09)  HR: 70 (19 Sep 2018 08:05) (68 - 76)  BP: 94/60 (19 Sep 2018 08:05) (93/55 - 109/65)  BP(mean): --  RR: 17 (19 Sep 2018 08:05) (17 - 18)  SpO2: 98% (19 Sep 2018 08:05) (96% - 100%)    PHYSICAL EXAM:    Constitutional: NAD, well-developed  HEENT: EOMI, throat clear  Neck: No LAD, supple  Respiratory: CTA and P  Cardiovascular: S1 and S2, RRR, no M  Gastrointestinal: BS+, soft, NT/ND, neg HSM, PEG c/d/i  Extremities: No peripheral edema, neg clubbing, cyanosis  Vascular: 2+ peripheral pulses  Neurological: A/O x 3, no focal deficits  Psychiatric: Normal mood, normal affect  Skin: No rashes          LABS:                        10.6   13.35 )-----------( 170      ( 19 Sep 2018 06:28 )             31.2     09-19    140  |  105  |  81<H>  ----------------------------<  90  4.6   |  27  |  1.70<H>    Ca    8.4<L>      19 Sep 2018 06:28      PT/INR - ( 19 Sep 2018 06:28 )   PT: 21.1 sec;   INR: 1.91 ratio               RADIOLOGY & ADDITIONAL TESTS:

## 2018-09-19 NOTE — PROGRESS NOTE ADULT - SUBJECTIVE AND OBJECTIVE BOX
KATE ZAFAR  85y  Male    Patient is a 85y old  Male who presents with a chief complaint of sob, dyspnea, difficulty walking, dysphagia (19 Sep 2018 11:11)      awake and alert  denies any chest pain or shortness of breath.    PAST MEDICAL & SURGICAL HISTORY:  AF (atrial fibrillation): Cardioversion 5/2012  GERD (gastroesophageal reflux disease)  CHF (congestive heart failure)  CAD (coronary artery disease)  Glaucoma  HTN (Hypertension)  S/P ICD (internal cardiac defibrillator) procedure  H/O excision of Zenker's diverticulum  S/P Cataract Surgery - bilateral  s/p Angioplasty with Stent - 2010  History of Rotator Cuff Surgery Right and Left  S/P Cholecystectomy  S/P TURP (Transurethral Resection of Prostate)  S/P Prostatectomy - 2005    PHYSICAL EXAM:    T(C): 36.4 (09-19-18 @ 08:05), Max: 36.9 (09-19-18 @ 05:09)  HR: 70 (09-19-18 @ 08:05) (68 - 76)  BP: 94/60 (09-19-18 @ 08:05) (93/55 - 109/65)  RR: 17 (09-19-18 @ 08:05) (17 - 18)  SpO2: 98% (09-19-18 @ 08:05) (96% - 99%)  Wt(kg): --    I&O's Detail    18 Sep 2018 07:01  -  19 Sep 2018 07:00  --------------------------------------------------------  IN:    Enteral Tube Flush: 425 mL    Free Water: 100 mL    ns in tub fed  [id number]: 1192 mL    Solution: 200 mL  Total IN: 1917 mL    OUT:    Indwelling Catheter - Urethral: 925 mL  Total OUT: 925 mL    Total NET: 992 mL      19 Sep 2018 07:01  -  19 Sep 2018 11:49  --------------------------------------------------------  IN:    Enteral Tube Flush: 100 mL    ns in tub fed  [id number]: 280 mL  Total IN: 380 mL    OUT:  Total OUT: 0 mL    Total NET: 380 mL    Respiratory: clear anteriorly, decreased BS at bases  Cardiovascular: S1 S2  Gastrointestinal: soft NT ND +BS  Extremities: edema   Neuro: Awake and alert    MEDICATIONS  (STANDING):  ALBUTerol    0.083% 2.5 milliGRAM(s) Nebulizer every 6 hours  atorvastatin 40 milliGRAM(s) Oral at bedtime  clopidogrel Tablet 75 milliGRAM(s) Oral daily  digoxin     Tablet 0.125 milliGRAM(s) Oral daily  influenza   Vaccine 0.5 milliLiter(s) IntraMuscular once  metoprolol tartrate 25 milliGRAM(s) Oral two times a day  midodrine 2.5 milliGRAM(s) Oral <User Schedule>  pantoprazole  Injectable 40 milliGRAM(s) IV Push daily  piperacillin/tazobactam IVPB. 3.375 Gram(s) IV Intermittent every 12 hours  timolol 0.5% Solution 1 Drop(s) Both EYES two times a day  warfarin 5 milliGRAM(s) Oral once    MEDICATIONS  (PRN):  acetaminophen    Suspension .. 650 milliGRAM(s) Enteral Tube every 6 hours PRN Mild Pain (1 - 3)  guaiFENesin    Syrup 200 milliGRAM(s) Oral every 6 hours PRN Cough                            10.6   13.35 )-----------( 170      ( 19 Sep 2018 06:28 )             31.2       09-19    140  |  105  |  81<H>  ----------------------------<  90  4.6   |  27  |  1.70<H>    Ca    8.4<L>      19 Sep 2018 06:28      < from: CT Abdomen and Pelvis No Cont (09.19.18 @ 07:39) >  EXAM:  CT ABDOMEN AND PELVIS                            PROCEDURE DATE:  09/19/2018          INTERPRETATION:  .    CLINICAL INFORMATION: Left upper quadrant pain and tenderness.    TECHNIQUE: Helical axial images were obtained from the domes of the  diaphragm through the pubic symphysis without the administration of IV or   oral contrast. Sagittal and coronal reformatted images were obtained from   the source data.    COMPARISON: No prior CT examinations of the abdomen and pelvis are   available for comparison.     FINDINGS: Evaluation of the heart, vascular structures, and   intra-abdominal organs is limited without the administration of IV   contrast. The heart size is enlarged. A small pericardial effusion is   notable. There are atherosclerotic calcifications of the imaged coronary   arteries, aorta, and branch vessels. The imaged portions of the aorta are   normal in caliber.    Moderate-sized bilateral pleural effusions are notable with adjacent   atelectasis. Tiny calcified granulomas are noted within the left lower   lobe.    The unenhanced appearance to the liver, spleen, pancreas, and adrenal   glands appear unremarkable. The patient is status post cholecystectomy.    An exophytic left-sided renal cyst is noted. There is nonspecific   bilateral perinephric stranding. There is no hydroureteronephrosis. The   patient is status post Dubois catheter placement with the Dubois balloon   inside a decompressed urinary bladder. Intraluminal urinary bladder air   is compatible with Dubois catheter placement.    There is no abdominal or pelvic lymphadenopathy.    The patient is status post percutaneous gastrostomy tube placement with   the gastrostomy balloon inside the lumen of the stomach. There is no   bowel obstruction. High density contrast material is notable within the   large bowel loops from a prior contrast study. Colonic diverticulosis is   noted. The appendix is normal.    The prostate gland and seminal vesicles appear unremarkable.     A left-sided total hip arthroplasty is noted. There is diffuse   osteopenia. Multilevel degenerative changes are noted within the imaged   potions of the spine.    Trace anasarca is notable. There is symmetric bilateral gynecomastia.    IMPRESSION: No bowel obstruction or bowel inflammation.    Small pericardial effusion.

## 2018-09-19 NOTE — PROGRESS NOTE ADULT - ASSESSMENT
84 M of PAF AC, CAD s/p remote PCI Luu's esophagus, a repaired Zenker's diverticulum, hypertension, reflux, hypercholesterolemia, glaucoma, moderate NICM, HCM s/p biV ICD p/w dyspnea.  CT scan not consistent with severe volume overload; small pericardial and pleural effusions are noted.  Likely some component of aspiration  Ned are elevated but likely from demand ischemia;  BP cont to be soft ( SBP 's)     # 1 Dyspnea  - No evidence of vascular congestion on CT chest.  However, result is evident of Pna  - Patient remains dyspneic on exertion with copious mucus production  - Continue Abx per Primary/Pulm  - continue bronchodilators  - Recommend incentive spirometer  - Chest PT    #2 CAD  - stable CAD  - Continue Plavix, BB, and statin    #3 PAF  - Continue BB and digoxin for rate control  - 9/19 INR 1.9; Dose Coumadin to keep INR 2-3  - Monitor closely as he is at risk for acute blood loss anemia    #4 TIBURCIO  - Creatinine stablizing and improving  - Avoid nephrotoxics  - Renal following    #5 Anemia  - H/H has been dropping, but improved.  - Workup per primary    Further cardiac workup as case unfolds  Will continue to follow 84 M of PAF AC, CAD s/p remote PCI Luu's esophagus, a repaired Zenker's diverticulum, hypertension, reflux, hypercholesterolemia, glaucoma, moderate NICM, HCM s/p biV ICD p/w dyspnea.  CT scan not consistent with severe volume overload; small pericardial and pleural effusions are noted.  Likely some component of aspiration  Ned are elevated but likely from demand ischemia;  BP cont to be soft ( SBP 's)     # 1 Dyspnea  - No evidence of vascular congestion on CT chest.  However, result is evident of Pna  - Likely from asp  - Continue Abx per Primary/Pulm  - continue bronchodilators  - Recommend incentive spirometer  - Chest PT    #2 CAD  - stable CAD  - Continue Plavix, BB, and statin    #3 PAF  - Continue BB and digoxin for rate control  - 9/19 INR 1.9; Dose Coumadin to keep INR 2-3  - Monitor closely as he is at risk for acute blood loss anemia    #4 TIBURCIO  - Creatinine stablizing and improving  - Avoid nephrotoxics  - Renal following    #5 Anemia  - H/H has been dropping, but improved.  - Workup per primary    Further cardiac workup as case unfolds  Will continue to follow

## 2018-09-19 NOTE — PROGRESS NOTE ADULT - ASSESSMENT
85 years old with history of HTN, CAD, CHF, PPM, AF now admitted with wt loss, difficulty swallowing.   now abdominal pain on LUQ. CT scan of the abdomen and pelvis was negative.    Now he is S/P PEG and is on tube feeds. He developed TIBURCIO, which is most likely due to pre renal azotemia and/or Urinary retention. Renal sonogram is normal. spoke to family at bed side in detail.

## 2018-09-19 NOTE — PROGRESS NOTE ADULT - SUBJECTIVE AND OBJECTIVE BOX
Date/Time Patient Seen:  		  Referring MD:   Data Reviewed	       Patient is a 85y old  Male who presents with a chief complaint of sob, dyspnea, difficulty walking, dysphagia (18 Sep 2018 15:44)  in bed  seen and examined  vs and meds reviewed      Subjective/HPI     PAST MEDICAL & SURGICAL HISTORY:  AF (atrial fibrillation): Cardioversion 5/2012  GERD (gastroesophageal reflux disease)  CHF (congestive heart failure)  CAD (coronary artery disease)  Glaucoma  HTN (Hypertension)  S/P ICD (internal cardiac defibrillator) procedure  H/O excision of Zenker's diverticulum  S/P Cataract Surgery - bilateral  s/p Angioplasty with Stent - 2010  History of Rotator Cuff Surgery Right and Left  S/P Cholecystectomy  S/P TURP (Transurethral Resection of Prostate)  S/P Prostatectomy - 2005  S/P Prostatectomy  S/P Prostatectomy        Medication list         MEDICATIONS  (STANDING):  ALBUTerol    0.083% 2.5 milliGRAM(s) Nebulizer every 6 hours  atorvastatin 40 milliGRAM(s) Oral at bedtime  clopidogrel Tablet 75 milliGRAM(s) Oral daily  digoxin     Tablet 0.125 milliGRAM(s) Oral daily  influenza   Vaccine 0.5 milliLiter(s) IntraMuscular once  metoprolol tartrate 25 milliGRAM(s) Oral two times a day  midodrine 2.5 milliGRAM(s) Oral <User Schedule>  pantoprazole  Injectable 40 milliGRAM(s) IV Push daily  piperacillin/tazobactam IVPB. 3.375 Gram(s) IV Intermittent every 12 hours  timolol 0.5% Solution 1 Drop(s) Both EYES two times a day    MEDICATIONS  (PRN):  acetaminophen    Suspension .. 650 milliGRAM(s) Enteral Tube every 6 hours PRN Mild Pain (1 - 3)  guaiFENesin    Syrup 200 milliGRAM(s) Oral every 6 hours PRN Cough         Vitals log        ICU Vital Signs Last 24 Hrs  T(C): 36.9 (19 Sep 2018 05:09), Max: 36.9 (19 Sep 2018 05:09)  T(F): 98.5 (19 Sep 2018 05:09), Max: 98.5 (19 Sep 2018 05:09)  HR: 76 (19 Sep 2018 05:09) (68 - 76)  BP: 104/66 (19 Sep 2018 05:09) (93/55 - 109/65)  BP(mean): --  ABP: --  ABP(mean): --  RR: 17 (19 Sep 2018 05:09) (17 - 18)  SpO2: 96% (19 Sep 2018 05:09) (95% - 100%)           Input and Output:  I&O's Detail    17 Sep 2018 07:01  -  18 Sep 2018 07:00  --------------------------------------------------------  IN:    Enteral Tube Flush: 625 mL    Free Water: 120 mL    ns in tub fed  [id number]: 1750 mL    Solution: 200 mL  Total IN: 2695 mL    OUT:    Indwelling Catheter - Urethral: 700 mL  Total OUT: 700 mL    Total NET: 1995 mL      18 Sep 2018 07:01  -  19 Sep 2018 06:17  --------------------------------------------------------  IN:    Enteral Tube Flush: 425 mL    Free Water: 100 mL    ns in tub fed  [id number]: 1192 mL    Solution: 200 mL  Total IN: 1917 mL    OUT:    Indwelling Catheter - Urethral: 925 mL  Total OUT: 925 mL    Total NET: 992 mL          Lab Data                        10.4   15.13 )-----------( 158      ( 18 Sep 2018 06:44 )             30.6     09-18    138  |  103  |  85<H>  ----------------------------<  121<H>  4.4   |  26  |  2.00<H>    Ca    8.2<L>      18 Sep 2018 06:44              Review of Systems	      Objective     Physical Examination    heart s1s2  lung dec BS  abd soft      Pertinent Lab findings & Imaging      Sherly:  NO   Adequate UO     I&O's Detail    17 Sep 2018 07:01  -  18 Sep 2018 07:00  --------------------------------------------------------  IN:    Enteral Tube Flush: 625 mL    Free Water: 120 mL    ns in tub fed  [id number]: 1750 mL    Solution: 200 mL  Total IN: 2695 mL    OUT:    Indwelling Catheter - Urethral: 700 mL  Total OUT: 700 mL    Total NET: 1995 mL      18 Sep 2018 07:01  -  19 Sep 2018 06:17  --------------------------------------------------------  IN:    Enteral Tube Flush: 425 mL    Free Water: 100 mL    ns in tub fed  [id number]: 1192 mL    Solution: 200 mL  Total IN: 1917 mL    OUT:    Indwelling Catheter - Urethral: 925 mL  Total OUT: 925 mL    Total NET: 992 mL               Discussed with:     Cultures:	        Radiology

## 2018-09-19 NOTE — PROGRESS NOTE ADULT - PROBLEM SELECTOR PLAN 1
s/p EGD with PEG placement 9/14  PEG care  tube feeds as tolerated  monitor residuals   hob > 30 degrees   hx of zenkers, sp remote repair  ct chest reviewed  ivf as needed  aspiration precautions, elevate hob  ppi iv qd  nutrition recs appreciated  will follow  CT a/p reviewed with no acute findings

## 2018-09-19 NOTE — PROGRESS NOTE ADULT - SUBJECTIVE AND OBJECTIVE BOX
Resident Progress Note    Patient is a 85y old  Male who presents with a chief complaint of sob, dyspnea, difficulty walking, dysphagia (19 Sep 2018 11:07)    HPI: Patient seen and examined at bedside. No acute events overnight. Patient continues to endorse LUQ pain and states that he feels weak.  No other acute complaints.    ROS:  CONSTITUTIONAL: admits to generalized weakness, no fevers or chills  EYES/ENT: No visual changes, no throat pain   RESPIRATORY: + cough, denies wheezing, hemoptysis, shortness of breath  CARDIOVASCULAR: No chest pain or palpitations  GASTROINTESTINAL: admits to abdominal pain over LUQ ribs 8-10, denies nausea, vomiting, or hematemesis; No diarrhea or constipation. No melena or hematochezia.  GENITOURINARY: No dysuria, frequency or hematuria  NEUROLOGICAL: No dizziness, numbness, or weakness  All other review of systems is negative unless indicated above.    Vital Signs Last 24 Hrs  T(C): 36.4 (09-19-18 @ 08:05), Max: 36.9 (09-19-18 @ 05:09)  T(F): 97.6 (09-19-18 @ 08:05), Max: 98.5 (09-19-18 @ 05:09)  HR: 70 (09-19-18 @ 08:05) (68 - 76)  BP: 94/60 (09-19-18 @ 08:05) (93/55 - 109/65)  RR: 17 (09-19-18 @ 08:05) (17 - 18)  SpO2: 98% (09-19-18 @ 08:05) (96% - 100%)    Physical Exam:  HEENT: NC/AT, EOMI, neck supple, MMM, large wound with eschar on scalp  RESPIRATORY: diminished breath sounds at the bases b/l  CARDIOVASCULAR: RRR, no murmurs, gallops, rubs  ABDOMINAL: soft, non-tender, non-distended, positive bowel sounds, PEG+ with dressing, c/d/i  EXTREMITIES: no clubbing, cyanosis, or edema    LABS:                        10.6   13.35 )-----------( 170      ( 19 Sep 2018 06:28 )             31.2     19 Sep 2018 06:28    140    |  105    |  81     ----------------------------<  90     4.6     |  27     |  1.70     Ca    8.4        19 Sep 2018 06:28      PT/INR - ( 19 Sep 2018 06:28 )   PT: 21.1 sec;   INR: 1.91 ratio      MEDICATIONS  (STANDING):  ALBUTerol    0.083% 2.5 milliGRAM(s) Nebulizer every 6 hours  atorvastatin 40 milliGRAM(s) Oral at bedtime  clopidogrel Tablet 75 milliGRAM(s) Oral daily  digoxin     Tablet 0.125 milliGRAM(s) Oral daily  influenza   Vaccine 0.5 milliLiter(s) IntraMuscular once  metoprolol tartrate 25 milliGRAM(s) Oral two times a day  midodrine 2.5 milliGRAM(s) Oral <User Schedule>  pantoprazole  Injectable 40 milliGRAM(s) IV Push daily  piperacillin/tazobactam IVPB. 3.375 Gram(s) IV Intermittent every 12 hours  timolol 0.5% Solution 1 Drop(s) Both EYES two times a day  warfarin 5 milliGRAM(s) Oral once    MEDICATIONS  (PRN):  acetaminophen    Suspension .. 650 milliGRAM(s) Enteral Tube every 6 hours PRN Mild Pain (1 - 3)  guaiFENesin    Syrup 200 milliGRAM(s) Oral every 6 hours PRN Cough    Allergies  No Known Allergies

## 2018-09-20 VITALS
RESPIRATION RATE: 18 BRPM | OXYGEN SATURATION: 96 % | DIASTOLIC BLOOD PRESSURE: 56 MMHG | SYSTOLIC BLOOD PRESSURE: 101 MMHG | HEART RATE: 70 BPM | TEMPERATURE: 98 F

## 2018-09-20 DIAGNOSIS — Z71.89 OTHER SPECIFIED COUNSELING: ICD-10-CM

## 2018-09-20 LAB
ANION GAP SERPL CALC-SCNC: 9 MMOL/L — SIGNIFICANT CHANGE UP (ref 5–17)
BUN SERPL-MCNC: 86 MG/DL — HIGH (ref 7–23)
CALCIUM SERPL-MCNC: 8.2 MG/DL — LOW (ref 8.5–10.1)
CHLORIDE SERPL-SCNC: 102 MMOL/L — SIGNIFICANT CHANGE UP (ref 96–108)
CO2 SERPL-SCNC: 28 MMOL/L — SIGNIFICANT CHANGE UP (ref 22–31)
CREAT SERPL-MCNC: 1.8 MG/DL — HIGH (ref 0.5–1.3)
GLUCOSE SERPL-MCNC: 116 MG/DL — HIGH (ref 70–99)
HCT VFR BLD CALC: 31 % — LOW (ref 39–50)
HGB BLD-MCNC: 10.5 G/DL — LOW (ref 13–17)
INR BLD: 2.14 RATIO — HIGH (ref 0.88–1.16)
MCHC RBC-ENTMCNC: 32.4 PG — SIGNIFICANT CHANGE UP (ref 27–34)
MCHC RBC-ENTMCNC: 33.9 GM/DL — SIGNIFICANT CHANGE UP (ref 32–36)
MCV RBC AUTO: 95.7 FL — SIGNIFICANT CHANGE UP (ref 80–100)
NRBC # BLD: 0 /100 WBCS — SIGNIFICANT CHANGE UP (ref 0–0)
PLATELET # BLD AUTO: 189 K/UL — SIGNIFICANT CHANGE UP (ref 150–400)
POTASSIUM SERPL-MCNC: 4.4 MMOL/L — SIGNIFICANT CHANGE UP (ref 3.5–5.3)
POTASSIUM SERPL-SCNC: 4.4 MMOL/L — SIGNIFICANT CHANGE UP (ref 3.5–5.3)
PROTHROM AB SERPL-ACNC: 23.7 SEC — HIGH (ref 9.8–12.7)
RBC # BLD: 3.24 M/UL — LOW (ref 4.2–5.8)
RBC # FLD: 15.2 % — HIGH (ref 10.3–14.5)
SODIUM SERPL-SCNC: 139 MMOL/L — SIGNIFICANT CHANGE UP (ref 135–145)
WBC # BLD: 11.96 K/UL — HIGH (ref 3.8–10.5)
WBC # FLD AUTO: 11.96 K/UL — HIGH (ref 3.8–10.5)

## 2018-09-20 PROCEDURE — 99232 SBSQ HOSP IP/OBS MODERATE 35: CPT

## 2018-09-20 PROCEDURE — 99239 HOSP IP/OBS DSCHRG MGMT >30: CPT

## 2018-09-20 RX ORDER — FUROSEMIDE 40 MG
1 TABLET ORAL
Qty: 0 | Refills: 0 | COMMUNITY

## 2018-09-20 RX ORDER — METOPROLOL TARTRATE 50 MG
1 TABLET ORAL
Qty: 0 | Refills: 0 | COMMUNITY
Start: 2018-09-20

## 2018-09-20 RX ORDER — ACETAMINOPHEN 500 MG
20.31 TABLET ORAL
Qty: 0 | Refills: 0 | COMMUNITY
Start: 2018-09-20

## 2018-09-20 RX ORDER — WARFARIN SODIUM 2.5 MG/1
5 TABLET ORAL ONCE
Qty: 0 | Refills: 0 | Status: DISCONTINUED | OUTPATIENT
Start: 2018-09-20 | End: 2018-09-20

## 2018-09-20 RX ORDER — TIMOLOL 0.5 %
1 DROPS OPHTHALMIC (EYE)
Qty: 0 | Refills: 0 | COMMUNITY
Start: 2018-09-20

## 2018-09-20 RX ORDER — DIGOXIN 250 MCG
1 TABLET ORAL
Qty: 0 | Refills: 0 | COMMUNITY
Start: 2018-09-20

## 2018-09-20 RX ORDER — WARFARIN SODIUM 2.5 MG/1
1 TABLET ORAL
Qty: 0 | Refills: 0 | COMMUNITY
Start: 2018-09-20

## 2018-09-20 RX ORDER — SPIRONOLACTONE 25 MG/1
0 TABLET, FILM COATED ORAL
Qty: 0 | Refills: 0 | COMMUNITY

## 2018-09-20 RX ORDER — PANTOPRAZOLE SODIUM 20 MG/1
40 TABLET, DELAYED RELEASE ORAL
Qty: 0 | Refills: 0 | COMMUNITY
Start: 2018-09-20

## 2018-09-20 RX ORDER — MIDODRINE HYDROCHLORIDE 2.5 MG/1
2.5 TABLET ORAL
Qty: 0 | Refills: 0 | Status: DISCONTINUED | OUTPATIENT
Start: 2018-09-20 | End: 2018-09-20

## 2018-09-20 RX ORDER — TIMOLOL 0.5 %
1 DROPS OPHTHALMIC (EYE)
Qty: 0 | Refills: 0 | COMMUNITY

## 2018-09-20 RX ORDER — CLOPIDOGREL BISULFATE 75 MG/1
1 TABLET, FILM COATED ORAL
Qty: 0 | Refills: 0 | COMMUNITY
Start: 2018-09-20

## 2018-09-20 RX ORDER — PANTOPRAZOLE SODIUM 20 MG/1
40 TABLET, DELAYED RELEASE ORAL DAILY
Qty: 0 | Refills: 0 | Status: DISCONTINUED | OUTPATIENT
Start: 2018-09-20 | End: 2018-09-20

## 2018-09-20 RX ORDER — MIDODRINE HYDROCHLORIDE 2.5 MG/1
1 TABLET ORAL
Qty: 0 | Refills: 0 | COMMUNITY
Start: 2018-09-20

## 2018-09-20 RX ORDER — ALBUTEROL 90 UG/1
2.5 AEROSOL, METERED ORAL
Qty: 0 | Refills: 0 | COMMUNITY
Start: 2018-09-20

## 2018-09-20 RX ORDER — ATORVASTATIN CALCIUM 80 MG/1
1 TABLET, FILM COATED ORAL
Qty: 0 | Refills: 0 | COMMUNITY
Start: 2018-09-20

## 2018-09-20 RX ORDER — WARFARIN SODIUM 2.5 MG/1
0 TABLET ORAL
Qty: 0 | Refills: 0 | COMMUNITY

## 2018-09-20 RX ADMIN — MIDODRINE HYDROCHLORIDE 2.5 MILLIGRAM(S): 2.5 TABLET ORAL at 06:34

## 2018-09-20 RX ADMIN — INFLUENZA VIRUS VACCINE 0.5 MILLILITER(S): 15; 15; 15; 15 SUSPENSION INTRAMUSCULAR at 16:51

## 2018-09-20 RX ADMIN — MIDODRINE HYDROCHLORIDE 2.5 MILLIGRAM(S): 2.5 TABLET ORAL at 17:01

## 2018-09-20 RX ADMIN — Medication 1 DROP(S): at 06:34

## 2018-09-20 RX ADMIN — Medication 650 MILLIGRAM(S): at 08:58

## 2018-09-20 RX ADMIN — Medication 1 DROP(S): at 17:01

## 2018-09-20 RX ADMIN — ALBUTEROL 2.5 MILLIGRAM(S): 90 AEROSOL, METERED ORAL at 07:41

## 2018-09-20 RX ADMIN — Medication 650 MILLIGRAM(S): at 07:58

## 2018-09-20 RX ADMIN — PANTOPRAZOLE SODIUM 40 MILLIGRAM(S): 20 TABLET, DELAYED RELEASE ORAL at 12:35

## 2018-09-20 RX ADMIN — Medication 0.12 MILLIGRAM(S): at 06:34

## 2018-09-20 RX ADMIN — CLOPIDOGREL BISULFATE 75 MILLIGRAM(S): 75 TABLET, FILM COATED ORAL at 12:35

## 2018-09-20 RX ADMIN — ALBUTEROL 2.5 MILLIGRAM(S): 90 AEROSOL, METERED ORAL at 14:11

## 2018-09-20 NOTE — PROGRESS NOTE ADULT - PROBLEM SELECTOR PLAN 9
Glaucoma, chronic.   continue timolol eye drops
-Patient on home coumadin, restarted  -PT eval: recommend home with home PT
-Patient on home coumadin, restarted (held today for supratherapeutic INR)  -PT eval: recommend home with home PT
Glaucoma, chronic.   - continue timolol eye drops
Glaucoma, chronic.   - continue timolol eye drops  - medications confirmed by [facility] pharmacy
Glaucoma, chronic.   - continue timolol eye drops  - medications confirmed by [facility] pharmacy
Glaucoma, chronic.   - continue timolol eye drops  - medications confirmed by [facility] pharmacy
Patient on home coumadin, restarted

## 2018-09-20 NOTE — PROGRESS NOTE ADULT - PROBLEM SELECTOR PLAN 1
-FTT, likely 2/2 poor PO intake, dysphagia, has severe malnutrition  -S/P PEG placement and on tube feeding, tolerating well -FTT, likely 2/2 poor PO intake, dysphagia, has severe malnutrition  -S/P PEG placement and on tube feeding, tolerating well  -to have TOV today

## 2018-09-20 NOTE — PROGRESS NOTE ADULT - PROBLEM SELECTOR PLAN 1
on ABX for asp pna  PEG for dysphagia  oral and skin care  asp prec  HOB elev  NEBS  consider PRN Diuresis  assist with all ADL, fall prec, PT, out of bed as tolerated  poss [facility] dc  prognosis guarded  pt DNR DNI  more awake and alert  cont to have cough with sputum

## 2018-09-20 NOTE — PROGRESS NOTE ADULT - PROBLEM SELECTOR PROBLEM 8
Glaucoma
CHF (congestive heart failure)
Glaucoma
CHF (congestive heart failure)

## 2018-09-20 NOTE — PROGRESS NOTE ADULT - ATTENDING COMMENTS
Advance care planning d/w pt in detail. He is aware of his poor condition and multiple medical problems.   Pt is DNR/DNI and molst form in chart  C/W current treatment.  Pt had TOV this am and is unable to void.  Dubois put in back.  Stable for d/c to [facility]

## 2018-09-20 NOTE — PROGRESS NOTE ADULT - PROBLEM SELECTOR PLAN 8
Glaucoma, chronic.   continue timolol eye drops
-Glaucoma, chronic  -continue timolol eye drops
-Glaucoma, chronic.   -continue timolol eye drops
HFpEF (EF 40%). On home Lasix 40mg PO daily.  - Lasix 40mg IV daily  - strict I"s and O's
HFpEF (EF 40%). On home Lasix 40mg PO daily.  - Lasix 40mg IV daily  - strict I"s and O's
HFpEF (EF 40%). On home Lasix 40mg PO daily.  -Does not appear overloaded at this time. As he has been NPO x 2 days, will start gentle IV fluids today.   - strict I"s and O's
HFpEF (EF 40%). On home Lasix 40mg PO daily.  -Does not appear overloaded at this time. Continue gentle IV fluids today for TIBURCIO. Monitor volume status closely.    - strict Is and Os
HFpEF (EF 40%). On home Lasix 40mg PO daily.  Does not appear overloaded at this time. on hold now  Monitor volume status closely.    strict Is and Os

## 2018-09-20 NOTE — PROGRESS NOTE ADULT - PROBLEM SELECTOR PLAN 3
-PAF on home coumadin (5mg x5 days/week, 2.5mg x2 days/week)  -Continue digoxin 0.125mcg  -Coumadin 5mg today as INR was 2.14  -cardio recs appreciated

## 2018-09-20 NOTE — PROGRESS NOTE ADULT - SUBJECTIVE AND OBJECTIVE BOX
Patient is a 85y old  Male who presents with a chief complaint of sob, dyspnea, difficulty walking, dysphagia (17 Sep 2018 12:11)      Patient seen in follow up for TIBURCIO. On tube feeds. + beckie    PAST MEDICAL HISTORY:  AF (atrial fibrillation)  GERD (gastroesophageal reflux disease)  CHF (congestive heart failure)  CAD (coronary artery disease)  Glaucoma  HTN (Hypertension)        MEDICATIONS  (STANDING):  ALBUTerol    0.083% 2.5 milliGRAM(s) Nebulizer every 6 hours  atorvastatin 40 milliGRAM(s) Oral at bedtime  clopidogrel Tablet 75 milliGRAM(s) Oral daily  digoxin     Tablet 0.125 milliGRAM(s) Oral daily  influenza   Vaccine 0.5 milliLiter(s) IntraMuscular once  metoprolol tartrate 25 milliGRAM(s) Oral two times a day  midodrine 2.5 milliGRAM(s) Oral <User Schedule>  pantoprazole  Injectable 40 milliGRAM(s) IV Push daily  timolol 0.5% Solution 1 Drop(s) Both EYES two times a day  warfarin 5 milliGRAM(s) Oral once    MEDICATIONS  (PRN):  acetaminophen    Suspension .. 650 milliGRAM(s) Enteral Tube every 6 hours PRN Mild Pain (1 - 3)  guaiFENesin    Syrup 200 milliGRAM(s) Oral every 6 hours PRN Cough    T(C): 36.4 (09-20-18 @ 07:05), Max: 36.9 (09-19-18 @ 05:09)  HR: 70 (09-20-18 @ 10:41) (68 - 78)  BP: 97/59 (09-20-18 @ 07:20) (84/54 - 109/65)  RR: 18 (09-20-18 @ 07:20)  SpO2: 94% (09-20-18 @ 10:41)  Wt(kg): --  I&O's Detail    19 Sep 2018 07:01  -  20 Sep 2018 07:00  --------------------------------------------------------  IN:    Enteral Tube Flush: 250 mL    ns in tub fed  [id number]: 700 mL    Solution: 100 mL  Total IN: 1050 mL    OUT:    Indwelling Catheter - Urethral: 525 mL    Voided: 400 mL  Total OUT: 925 mL    Total NET: 125 mL      20 Sep 2018 07:01  -  20 Sep 2018 11:59  --------------------------------------------------------  IN:    Enteral Tube Flush: 125 mL    Free Water: 50 mL    ns in tub fed  [id number]: 375 mL  Total IN: 550 mL    OUT:    Indwelling Catheter - Urethral: 300 mL  Total OUT: 300 mL    Total NET: 250 mL          PHYSICAL EXAM:  General: NAD  Respiratory: b/l air entry  Cardiovascular: S1 S2  Gastrointestinal: soft, s/p PEG  Extremities:  no edema               LABORATORY:                        10.5   11.96 )-----------( 189      ( 20 Sep 2018 06:06 )             31.0     09-20    139  |  102  |  86<H>  ----------------------------<  116<H>  4.4   |  28  |  1.80<H>    Ca    8.2<L>      20 Sep 2018 06:06  Phos  2.6     09-20  Mg     2.4     09-20      Sodium, Serum: 139 mmol/L (09-20 @ 06:06)  Sodium, Serum: 140 mmol/L (09-19 @ 06:28)    Potassium, Serum: 4.4 mmol/L (09-20 @ 06:06)  Potassium, Serum: 4.6 mmol/L (09-19 @ 06:28)    Hemoglobin: 10.5 g/dL (09-20 @ 06:06)  Hemoglobin: 10.6 g/dL (09-19 @ 06:28)  Hemoglobin: 10.4 g/dL (09-18 @ 06:44)    Creatinine, Serum 1.80 (09-20 @ 06:06)  Creatinine, Serum 1.70 (09-19 @ 06:28)  Creatinine, Serum 2.00 (09-18 @ 06:44)

## 2018-09-20 NOTE — PROGRESS NOTE ADULT - PROBLEM SELECTOR PROBLEM 3
Paroxysmal A-fib
PNA (pneumonia)
Paroxysmal A-fib
TIBURCIO (acute kidney injury)
PNA (pneumonia)
TIBURCIO (acute kidney injury)

## 2018-09-20 NOTE — PROGRESS NOTE ADULT - SUBJECTIVE AND OBJECTIVE BOX
[facility] -- Cory Fragoso, Niurka, Aditya, Ermelinda, Davin Linda  Office # [phone]      Follow Up:  sob, dyspnea, difficulty walking    Subjective/Observations: No events overnight resting comfortably in bed   HPI:  85 years old with history of PAF on coumadin (5mg x5 days and 2.5mg x2 days/week), CAD s/p remote PCI (1 stent), Luu's esophagus, a repaired Zenker's diverticulum, hypertension, reflux, hypercholesterolemia, glaucoma, moderate NICM, HCM s/p biV ICD p/w weakness and dyspnea. He has been complaining of more difficulty swallowing x a couple of days. He cannot swallow water at this point. He reports losing 18lbs since May but admits he has no appetite. He notes that he has gotten more dyspneic recently and can only walk a few feet. Despite using a cane, he reports difficulty walking. Recently he had his aldactone reduced by half because of weakness by Dr. Burgess. His PPM was recently interrogated (2 weeks ago). He has mild lower ext edema that is unchanged. Additionally, he reports that he has a small wound on his 2nd digit of right foot that is being managed by outpatient podiatrist. Denies any   palpitations, PND, orthopnea, near syncope, syncope,  stroke like symptoms, pain with swallowing.     In the ED, the patient's vital signs were T: 98, HR 60, /70, R: 16, SpO2 98% on RA. CBC WNL. PT/INR/PTT 43.1/3.85/47.0. CMP significant for BUN/Cr 52/1.80. Albumin 3.0. CE's significant for Troponin 0.245, CKMB 3.4. Serum BNP 98402. CXR: Stable cardiomegaly with atherosclerotic aorta. Left cardiac pacer reidentified in situ. Hyperinflated lungs. Fibrotic atelectatic changes right mid and lower lung field similar to prior. New small right and small-to-moderate left pleural effusion. Suture anchors project over the bilateral shoulders. EKG: . Dr. Linda evaluated patient in ED. UA pending. (10 Sep 2018 19:01)        REVIEW OF SYSTEMS: All other review of systems is negative unless indicated above    PAST MEDICAL & SURGICAL HISTORY:  AF (atrial fibrillation): Cardioversion 5/2012  GERD (gastroesophageal reflux disease)  CHF (congestive heart failure)  CAD (coronary artery disease)  Glaucoma  HTN (Hypertension)  S/P ICD (internal cardiac defibrillator) procedure  H/O excision of Zenker's diverticulum  S/P Cataract Surgery - bilateral  s/p Angioplasty with Stent - 2010  History of Rotator Cuff Surgery Right and Left  S/P Cholecystectomy  S/P TURP (Transurethral Resection of Prostate)  S/P Prostatectomy - 2005      MEDICATIONS  (STANDING):  ALBUTerol    0.083% 2.5 milliGRAM(s) Nebulizer every 6 hours  atorvastatin 40 milliGRAM(s) Oral at bedtime  clopidogrel Tablet 75 milliGRAM(s) Oral daily  digoxin     Tablet 0.125 milliGRAM(s) Oral daily  influenza   Vaccine 0.5 milliLiter(s) IntraMuscular once  metoprolol tartrate 25 milliGRAM(s) Oral two times a day  midodrine 2.5 milliGRAM(s) Oral <User Schedule>  pantoprazole  Injectable 40 milliGRAM(s) IV Push daily  timolol 0.5% Solution 1 Drop(s) Both EYES two times a day  warfarin 5 milliGRAM(s) Oral once    MEDICATIONS  (PRN):  acetaminophen    Suspension .. 650 milliGRAM(s) Enteral Tube every 6 hours PRN Mild Pain (1 - 3)  guaiFENesin    Syrup 200 milliGRAM(s) Oral every 6 hours PRN Cough      Allergies    No Known Allergies    Intolerances            Vital Signs Last 24 Hrs  T(C): 36.4 (20 Sep 2018 07:05), Max: 36.7 (20 Sep 2018 00:24)  T(F): 97.6 (20 Sep 2018 07:05), Max: 98.1 (20 Sep 2018 00:24)  HR: 70 (20 Sep 2018 10:41) (69 - 78)  BP: 97/59 (20 Sep 2018 07:20) (84/54 - 102/65)  BP(mean): --  RR: 18 (20 Sep 2018 07:20) (17 - 19)  SpO2: 94% (20 Sep 2018 10:41) (94% - 99%)    I&O's Summary    19 Sep 2018 07:01  -  20 Sep 2018 07:00  --------------------------------------------------------  IN: 1050 mL / OUT: 925 mL / NET: 125 mL    20 Sep 2018 07:01  -  20 Sep 2018 11:27  --------------------------------------------------------  IN: 550 mL / OUT: 300 mL / NET: 250 mL          PHYSICAL EXAM:  TELE: V pacing @ 70  w/ isolated triplet over night  Constitutional: NAD, awake and alert, well-developed  HEENT: Moist Mucous Membranes, Anicteric  Pulmonary: Non-labored, breath sounds are clear bilaterally, No wheezing, rales or rhonchi  Cardiovascular: Regular, S1 and S2, No murmurs, rubs, gallops or clicks  Gastrointestinal: Bowel Sounds present, soft, nontender.   Lymph: No peripheral edema. No lymphadenopathy.  Skin: No visible rashes or ulcers.  Psych:  Mood & affect appropriate    LABS: All Labs Reviewed:                        10.5   11.96 )-----------( 189      ( 20 Sep 2018 06:06 )             31.0                         10.6   13.35 )-----------( 170      ( 19 Sep 2018 06:28 )             31.2                         10.4   15.13 )-----------( 158      ( 18 Sep 2018 06:44 )             30.6     20 Sep 2018 06:06    139    |  102    |  86     ----------------------------<  116    4.4     |  28     |  1.80   19 Sep 2018 06:28    140    |  105    |  81     ----------------------------<  90     4.6     |  27     |  1.70   18 Sep 2018 06:44    138    |  103    |  85     ----------------------------<  121    4.4     |  26     |  2.00     Ca    8.2        20 Sep 2018 06:06  Ca    8.4        19 Sep 2018 06:28  Ca    8.2        18 Sep 2018 06:44  Phos  2.6       20 Sep 2018 06:06  Mg     2.4       20 Sep 2018 06:06      PT/INR - ( 20 Sep 2018 06:06 )   PT: 23.7 sec;   INR: 2.14 ratio      ECG:  Radiology:  < from: Xray Chest 1 View- PORTABLE-Routine (09.19.18 @ 10:02) >  EXAM:  XR CHEST PORTABLE ROUTINE 1V                            PROCEDURE DATE:  09/19/2018          INTERPRETATION:  Coughing.    AP chest. Prior 9/10/2018.    There is significant worsening in bibasilar airspace disease. Correlate   for pneumonia and/or pulmonary edema. Small right pleural effusion   similar to prior. Moderate left pleural effusion slightly increased.   Otherwise no change.    Impression: As above                NISA ESCOBAR M.D., ATTENDING RADIOLOGIST  This document has been electronically signed. Sep 19 2018 10:03AM          < end of copied text >

## 2018-09-20 NOTE — PROGRESS NOTE ADULT - ASSESSMENT
84 M of PAF AC, CAD s/p remote PCI Luu's esophagus, a repaired Zenker's diverticulum, hypertension, reflux, hypercholesterolemia, glaucoma, moderate NICM, HCM s/p biV ICD p/w dyspnea.  CT scan not consistent with severe volume overload; small pericardial and pleural effusions are noted.  Likely some component of aspiration  Ned are elevated but likely from demand ischemia;  BP cont to be soft ( SBP 's)     # 1 Dyspnea  - No evidence of vascular congestion on CT chest.  However, result is evident of Pna  - Likely from asp  - Continue Abx per Primary/Pulm  - continue bronchodilators  - Recommend incentive spirometer  - Chest PT    #2 CAD  - stable CAD  - Continue Plavix, BB, and statin    #3 PAF  - Continue BB and digoxin for rate control  - 9/20 INR 2.14; Dose Coumadin to keep INR 2-3  - Monitor closely as he is at risk for acute blood loss anemia    #4 TIBURCIO  - Creatinine stablizing and improving  - Avoid nephrotoxics  - Renal following    #5 Anemia  - H/H has stablized  - Workup per primary    Further cardiac workup as case unfolds  Will continue to follow 84 M of PAF AC, CAD s/p remote PCI Luu's esophagus, a repaired Zenker's diverticulum, hypertension, reflux, hypercholesterolemia, glaucoma, moderate NICM, HCM s/p biV ICD p/w dyspnea.  CT scan not consistent with severe volume overload; small pericardial and pleural effusions are noted. Now s/p peg placement Likely some component of aspiration  Ned are elevated but likely from demand ischemia;  BP cont to be soft ( SBP 's)     # 1 Dyspnea  - No evidence of vascular congestion on CT chest.  However, result is evident of Pna  - Likely from asp  - Continue Abx per Primary/Pulm  - continue bronchodilators  - Recommend incentive spirometer  - Chest PT    #2 CAD  - stable CAD  - Continue Plavix, BB, and statin    #3 PAF  - Continue BB and digoxin for rate control  - 9/20 INR 2.14; Dose Coumadin to keep INR 2-3  - Monitor closely as he is at risk for acute blood loss anemia    #4 TIBURCIO  - Creatinine stablizing and improving  - Avoid nephrotoxics  - Renal following    #5 Anemia  - H/H has stablized  - Workup per primary    Further cardiac workup as case unfolds  Will continue to follow

## 2018-09-20 NOTE — PROGRESS NOTE ADULT - PROBLEM SELECTOR PROBLEM 9
Need for prophylactic measure
Glaucoma
Need for prophylactic measure
Glaucoma

## 2018-09-20 NOTE — PROGRESS NOTE ADULT - PROBLEM SELECTOR PROBLEM 1
Pulmonary congestion
Pulmonary congestion
PRADO (dyspnea on exertion)
Pulmonary congestion
Dysphagia
Failure to thrive in adult
Dysphagia
Failure to thrive in adult

## 2018-09-20 NOTE — PROGRESS NOTE ADULT - PROBLEM SELECTOR PLAN 7
-HFpEF (EF 40%). Does not appear overloaded at this time   -lasix on hold now for TIBURCIO and hypotension  -Monitor volume status closely  -strict Is and Os -HFpEF (EF 40%). Does not appear overloaded at this time   -lasix on hold now for TIBURCIO and hypotension  -Monitor volume status closely  -strict Is and Os  -TOV today

## 2018-09-20 NOTE — PROGRESS NOTE ADULT - PROBLEM SELECTOR PLAN 2
TIBURCIO on CKD3, likely 2/2 dehydration now worsening. Likely due to poor po intake and possible element of ATN due to hypotension in setting of hypovolemia.   c/w IVF and  pt on tube feeding and water flushes.  Renal sono c/w no hydronephrosis  and no obstruction  -monitor BMP, Renal f/u.
-TIBURCIO on CKD3, likely 2/2 dehydration now improving   -Likely due to poor po intake and possible element of ATN due to hypotension in setting of hypovolemia  -Renal sono c/w no hydronephrosis  and no obstruction  -monitor BMP  -nephro recs appreciated
-TIBURCIO on CKD3, likely 2/2 dehydration now improving   -Likely due to poor po intake and possible element of ATN due to hypotension in setting of hypovolemia  -s/p IVF as per nephro no need for IVF, c/w tube feeding and water flushes  -Renal sono c/w no hydronephrosis  and no obstruction  -monitor BMP  -nephro (Mindy) recs appreciated
-TIBURCIO on CKD3, likely 2/2 dehydration now improving   -Likely due to poor po intake and possible element of ATN due to hypotension in setting of hypovolemia  -s/p IVF as per nephro no need for IVF, c/w tube feeding and water flushes  -Renal sono c/w no hydronephrosis  and no obstruction  -monitor BMP  -nephro (Mindy) recs appreciated
-TIBURCIO on CKD3, likely 2/2 dehydration now stable  -Likely due to poor po intake and possible element of ATN due to hypotension in setting of hypovolemia (prerenal azotemia)  -monitor BMP  -nephro recs appreciated
Elevated troponin likely related to TIBURCIO  Trops flat.  - Cardio: Dr. Wadsworth following   - monitor on tele
see above, remote hx of repair
Elevated troponin likely related to TIBURCIO  Trops flat.  - Cardio: following   - monitor on tele

## 2018-09-20 NOTE — PROGRESS NOTE ADULT - PROBLEM SELECTOR PROBLEM 7
CHF (congestive heart failure)
HTN (Hypertension)

## 2018-09-20 NOTE — PROGRESS NOTE ADULT - PROBLEM SELECTOR PROBLEM 5
CAD (coronary artery disease)
PRADO (dyspnea on exertion)

## 2018-09-20 NOTE — PROGRESS NOTE ADULT - REASON FOR ADMISSION
sob, dyspnea, difficulty walking, dysphagia

## 2018-09-20 NOTE — PROGRESS NOTE ADULT - SUBJECTIVE AND OBJECTIVE BOX
Date/Time Patient Seen:  		  Referring MD:   Data Reviewed	       Patient is a 85y old  Male who presents with a chief complaint of sob, dyspnea, difficulty walking, dysphagia (19 Sep 2018 17:34)    in bed  seen and examined  vs and meds reviewed      Subjective/HPI     PAST MEDICAL & SURGICAL HISTORY:  AF (atrial fibrillation): Cardioversion 5/2012  GERD (gastroesophageal reflux disease)  CHF (congestive heart failure)  CAD (coronary artery disease)  Glaucoma  HTN (Hypertension)  S/P ICD (internal cardiac defibrillator) procedure  H/O excision of Zenker's diverticulum  S/P Cataract Surgery - bilateral  s/p Angioplasty with Stent - 2010  History of Rotator Cuff Surgery Right and Left  S/P Cholecystectomy  S/P TURP (Transurethral Resection of Prostate)  S/P Prostatectomy - 2005  S/P Prostatectomy  S/P Prostatectomy        Medication list         MEDICATIONS  (STANDING):  ALBUTerol    0.083% 2.5 milliGRAM(s) Nebulizer every 6 hours  atorvastatin 40 milliGRAM(s) Oral at bedtime  clopidogrel Tablet 75 milliGRAM(s) Oral daily  digoxin     Tablet 0.125 milliGRAM(s) Oral daily  influenza   Vaccine 0.5 milliLiter(s) IntraMuscular once  metoprolol tartrate 25 milliGRAM(s) Oral two times a day  midodrine 2.5 milliGRAM(s) Oral <User Schedule>  pantoprazole  Injectable 40 milliGRAM(s) IV Push daily  piperacillin/tazobactam IVPB. 3.375 Gram(s) IV Intermittent every 12 hours  timolol 0.5% Solution 1 Drop(s) Both EYES two times a day    MEDICATIONS  (PRN):  acetaminophen    Suspension .. 650 milliGRAM(s) Enteral Tube every 6 hours PRN Mild Pain (1 - 3)  guaiFENesin    Syrup 200 milliGRAM(s) Oral every 6 hours PRN Cough         Vitals log        ICU Vital Signs Last 24 Hrs  T(C): 36.6 (20 Sep 2018 05:10), Max: 36.7 (20 Sep 2018 00:24)  T(F): 97.8 (20 Sep 2018 05:10), Max: 98.1 (20 Sep 2018 00:24)  HR: 70 (20 Sep 2018 05:10) (69 - 78)  BP: 102/65 (20 Sep 2018 05:10) (84/54 - 102/65)  BP(mean): --  ABP: --  ABP(mean): --  RR: 17 (20 Sep 2018 05:10) (17 - 19)  SpO2: 96% (20 Sep 2018 05:10) (96% - 98%)           Input and Output:  I&O's Detail    18 Sep 2018 07:01  -  19 Sep 2018 07:00  --------------------------------------------------------  IN:    Enteral Tube Flush: 425 mL    Free Water: 100 mL    ns in tub fed  [id number]: 1192 mL    Solution: 200 mL  Total IN: 1917 mL    OUT:    Indwelling Catheter - Urethral: 925 mL  Total OUT: 925 mL    Total NET: 992 mL      19 Sep 2018 07:01  -  20 Sep 2018 06:30  --------------------------------------------------------  IN:    Enteral Tube Flush: 250 mL    ns in tub fed  [id number]: 700 mL    Solution: 100 mL  Total IN: 1050 mL    OUT:    Indwelling Catheter - Urethral: 525 mL    Voided: 400 mL  Total OUT: 925 mL    Total NET: 125 mL          Lab Data                        10.5   11.96 )-----------( 189      ( 20 Sep 2018 06:06 )             31.0     09-20    139  |  102  |  86<H>  ----------------------------<  116<H>  4.4   |  28  |  1.80<H>    Ca    8.2<L>      20 Sep 2018 06:06              Review of Systems	      Objective     Physical Examination    heart s1s2  lung dec BS  abd soft      Pertinent Lab findings & Imaging      Sherly:  NO   Adequate UO     I&O's Detail    18 Sep 2018 07:01  -  19 Sep 2018 07:00  --------------------------------------------------------  IN:    Enteral Tube Flush: 425 mL    Free Water: 100 mL    ns in tub fed  [id number]: 1192 mL    Solution: 200 mL  Total IN: 1917 mL    OUT:    Indwelling Catheter - Urethral: 925 mL  Total OUT: 925 mL    Total NET: 992 mL      19 Sep 2018 07:01  -  20 Sep 2018 06:30  --------------------------------------------------------  IN:    Enteral Tube Flush: 250 mL    ns in tub fed  [id number]: 700 mL    Solution: 100 mL  Total IN: 1050 mL    OUT:    Indwelling Catheter - Urethral: 525 mL    Voided: 400 mL  Total OUT: 925 mL    Total NET: 125 mL               Discussed with:     Cultures:	        Radiology

## 2018-09-20 NOTE — PROGRESS NOTE ADULT - PROBLEM SELECTOR PROBLEM 4
PRADO (dyspnea on exertion)
ACP (advance care planning)
PRADO (dyspnea on exertion)
Paroxysmal A-fib

## 2018-09-20 NOTE — PROGRESS NOTE ADULT - PROVIDER SPECIALTY LIST ADULT
Anesthesia
Cardiology
Gastroenterology
Hospitalist
Infectious Disease
Nephrology
Pulmonology
Surgery
Gastroenterology
Cardiology
Cardiology
Nephrology
Pulmonology
Pulmonology
Hospitalist

## 2018-09-20 NOTE — PROGRESS NOTE ADULT - PROBLEM SELECTOR PROBLEM 6
HTN (Hypertension)
CAD (coronary artery disease)
HTN (Hypertension)
CAD (coronary artery disease)

## 2018-09-20 NOTE — PROGRESS NOTE ADULT - ASSESSMENT
·	TIBURCIO, CKD 3  ·	Anemia  ·	Urinary retention  ·	Hypotension    Stable renal function. Continue tube feeds. On midodrine. Dubois drainage.   Monitor h/h trend. Dubois drainage. ? TOV. D/c Planning.

## 2018-09-20 NOTE — PROGRESS NOTE ADULT - SUBJECTIVE AND OBJECTIVE BOX
Resident Progress Note    Patient is a 85y old  Male who presents with a chief complaint of sob, dyspnea, difficulty walking, dysphagia (20 Sep 2018 06:29)    HPI: Patient seen and examined at bedside. Patient had 3 beats vtach yesterday evening.  No acute events overnight. Patient continues to complain of LUQ pain, however improves with Tylenol.  Also continues to state he feels weak but will try to be more cooperative with PT today.  No other acute complaints.      ROS:  CONSTITUTIONAL: admits to generalized weakness, no fevers or chills  EYES/ENT: No visual changes, no throat pain   RESPIRATORY: + cough, denies wheezing, hemoptysis, shortness of breath  CARDIOVASCULAR: No chest pain or palpitations  GASTROINTESTINAL: admits to abdominal pain over LUQ ribs 8-10, denies nausea, vomiting, or hematemesis; No diarrhea or constipation. No melena or hematochezia.  GENITOURINARY: No dysuria, frequency or hematuria  NEUROLOGICAL: No dizziness, numbness, or weakness  All other review of systems is negative unless indicated above.    Vital Signs Last 24 Hrs  T(C): 36.4 (09-20-18 @ 07:05), Max: 36.7 (09-20-18 @ 00:24)  T(F): 97.6 (09-20-18 @ 07:05), Max: 98.1 (09-20-18 @ 00:24)  HR: 70 (09-20-18 @ 07:20) (69 - 78)  BP: 97/59 (09-20-18 @ 07:20) (84/54 - 102/65)  RR: 18 (09-20-18 @ 07:20) (17 - 19)  SpO2: 99% (09-20-18 @ 07:20) (96% - 99%)    Physical Exam:  HEENT: NC/AT, EOMI, neck supple, MMM, large wound with eschar on scalp  RESPIRATORY: diminished breath sounds at the bases b/l  CARDIOVASCULAR: RRR, no murmurs, gallops, rubs  ABDOMINAL: soft, non-tender, non-distended, positive bowel sounds, PEG+ with dressing, c/d/i  EXTREMITIES: no clubbing, cyanosis, or edema    LABS:                        10.5   11.96 )-----------( 189      ( 20 Sep 2018 06:06 )             31.0     20 Sep 2018 06:06    139    |  102    |  86     ----------------------------<  116    4.4     |  28     |  1.80     Ca    8.2        20 Sep 2018 06:06  Phos  2.6       20 Sep 2018 06:06  Mg     2.4       20 Sep 2018 06:06      PT/INR - ( 20 Sep 2018 06:06 )   PT: 23.7 sec;   INR: 2.14 ratio      MEDICATIONS  (STANDING):  ALBUTerol    0.083% 2.5 milliGRAM(s) Nebulizer every 6 hours  atorvastatin 40 milliGRAM(s) Oral at bedtime  clopidogrel Tablet 75 milliGRAM(s) Oral daily  digoxin     Tablet 0.125 milliGRAM(s) Oral daily  influenza   Vaccine 0.5 milliLiter(s) IntraMuscular once  metoprolol tartrate 25 milliGRAM(s) Oral two times a day  midodrine 2.5 milliGRAM(s) Oral <User Schedule>  pantoprazole  Injectable 40 milliGRAM(s) IV Push daily  piperacillin/tazobactam IVPB. 3.375 Gram(s) IV Intermittent every 12 hours  timolol 0.5% Solution 1 Drop(s) Both EYES two times a day  warfarin 5 milliGRAM(s) Oral once    MEDICATIONS  (PRN):  acetaminophen    Suspension .. 650 milliGRAM(s) Enteral Tube every 6 hours PRN Mild Pain (1 - 3)  guaiFENesin    Syrup 200 milliGRAM(s) Oral every 6 hours PRN Cough    Allergies  No Known Allergies

## 2018-09-20 NOTE — PROGRESS NOTE ADULT - SUBJECTIVE AND OBJECTIVE BOX
CHARISMAKATE is a yMale , patient examined and chart reviewed.    INTERVAL HPI/ OVERNIGHT EVENTS:   NO events. Afebrile.   Sitting in chair. Comfortable.  Wife at bedside.    PAST MEDICAL & SURGICAL HISTORY:  AF (atrial fibrillation): Cardioversion 5/2012  GERD (gastroesophageal reflux disease)  CHF (congestive heart failure)  CAD (coronary artery disease)  Glaucoma  HTN (Hypertension)  S/P ICD (internal cardiac defibrillator) procedure  H/O excision of Zenker's diverticulum  S/P Cataract Surgery - bilateral  s/p Angioplasty with Stent - 2010  History of Rotator Cuff Surgery Right and Left  S/P Cholecystectomy  S/P TURP (Transurethral Resection of Prostate)  S/P Prostatectomy - 2005    For details regarding the patient's social history, family history, and other miscellaneous elements, please refer the initial infectious diseases consultation and/or the admitting history and physical examination for this admission.    ROS:  CONSTITUTIONAL:  Negative fever or chills, feels well, good appetite  EYES:  Negative  blurry vision or double vision  CARDIOVASCULAR:  Negative for chest pain or palpitations  RESPIRATORY:  Negative for cough, wheezing, or SOB   GASTROINTESTINAL:  Negative for nausea, vomiting, diarrhea, constipation, or abdominal pain  GENITOURINARY:  Negative frequency, urgency or dysuria  NEUROLOGIC:  No headache, confusion, dizziness, lightheadedness  All other systems were reviewed and are negative     Current inpatient medications :    ANTIBIOTICS/RELEVANT:      acetaminophen    Suspension .. 650 milliGRAM(s) Enteral Tube every 6 hours PRN  ALBUTerol    0.083% 2.5 milliGRAM(s) Nebulizer every 6 hours  atorvastatin 40 milliGRAM(s) Oral at bedtime  clopidogrel Tablet 75 milliGRAM(s) Oral daily  digoxin     Tablet 0.125 milliGRAM(s) Oral daily  guaiFENesin    Syrup 200 milliGRAM(s) Oral every 6 hours PRN  metoprolol tartrate 25 milliGRAM(s) Oral two times a day  midodrine 2.5 milliGRAM(s) Oral <User Schedule>  pantoprazole   Suspension 40 milliGRAM(s) Oral daily  timolol 0.5% Solution 1 Drop(s) Both EYES two times a day  warfarin 5 milliGRAM(s) Oral once      Objective:    09-19 @ 07:01  -  09-20 @ 07:00  --------------------------------------------------------  IN: 1050 mL / OUT: 925 mL / NET: 125 mL    09-20 @ 07:01  -  09-20 @ 15:02  --------------------------------------------------------  IN: 550 mL / OUT: 300 mL / NET: 250 mL      T(C): 36.3 (09-20-18 @ 11:50), Max: 36.7 (09-20-18 @ 00:24)  HR: 70 (09-20-18 @ 11:50) (69 - 78)  BP: 94/60 (09-20-18 @ 13:27) (83/53 - 102/65)  RR: 17 (09-20-18 @ 11:50) (17 - 19)  SpO2: 98% (09-20-18 @ 11:50) (94% - 99%)  Wt(kg): --      Physical Exam:  General:  no acute distress  Eyes: sclera anicteric, pupils equal and reactive to light  ENMT: buccal mucosa moist, pharynx not injected  Neck: supple, trachea midline  Lungs: Decreased, no wheeze/rhonchi  Cardiovascular: regular rate and rhythm, S1 S2  Abdomen: soft, nontender, +PEG, bowel sounds normal  Neurological: alert and oriented x3, Cranial Nerves II-XII grossly intact  Skin: no increased ecchymosis/petechiae/purpura  Lymph Nodes: no palpable cervical/supraclavicular lymph nodes enlargements  Extremities: no cyanosis/clubbing/edema      LABS:                          10.5   11.96 )-----------( 189      ( 20 Sep 2018 06:06 )             31.0       09-20    139  |  102  |  86<H>  ----------------------------<  116<H>  4.4   |  28  |  1.80<H>    Ca    8.2<L>      20 Sep 2018 06:06  Phos  2.6     09-20  Mg     2.4     09-20    PT/INR - ( 20 Sep 2018 06:06 )   PT: 23.7 sec;   INR: 2.14 ratio      MICROBIOLOGY:                  RADIOLOGY & ADDITIONAL STUDIES:    EXAM:  CT ABDOMEN AND PELVIS                            PROCEDURE DATE:  09/19/2018          INTERPRETATION:  .    CLINICAL INFORMATION: Left upper quadrant pain and tenderness.    TECHNIQUE: Helical axial images were obtained from the domes of the  diaphragm through the pubic symphysis without the administration of IV or   oral contrast. Sagittal and coronal reformatted images were obtained from   the source data.    COMPARISON: No prior CT examinations of the abdomen and pelvis are   available for comparison.     FINDINGS: Evaluation of the heart, vascular structures, and   intra-abdominal organs is limited without the administration of IV   contrast. The heart size is enlarged. A small pericardial effusion is   notable. There are atherosclerotic calcifications of the imaged coronary   arteries, aorta, and branch vessels. The imaged portions of the aorta are   normal in caliber.    Moderate-sized bilateral pleural effusions are notable with adjacent   atelectasis. Tiny calcified granulomas are noted within the left lower   lobe.    The unenhanced appearance to the liver, spleen, pancreas, and adrenal   glands appear unremarkable. The patient is status post cholecystectomy.    An exophytic left-sided renal cyst is noted. There is nonspecific   bilateral perinephric stranding. There is no hydroureteronephrosis. The   patient is status post Dubois catheter placement with the Dubois balloon   inside a decompressed urinary bladder. Intraluminal urinary bladder air   is compatible with Dubois catheter placement.    There is no abdominal or pelvic lymphadenopathy.    The patient is status post percutaneous gastrostomy tube placement with   the gastrostomy balloon inside the lumen of the stomach. There is no   bowel obstruction. High density contrast material is notable within the   large bowel loops from a prior contrast study. Colonic diverticulosis is   noted. The appendix is normal.    The prostate gland and seminal vesicles appear unremarkable.     A left-sided total hip arthroplasty is noted. There is diffuse   osteopenia. Multilevel degenerative changes are noted within the imaged   potions of the spine.    Trace anasarca is notable. There is symmetric bilateral gynecomastia.    IMPRESSION: No bowel obstruction or bowel inflammation.    Small pericardial effusion.    Other chronic nonemergent findings, as discussed in the body of the   report.      EXAM:  XR CHEST PORTABLE ROUTINE 1V                            PROCEDURE DATE:  09/19/2018        INTERPRETATION:  Coughing.    AP chest. Prior 9/10/2018.    There is significant worsening in bibasilar airspace disease. Correlate   for pneumonia and/or pulmonary edema. Small right pleural effusion   similar to prior. Moderate left pleural effusion slightly increased.   Otherwise no change.    Impression: As above    Assessment :   85 years old with history of PAF on coumadin (5mg x5 days and 2.5mg x2 days/week), CAD s/p   PCI Luu's esophagus, Zenker's diverticulum sp repair, HTN HLD CHF with biV ICD scalp SCC  p/w weakness and dysphagia sp PEG  Asp pna stable and improved  Doubt worsening pna likely CHF with atelectasis  TIBURCIO on CKD  +troponins  Leukocytosis multifactorial  Overall stable    Plan :   Finished course of Zosyn   Monitor off antibiotic  Trend wbc  Asp precautions  Diurese per cardiology  Stable from ID standpoint    Continue with present regiment.  Appropriate use of antibiotics and adverse effects reviewed.      I have discussed the above plan of care with patient/ family in detail. They expressed understanding of the  treatment plan . Risks, benefits and alternatives discussed in detail. I have asked if they have any questions or concerns and appropriately addressed them to the best of my ability .    > 35 minutes were spent in direct patient care reviewing notes, medications ,labs data/ imaging , discussion with multidisciplinary team.    Thank you for allowing me to participate in care of your patient .    Katie Mason MD  Infectious Disease  [phone]

## 2018-09-20 NOTE — PROGRESS NOTE ADULT - PROBLEM SELECTOR PLAN 4
Resolved. Likely related to New small right and small-to-moderate left pleural effusions and also mucous plugging.   On empiric antibiotics for suspected aspiration pneumonia. Continue Zosyn and will eventually transition to Augmentin for 7 days of treatment total.   Continue Chest PT, nebs for airway clearance.
-Likely related to new small right and small-to-moderate left pleural effusions and also mucous plugging  -s/p empiric antibiotics for suspected aspiration pneumonia. Completed Zosyn treatment yesterday (total 7 days)  -Leukocytosis trending down  -Continue Chest PT, nebs for airway clearance  -ID (Dr. Mason) consulted
-Resolved. Likely related to New small right and small-to-moderate left pleural effusions and also mucous plugging.   -On empiric antibiotics for suspected aspiration pneumonia. Continue Zosyn and will eventually transition to Augmentin for 7 days of treatment total (currently on Day 5 of treatment)  -Continue Chest PT, nebs for airway clearance.
-Resolved. Likely related to New small right and small-to-moderate left pleural effusions and also mucous plugging.   -On empiric antibiotics for suspected aspiration pneumonia. Continue Zosyn and will eventually transition to Augmentin for 7 days of treatment total (currently on Day 6 of treatment)  -Continue Chest PT, nebs for airway clearance.
-Resolved. Likely related to New small right and small-to-moderate left pleural effusions and also mucous plugging.   -On empiric antibiotics for suspected aspiration pneumonia. Continue Zosyn and will eventually transition to Augmentin for 7 days of treatment total (currently on Day 7 of treatment)  -Continue Chest PT, nebs for airway clearance  - CT abdomen/pelvis ordered as patient was c/o LUQ pain- negative, likely 2/2 cough and MSK related  -CXR- significant worsening in bibasilar airspace disease. Small right pleural effusion similar to prior. Moderate left pleural effusion slightly increased  -procal elevated at .11  -ID (Dr. Mason) consulted
Advanced care planning was discussed with patient and family.  Advanced care planning forms were reviewed and discussed.  Risks, benefits and alternatives of gastroenterologic procedures were discussed in detail and all questions were answered.    30 minutes spent.
PAF on home coumadin (5mg x5 days/week, 2.5mg x2 days/week)  - INR is supratherapeutic again today. Will hold Coumadin and monitor INR and dose when appropriate.  - Continue digoxin 0.125mcg.   - monitor on Tele  - cardio following
PAF on home coumadin (5mg x5 days/week, 2.5mg x2 days/week)  - INR is supratherapeutic again today. Will hold Coumadin and monitor INR and dose when appropriate.  - Continue digoxin 0.125mcg.   - monitor on Tele  - cardio following
PAF on home coumadin (5mg x5 days/week, 2.5mg x2 days/week)  -INR reversed for today's procedure.  -OK to restart Coumadin tomorrow per GI.  - Continue digoxin 0.125mcg.   - monitor on Tele  - cardio following
PAF on home coumadin (5mg x5 days/week, 2.5mg x2 days/week)  -INR reversed for today's procedure.  -Restart Coumadin when cleared by GI post PEG.  - Continue digoxin 0.125mcg.   - monitor on Tele  - cardio following
PAF on home coumadin (5mg x5 days/week, 2.5mg x2 days/week)  OK to restart Coumadin as per GI.  - Continue digoxin 0.125mcg.   - monitor on Tele  - cardio following

## 2018-09-20 NOTE — PROGRESS NOTE ADULT - PROBLEM SELECTOR PROBLEM 2
TIBURCIO (acute kidney injury)
Elevated troponin
TIBURCIO (acute kidney injury)
Zenker diverticulum
Elevated troponin

## 2018-09-20 NOTE — PROGRESS NOTE ADULT - SUBJECTIVE AND OBJECTIVE BOX
Interval Events: Pt seen and examined. He denies abdominal pain, N/V/D/C, brbpr/melena. Pt is having bms. Tolerating PEG feeds well.    MEDICATIONS  (STANDING):  ALBUTerol    0.083% 2.5 milliGRAM(s) Nebulizer every 6 hours  atorvastatin 40 milliGRAM(s) Oral at bedtime  clopidogrel Tablet 75 milliGRAM(s) Oral daily  digoxin     Tablet 0.125 milliGRAM(s) Oral daily  influenza   Vaccine 0.5 milliLiter(s) IntraMuscular once  metoprolol tartrate 25 milliGRAM(s) Oral two times a day  midodrine 2.5 milliGRAM(s) Oral <User Schedule>  pantoprazole  Injectable 40 milliGRAM(s) IV Push daily  timolol 0.5% Solution 1 Drop(s) Both EYES two times a day  warfarin 5 milliGRAM(s) Oral once    MEDICATIONS  (PRN):  acetaminophen    Suspension .. 650 milliGRAM(s) Enteral Tube every 6 hours PRN Mild Pain (1 - 3)  guaiFENesin    Syrup 200 milliGRAM(s) Oral every 6 hours PRN Cough      Allergies    No Known Allergies    Intolerances        Review of Systems:    General:  No wt loss, fevers, chills, night sweats,fatigue,   Eyes:  Good vision, no reported pain  ENT:  No sore throat, pain, runny nose, dysphagia  CV:  No pain, palpitations, hypo/hypertension  Resp:  No dyspnea, cough, tachypnea, wheezing  GI:  No pain, No nausea, No vomiting, No diarrhea, No constipation, No weight loss, No fever, No pruritis, No rectal bleeding, No melena, No dysphagia  :  No pain, bleeding, incontinence, nocturia  Muscle:  No pain, weakness  Neuro:  No weakness, tingling, memory problems  Psych:  No fatigue, insomnia, mood problems, depression  Endocrine:  No polyuria, polydypsia, cold/heat intolerance  Heme:  No petechiae, ecchymosis, easy bruisability  Skin:  No rash, tattoos, scars, edema      Vital Signs Last 24 Hrs  T(C): 36.4 (20 Sep 2018 07:05), Max: 36.7 (20 Sep 2018 00:24)  T(F): 97.6 (20 Sep 2018 07:05), Max: 98.1 (20 Sep 2018 00:24)  HR: 70 (20 Sep 2018 10:41) (69 - 78)  BP: 97/59 (20 Sep 2018 07:20) (90/50 - 102/65)  BP(mean): --  RR: 18 (20 Sep 2018 07:20) (17 - 19)  SpO2: 94% (20 Sep 2018 10:41) (94% - 99%)    PHYSICAL EXAM:    Constitutional: NAD, well-developed  HEENT: EOMI, throat clear  Neck: No LAD, supple  Respiratory: CTA and P  Cardiovascular: S1 and S2, RRR, no M  Gastrointestinal: BS+, soft, NT/ND, neg HSM, PEG c/d/i  Extremities: No peripheral edema, neg clubbing, cyanosis  Vascular: 2+ peripheral pulses  Neurological: A/O x 3, no focal deficits  Psychiatric: Normal mood, normal affect  Skin: No rashes          LABS:                        10.5   11.96 )-----------( 189      ( 20 Sep 2018 06:06 )             31.0     09-20    139  |  102  |  86<H>  ----------------------------<  116<H>  4.4   |  28  |  1.80<H>    Ca    8.2<L>      20 Sep 2018 06:06  Phos  2.6     09-20  Mg     2.4     09-20      PT/INR - ( 20 Sep 2018 06:06 )   PT: 23.7 sec;   INR: 2.14 ratio               RADIOLOGY & ADDITIONAL TESTS:

## 2018-09-20 NOTE — PROGRESS NOTE ADULT - NSHPATTENDINGPLANDISCUSS_GEN_ALL_CORE
RN, patient
RN, patient, renal and wife.
GI, renal, patient  (re: tube feeds, PEG, antibiotics, TIBURCIO, deconditioning)
Pt, RN and resident.
Pt, RN and resident.
cardio, GI, patient  (re: need for continued NPO status, NGT placement for nutrition, meds, and EGD/PEG placement)
cardio, pulm, GI, patient  (re: need for continued NPO status, NGT placement for nutrition, meds, and EGD/PEG placement)
cardio, pulm, speech and language path, patient and wife (re: need for NPO status, possible NGT placement for nutrition, meds, and EGD once more stable)
Pt, RN and resident.
Pt, RN, wife and resident.

## 2018-10-24 PROCEDURE — 99221 1ST HOSP IP/OBS SF/LOW 40: CPT

## 2018-10-24 PROCEDURE — 82272 OCCULT BLD FECES 1-3 TESTS: CPT

## 2018-10-24 PROCEDURE — 82962 GLUCOSE BLOOD TEST: CPT

## 2018-10-24 PROCEDURE — 80053 COMPREHEN METABOLIC PANEL: CPT

## 2018-10-24 PROCEDURE — 81001 URINALYSIS AUTO W/SCOPE: CPT

## 2018-10-24 PROCEDURE — 97112 NEUROMUSCULAR REEDUCATION: CPT

## 2018-10-24 PROCEDURE — 74176 CT ABD & PELVIS W/O CONTRAST: CPT

## 2018-10-24 PROCEDURE — 71045 X-RAY EXAM CHEST 1 VIEW: CPT

## 2018-10-24 PROCEDURE — 94640 AIRWAY INHALATION TREATMENT: CPT

## 2018-10-24 PROCEDURE — L8699: CPT

## 2018-10-24 PROCEDURE — 99285 EMERGENCY DEPT VISIT HI MDM: CPT | Mod: 25

## 2018-10-24 PROCEDURE — 76775 US EXAM ABDO BACK WALL LIM: CPT

## 2018-10-24 PROCEDURE — 74230 X-RAY XM SWLNG FUNCJ C+: CPT

## 2018-10-24 PROCEDURE — 36415 COLL VENOUS BLD VENIPUNCTURE: CPT

## 2018-10-24 PROCEDURE — 84436 ASSAY OF TOTAL THYROXINE: CPT

## 2018-10-24 PROCEDURE — 94760 N-INVAS EAR/PLS OXIMETRY 1: CPT

## 2018-10-24 PROCEDURE — 80048 BASIC METABOLIC PNL TOTAL CA: CPT

## 2018-10-24 PROCEDURE — 97162 PT EVAL MOD COMPLEX 30 MIN: CPT

## 2018-10-24 PROCEDURE — 97530 THERAPEUTIC ACTIVITIES: CPT

## 2018-10-24 PROCEDURE — 94664 DEMO&/EVAL PT USE INHALER: CPT

## 2018-10-24 PROCEDURE — 82550 ASSAY OF CK (CPK): CPT

## 2018-10-24 PROCEDURE — 85610 PROTHROMBIN TIME: CPT

## 2018-10-24 PROCEDURE — 71250 CT THORAX DX C-: CPT

## 2018-10-24 PROCEDURE — 84145 PROCALCITONIN (PCT): CPT

## 2018-10-24 PROCEDURE — 85027 COMPLETE CBC AUTOMATED: CPT

## 2018-10-24 PROCEDURE — 97110 THERAPEUTIC EXERCISES: CPT

## 2018-10-24 PROCEDURE — 83735 ASSAY OF MAGNESIUM: CPT

## 2018-10-24 PROCEDURE — 99231 SBSQ HOSP IP/OBS SF/LOW 25: CPT

## 2018-10-24 PROCEDURE — 93005 ELECTROCARDIOGRAM TRACING: CPT

## 2018-10-24 PROCEDURE — 80162 ASSAY OF DIGOXIN TOTAL: CPT

## 2018-10-24 PROCEDURE — 82553 CREATINE MB FRACTION: CPT

## 2018-10-24 PROCEDURE — 85730 THROMBOPLASTIN TIME PARTIAL: CPT

## 2018-10-24 PROCEDURE — 90686 IIV4 VACC NO PRSV 0.5 ML IM: CPT

## 2018-10-24 PROCEDURE — 84480 ASSAY TRIIODOTHYRONINE (T3): CPT

## 2018-10-24 PROCEDURE — 94667 MNPJ CHEST WALL 1ST: CPT

## 2018-10-24 PROCEDURE — 97116 GAIT TRAINING THERAPY: CPT

## 2018-10-24 PROCEDURE — 84484 ASSAY OF TROPONIN QUANT: CPT

## 2018-10-24 PROCEDURE — 84443 ASSAY THYROID STIM HORMONE: CPT

## 2018-10-24 PROCEDURE — 84100 ASSAY OF PHOSPHORUS: CPT

## 2018-10-24 PROCEDURE — 83880 ASSAY OF NATRIURETIC PEPTIDE: CPT

## 2020-05-24 NOTE — CONSULT NOTE ADULT - CONSULT REQUESTED DATE/TIME
10-Sep-2018 18:13
11-Sep-2018 06:50
11-Sep-2018 12:06
12-Sep-2018
13-Sep-2018 16:08
19-Sep-2018 17:34
EKG/Labs

## 2021-01-15 NOTE — SWALLOW VFSS/MBS ASSESSMENT ADULT - COMMENTS
No
85 years old admitted with dysphagia, odynophagia c history of PAF on coumadin, CAD s/p remote PCI , Luu's esophagus, a repaired Zenker's diverticulum over 10 years ago, hypertension, reflux, hypercholesterolemia, glaucoma, weakness and dyspnea.  Pt seated at 90 angle for presentations of regular, soft, chopped, puree and thin/ thickened liquids Pt  c adequate labial strippage of bolus off spoon, slow mastication, c delayed hyolaryngeal elevation resulting in premature spillage of bolus to the level of the valleculae c moderate to severe pooling/stasis c moderate penetration c aspiration c all textures. questionable zenker's diverticulum observed/ shoulder obstruction   Pt at high choking and aspiration risk c the most conservative po textures

## 2023-08-29 NOTE — PROGRESS NOTE ADULT - PROBLEM SELECTOR PLAN 5
Anesthesia Evaluation     no history of anesthetic complications:   NPO Solid Status: > 8 hours  NPO Liquid Status: > 2 hours           Airway   Mallampati: I  TM distance: >3 FB  Neck ROM: full  No difficulty expected  Dental      Pulmonary    (-) sleep apnea, not a smoker  Cardiovascular   Exercise tolerance: excellent (>7 METS)    (+) hypertension, valvular problems/murmurs (ascending aneurysm) AS, CAD, PVD, hyperlipidemia    ROS comment: Echo 9/2022  ú Left ventricular ejection fraction appears to be 61 - 65%. Left ventricular systolic function is normal.  ú Left ventricular wall thickness is consistent with mild concentric hypertrophy.  ú Left ventricular diastolic function was normal.  ú Moderate to severe aortic valve stenosis is present.  ú Normal global longitudinal LV strain (GLS) = -17.0%.  ú Estimated right ventricular systolic pressure from tricuspid regurgitation is normal (<35 mmHg).  ú Similar to prior echo dated 04/5/22    CT angio  The atheromatous changes of the thoracic aorta is reidentified.  Aneurysmal dilatation of the ascending thoracic aorta persists. It  measures 4.5 x 4.5 cm above the aortic root. It previously measured 4.9  x 4.5 cm at the same level. No dissection. The aortic lumen in the  proximal/anterior aortic arch measures 3.4 cm. It measures 3.1 cm at the  distal/posterior aortic arch. The proximal descending thoracic aorta  measures 3.2 cm in diameter.        Neuro/Psych  (-) seizures, TIA, CVA  GI/Hepatic/Renal/Endo    (+) renal disease stones  (-) diabetes    Musculoskeletal     Abdominal    Substance History      OB/GYN          Other                        Anesthesia Plan    ASA 4     MAC     (Pt with ascending aortic aneurysm and moderate to severe AS who is being followed closely by Dr Kumar. He reports excellent functional status. Risk of hemodynamic changes with anesthesia discussed with patient. Will proceed with careful attention, 5 lead ekg)  intravenous induction 
    Anesthetic plan, risks, benefits, and alternatives have been provided, discussed and informed consent has been obtained with: patient.    CODE STATUS:         
CAD, chronic  Plavix and Statin resumed via PEG
-CAD, chronic  -Plavix, BB and Statin resumed via PEG
PRADO, likely related to New small right and small-to-moderate left pleural effusions and also mucous plugging.   Continue Chest PT, nebs for airway clearance.  Patient additionally has HFpEF (EF 40%),    Does not appear fluid overloaded at this time. Monitor for signs of overload.  Hold Lasix.
PRADO, likely related to New small right and small-to-moderate left pleural effusions and also mucous plugging.   Patient additionally has HFpEF (EF 40%),    -Discussed with cardio and will give IV lasix 40 mg daily given his pleural effusions.  -Will order chest PT for mucous plugging.
Resolved. Likely related to New small right and small-to-moderate left pleural effusions and also mucous plugging.   Continue Chest PT, nebs for airway clearance.  Patient additionally has HFpEF (EF 40%),    Does not appear fluid overloaded at this time. Giving fluids today. Will monitor for signs of overload.  Hold Lasix.
Resolved. Likely related to New small right and small-to-moderate left pleural effusions and also mucous plugging.   On empiric antibiotics for suspected aspiration pneumonia. Continue Zosyn and will eventually transition to Augmentin for 7 days of treatment total.   Continue Chest PT, nebs for airway clearance.  Patient additionally has HFpEF (EF 40%),    Does not appear fluid overloaded at this time. Giving fluids today. Will monitor for signs of overload.  Hold Lasix.
Resolved. Likely related to New small right and small-to-moderate left pleural effusions and also mucous plugging.   On empiric antibiotics for suspected aspiration pneumonia. Continue Zosyn and will eventually transition to Augmentin for 7 days of treatment total.   Continue Chest PT, nebs for airway clearance.  Patient additionally has HFpEF (EF 40%),    Does not appear fluid overloaded at this time. Giving fluids today. Will monitor for signs of overload.  Hold Lasix.